# Patient Record
Sex: MALE | Race: WHITE | NOT HISPANIC OR LATINO | Employment: OTHER | URBAN - METROPOLITAN AREA
[De-identification: names, ages, dates, MRNs, and addresses within clinical notes are randomized per-mention and may not be internally consistent; named-entity substitution may affect disease eponyms.]

---

## 2017-01-02 ENCOUNTER — ANTICOAGULATION THERAPY VISIT (OUTPATIENT)
Dept: NURSING | Facility: CLINIC | Age: 77
End: 2017-01-02
Payer: MEDICARE

## 2017-01-02 DIAGNOSIS — I48.20 CHRONIC ATRIAL FIBRILLATION (H): ICD-10-CM

## 2017-01-02 DIAGNOSIS — Z79.01 LONG-TERM (CURRENT) USE OF ANTICOAGULANTS: Primary | ICD-10-CM

## 2017-01-02 LAB — INR POINT OF CARE: 3.2 (ref 0.86–1.14)

## 2017-01-02 PROCEDURE — 99207 ZZC NO CHARGE NURSE ONLY: CPT

## 2017-01-02 PROCEDURE — 85610 PROTHROMBIN TIME: CPT | Mod: QW

## 2017-01-02 PROCEDURE — 36416 COLLJ CAPILLARY BLOOD SPEC: CPT

## 2017-01-02 NOTE — PROGRESS NOTES
ANTICOAGULATION FOLLOW-UP CLINIC VISIT    Patient Name:  William Lechuga  Date:  1/2/2017  Contact Type:  Face to Face    SUBJECTIVE:     Patient Findings     Positives No Problem Findings           OBJECTIVE    INR PROTIME   Date Value Ref Range Status   01/02/2017 3.2* 0.86 - 1.14 Final       ASSESSMENT / PLAN  INR assessment THER    Recheck INR In: 12 WEEKS advised to have INR checked sooner, leaving for [place]   INR Location Clinic      Anticoagulation Summary as of 1/2/2017     INR goal 2.0-3.0   Selected INR 3.2! (1/2/2017)   Maintenance plan 7.5 mg (5 mg x 1.5) on Mon; 5 mg (5 mg x 1) all other days   Full instructions 7.5 mg on Mon; 5 mg all other days   Weekly total 37.5 mg   No change documented Fartun Angulo RN   Plan last modified Ariana Arriaga (3/29/2016)   Next INR check 3/23/2017   Target end date     Indications   Long-term (current) use of anticoagulants [Z79.01] [Z79.01]  Chronic atrial fibrillation (H) [I48.2]         Anticoagulation Episode Summary     INR check location     Preferred lab     Send INR reminders to BE PROVIDER    Comments       Anticoagulation Care Providers     Provider Role Specialty Phone number    Narcisa Mcmahon MD [facility] [phone]            See the Encounter Report to view Anticoagulation Flowsheet and Dosing Calendar (Go to Encounters tab in chart review, and find the Anticoagulation Therapy Visit)        Fartun Angulo RN

## 2017-01-02 NOTE — MR AVS SNAPSHOT
William ALEXANDER Alexandrea   1/2/2017 8:30 AM   Anticoagulation Therapy Visit    Description:  76 year old male   Provider:  ASHLEY ANTI COAG   Department:  Be Nurse           INR as of 1/2/2017     Selected INR 3.2! (1/2/2017)      Anticoagulation Summary as of 1/2/2017     INR goal 2.0-3.0   Selected INR 3.2! (1/2/2017)   Full instructions 7.5 mg on Mon; 5 mg all other days   Next INR check 3/23/2017    Indications   Long-term (current) use of anticoagulants [Z79.01] [Z79.01]  Chronic atrial fibrillation (H) [I48.2]         Description     Patient leaving for [place] and will be returning in March.      Your next Anticoagulation Clinic appointment(s)     Mar 23, 2017  8:30 AM   Anticoagulation Visit with BE ANTI COAG   [facility] [place] ([facility])    [address]  [place] [address] [phone]              Contact Numbers     [facility]  Please call [phone] with any problems or questions regarding your therapy, or to cancel or reschedule your appointment.          January 2017 Details    Sun Mon Tue Wed Thu Fri Sat     1               2      7.5 mg   See details      3      5 mg         4      5 mg         5      5 mg         6      5 mg         7      5 mg           8      5 mg         9      7.5 mg         10      5 mg         11      5 mg         12      5 mg         13      5 mg         14      5 mg           15      5 mg         16      7.5 mg         17      5 mg         18      5 mg         19      5 mg         20      5 mg         21      5 mg           22      5 mg         23      7.5 mg         24      5 mg         25      5 mg         26      5 mg         27      5 mg         28      5 mg           29      5 mg         30      7.5 mg         31      5 mg              Date Details   01/02 This INR check               How to take your warfarin dose     To take:  5 mg Take 1 of the 5 mg tablets.    To take:  7.5 mg Take 1.5 of the 5 mg tablets.           February 2017  Details    Sun Mon Tue Wed Thu Fri Sat        1      5 mg         2      5 mg         3      5 mg         4      5 mg           5      5 mg         6      7.5 mg         7      5 mg         8      5 mg         9      5 mg         10      5 mg         11      5 mg           12      5 mg         13      7.5 mg         14      5 mg         15      5 mg         16      5 mg         17      5 mg         18      5 mg           19      5 mg         20      7.5 mg         21      5 mg         22      5 mg         23      5 mg         24      5 mg         25      5 mg           26      5 mg         27      7.5 mg         28      5 mg              Date Details   No additional details            How to take your warfarin dose     To take:  5 mg Take 1 of the 5 mg tablets.    To take:  7.5 mg Take 1.5 of the 5 mg tablets.           March 2017 Details    Sun Mon Tue Wed u Fri Sat        1      5 mg         2      5 mg         3      5 mg         4      5 mg           5      5 mg         6      7.5 mg         7      5 mg         8      5 mg         9      5 mg         10      5 mg         11      5 mg           12      5 mg         13      7.5 mg         14      5 mg         15      5 mg         16      5 mg         17      5 mg         18      5 mg           19      5 mg         20      7.5 mg         21      5 mg         22      5 mg         23            24               25                 26               27               28               29               30               31                 Date Details   No additional details    Date of next INR:  3/23/2017         How to take your warfarin dose     To take:  5 mg Take 1 of the 5 mg tablets.    To take:  7.5 mg Take 1.5 of the 5 mg tablets.

## 2017-01-06 ENCOUNTER — OFFICE VISIT (OUTPATIENT)
Dept: SLEEP MEDICINE | Facility: CLINIC | Age: 77
End: 2017-01-06
Payer: MEDICARE

## 2017-01-06 VITALS
HEIGHT: 72 IN | DIASTOLIC BLOOD PRESSURE: 82 MMHG | OXYGEN SATURATION: 98 % | SYSTOLIC BLOOD PRESSURE: 133 MMHG | HEART RATE: 53 BPM | BODY MASS INDEX: 30.88 KG/M2 | WEIGHT: 228 LBS

## 2017-01-06 DIAGNOSIS — G47.33 OSA (OBSTRUCTIVE SLEEP APNEA): Primary | ICD-10-CM

## 2017-01-06 PROCEDURE — 99213 OFFICE O/P EST LOW 20 MIN: CPT | Performed by: PHYSICIAN ASSISTANT

## 2017-01-06 NOTE — PATIENT INSTRUCTIONS

## 2017-01-06 NOTE — NURSING NOTE
Chief Complaint   Patient presents with     RECHECK     CPAP f/u       Initial /83 mmHg  Pulse 44  Ht 1.829 m (6')  Wt 103.42 kg (228 lb)  BMI 30.92 kg/m2  SpO2 98% Estimated body mass index is 30.92 kg/(m^2) as calculated from the following:    Height as of this encounter: 1.829 m (6').    Weight as of this encounter: 103.42 kg (228 lb).  BP completed using cuff size: regular

## 2017-01-06 NOTE — PROGRESS NOTES
Obstructive Sleep Apnea- PAP Follow-Up Visit:    Chief Complaint   Patient presents with     RECHECK     CPAP f/u       William Lechuga comes in today for follow-up of their moderate sleep apnea, managed with CPAP.     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 He was referred for sleep evaluation in the setting of chronic Afib and suspected sleep disordered breathing.    William had a sleep study done on 10/29/2015 at the [facility] [place] for loud snoring, gasping, witnessed apnea, some fatigue, morning dry mouth, crowded oropharynx and co-morbid HTN and A-fib.  Due to presence of respiratory events, this study was done in two parts (baseline followed by CPAP titration).  Diagnostic PSG:Sleep latency 14.9 minutes without Ambien. REM achieved. REM latency 41.5 minutes. Sleep efficiency 77.9%. Total sleep time 226.5 minutes.  Sleep architecture: Stage 1, 10.4% (5%), stage 2, 57.6% (45-55%), stage 3, 15.0% (15-20%), stage REM, 17.0% (20-25%). AHI was 15.9, lowest oxygen saturation was 80.3% (time spent below 89% was 25.4 minutes). RDI 18.0. Consistent with moderate REM NIELS. Periodic Limb Movement Index 22.8/hour. PLM arousal index 1.3 per hour   CPAP titration: Sleep latency 42.0 minutes. REM latency 24.5 minutes. Sleep efficiency 51.1%. Total sleep time 68.5 minutes. Sleep architecture: Stage 1, 13.9% (5%), stage 2, 65.7% (45-55%), stage 3, 8.8% (15-20%), stage REM, 11.7% (20-25%). The optimal pressure was 5 cmH2O with an AHI of 0 events per hour. Time in REM supine on final pressure was 0 minutes. This titration was considered adequate (residual RDI with 75% decrease, or above constraints without REM-supine sleep at final pressure.  Periodic Limb Movement Index 0.0 /hour.     Last clinic visit was on 1/11/2016 with me. He reports CPAP continues to work well for him. He wants to obtain  supplies, otherwise no other concerns. He reports using the CPAP regularly during sleep and denies air hunger or interface problems. He denies any hospitalizations due to cardiovasular disease and stroke since he was last seen at the sleep clinic.    Overall, the patient rates their experience with PAP as 8 (0 poor, 10 great). The mask is comfortable. The mask is not leaking, 7 nights per week. They are not snoring with the mask on. They are not having gasp arousals.  They are not having significant oral/nasal dryness. The pressure settings are comfortable.     Patient uses full-face mask.    Bedtime is typically 9 pm. Usually it takes about 10 minutes to fall asleep with the mask on. Wake time is typically 3:30 am.  Patient is using PAP therapy 7 hours per night. The patient is usually getting 6.5 hours of sleep per night.    Patient does feel rested in the morning.    Jefferson Sleepiness Scale: 4/24    Respironics  Auto-PAP 9-14 cmH2O download:  30 total days of use. 0 nonuse days.  Average use 6 hours 59 minutes per day.  100% days with >4 hours use.  Large leak 15 min 28 sec per day average. CPAP 90% pressure 12.2cm. AHI 4.3        Past medical/surgical history, family history, social history, medications and allergies were reviewed.      Problem List:  Patient Active Problem List    Diagnosis Date Noted     Long-term (current) use of anticoagulants [Z79.01] 03/29/2016     Priority: Medium     NIELS (obstructive sleep apnea)- moderate (AHI 15) 11/02/2015     Priority: Medium     Study Date: 10/29/2015- (224.0 lbs) apnea/hypopnea index was 15.9 events per hour.  The REM AHI was 20.3 events per hour.  The supine AHI was 5.7 events per hour. Lowest oxygen saturation was 80.3%.  Time spent below 89% was 25.4 minutes. CPAP optimal pressure was 5 cmH2O with an AHI of 0 events per hour.  Time in REM supine on final pressure was 0 minutes. During the diagnostic portion of the study, PLM index was 22.8 movements per hour.  During the treatment portion of the study, there were 0 PLMs recorded.        Malignant neoplasm of thyroid gland (H) 10/31/2015     Priority: Medium     Hurthle cell cancer, s/p thyroidectomy 1/2005        Obesity 10/22/2015     Priority: Medium     Hyperlipidemia LDL goal <130 10/31/2010     Priority: Medium     Chronic atrial fibrillation (H)      Priority: Medium     Hypertension      Priority: Medium     Impaired fasting glucose      Priority: Medium     Hypothyroid      Priority: Medium        /82 mmHg  Pulse 53  Ht 1.829 m (6')  Wt 103.42 kg (228 lb)  BMI 30.92 kg/m2  SpO2 98%        Impression/Plan:  1. Moderate obstructive sleep apnea-  The patient is showing a good degree of compliance, appears well controlled on auto CPAP 9-14 cm/H20 and is receiving a good improvement in sleep quality at this point in time.   Continue current treatment  DME order renewed.   Patient is currently using [employer] as an equipment supplier and verbalized knowledge of how to reach them as needed. Techniques given to patient to assist with proper fitting of mask and keeping seal throughout the night. nformation also given on maintenance/cleaning or equipment and when to replace equipment/supplies. Patient reminded of risks associated with untreated NIELS. Reminded not drive or engage in potentially dangerous activities if feeling sleepy. Reminded to bring PAP equipment if hospitalized and if anticipating surgery to discuss sleep apnea and use of PAP with surgeon.        William Lechuga will follow up in about 1 year(s).     Fifteen minutes spent with patient, all of which were spent face-to-face counseling, consulting, coordinating plan of care.      Jaylene Viera PA-C    CC:  Narcisa Mcmahon

## 2017-01-06 NOTE — MR AVS SNAPSHOT
After Visit Summary   1/6/2017    William Lechuga    MRN: [id number]           Patient Information     Date Of Birth          1940        Visit Information        Provider Department      1/6/2017 7:30 AM Jaylene Viera PA [facility]        Today's Diagnoses     NIELS (obstructive sleep apnea)    -  1       Care Instructions      Your BMI is Body mass index is 30.92 kg/(m^2).  Weight management is a personal decision.  If you are interested in exploring weight loss strategies, the following discussion covers the approaches that may be successful. Body mass index (BMI) is one way to tell whether you are at a healthy weight, overweight, or obese. It measures your weight in relation to your height.  A BMI of 18.5 to 24.9 is in the healthy range. A person with a BMI of 25 to 29.9 is considered overweight, and someone with a BMI of 30 or greater is considered obese. More than two-thirds of American adults are considered overweight or obese.  Being overweight or obese increases the risk for further weight gain. Excess weight may lead to heart disease and diabetes.  Creating and following plans for healthy eating and physical activity may help you improve your health.  Weight control is part of healthy lifestyle and includes exercise, emotional health, and healthy eating habits. Careful eating habits lifelong are the mainstay of weight control. Though there are significant health benefits from weight loss, long-term weight loss with diet alone may be very difficult to achieve- studies show long-term success with dietary management in less than 10% of people. Attaining a healthy weight may be especially difficult to achieve in those with severe obesity. In some cases, medications, devices and surgical management might be considered.  What can you do?  If you are overweight or obese and are interested in methods for weight loss, you should discuss this with your provider.     Consider reducing  daily calorie intake by 500 calories.     Keep a food journal.     Avoiding skipping meals, consider cutting portions instead.    Diet combined with exercise helps maintain muscle while optimizing fat loss. Strength training is particularly important for building and maintaining muscle mass. Exercise helps reduce stress, increase energy, and improves fitness. Increasing exercise without diet control, however, may not burn enough calories to loose weight.       Start walking three days a week 10-20 minutes at a time    Work towards walking thirty minutes five days a week     Eventually, increase the speed of your walking for 1-2 minutes at time    In addition, we recommend that you review healthy lifestyles and methods for weight loss available through the National Institutes of Health patient information sites:  http://win.niddk.nih.gov/publications/index.htm    And look into health and wellness programs that may be available through your health insurance provider, employer, local community center, or [employer] club.    Weight management plan: Patient was referred to their PCP to discuss a diet and exercise plan.            Follow-ups after your visit        Follow-up notes from your care team     Return in about 1 year (around 1/6/2018).      Your next 10 appointments already scheduled     Mar 23, 2017  8:30 AM   Anticoagulation Visit with BE ANTI COAG   [facility] [place] ([facility])    [address] [address] [phone]              Who to contact     If you have questions or need follow up information about today's clinic visit or your schedule please contact [facility] directly at [phone].  Normal or non-critical lab and imaging results will be communicated to you by MyChart, letter or phone within 4 business days after the clinic has received the results. If you do not hear from us within 7 days, please contact the clinic through [facility] or  "phone. If you have a critical or abnormal lab result, we will notify you by phone as soon as possible.  Submit refill requests through [employer] or call your pharmacy and they will forward the refill request to us. Please allow 3 business days for your refill to be completed.          Additional Information About Your Visit        [facility] Information     [employer] lets you send messages to your doctor, view your test results, renew your prescriptions, schedule appointments and more. To sign up, go to [email or web address] . Click on \"Log in\" on the left side of the screen, which will take you to the Welcome page. Then click on \"Sign up Now\" on the right side of the page.     You will be asked to enter the access code listed below, as well as some personal information. Please follow the directions to create your username and password.     Your access code is: [email or web address]  Expires: 2017  9:59 AM     Your access code will  in 90 days. If you need help or a new code, please call your [facility] clinic or [phone].        Care EveryWhere ID     This is your Care EveryWhere ID. This could be used by other organizations to access your [facility] medical records  [id number]        Your Vitals Were     Pulse Height BMI (Body Mass Index) Pulse Oximetry          53 1.829 m (6') 30.92 kg/m2 98%         Blood Pressure from Last 3 Encounters:   17 133/82   16 130/84   16 124/76    Weight from Last 3 Encounters:   17 103.42 kg (228 lb)   16 101.606 kg (224 lb)   16 105.235 kg (232 lb)              Today, you had the following     No orders found for display       Primary Care Provider Office Phone # Fax #    Narcisa Mcmahon [phone]       [address] [address]        Thank you!     Thank you for choosing [facility]  for your care. Our goal is always to provide you with excellent care. Hearing " back from our patients is one way we can continue to improve our services. Please take a few minutes to complete the written survey that you may receive in the mail after your visit with us. Thank you!             Your Updated Medication List - Protect others around you: Learn how to safely use, store and throw away your medicines at www.disposemymeds.org.          This list is accurate as of: 1/6/17  8:01 AM.  Always use your most recent med list.                   Brand Name Dispense Instructions for use    hydrochlorothiazide 25 MG tablet    HYDRODIURIL     1 TABLET EVERY MORNING       levothyroxine 137 MCG tablet    SYNTHROID/LEVOTHROID     137 mcg daily       lisinopril 10 MG tablet    PRINIVIL/ZESTRIL     1 TABLET DAILY       metoprolol 100 MG 24 hr tablet    TOPROL-XL     100 mg daily       simvastatin 40 MG tablet    ZOCOR     40 mg daily       VITAMIN D3 PO      Take 500 Units by mouth daily       warfarin 5 MG tablet    COUMADIN     Take 1.5 tablets Mondays and Thursdays and 1 tablet all other days of the week or as directed

## 2017-03-22 ENCOUNTER — ANTICOAGULATION THERAPY VISIT (OUTPATIENT)
Dept: NURSING | Facility: CLINIC | Age: 77
End: 2017-03-22
Payer: MEDICARE

## 2017-03-22 DIAGNOSIS — I48.20 CHRONIC ATRIAL FIBRILLATION (H): ICD-10-CM

## 2017-03-22 DIAGNOSIS — Z79.01 LONG-TERM (CURRENT) USE OF ANTICOAGULANTS: ICD-10-CM

## 2017-03-22 LAB — INR POINT OF CARE: 2.4 (ref 0.86–1.14)

## 2017-03-22 PROCEDURE — 85610 PROTHROMBIN TIME: CPT | Mod: QW

## 2017-03-22 PROCEDURE — 99207 ZZC NO CHARGE NURSE ONLY: CPT

## 2017-03-22 PROCEDURE — 36416 COLLJ CAPILLARY BLOOD SPEC: CPT

## 2017-03-22 NOTE — MR AVS SNAPSHOT
William ALEXANDER Alexandrea   3/22/2017 10:30 AM   Anticoagulation Therapy Visit    Description:  76 year old male   Provider:  ASHLEY ANTI COAG   Department:  Be Nurse           INR as of 3/22/2017     Today's INR 2.4      Anticoagulation Summary as of 3/22/2017     INR goal 2.0-3.0   Today's INR 2.4   Full instructions 7.5 mg on Mon; 5 mg all other days   Next INR check 5/30/2017    Indications   Long-term (current) use of anticoagulants [Z79.01] [Z79.01]  Chronic atrial fibrillation (H) [I48.2]         Description     Patient will be having INR done at the [facility] on 4-26-17.  Patient will make sure we get the results.      Your next Anticoagulation Clinic appointment(s)     May 30, 2017  8:30 AM CDT   Anticoagulation Visit with BE ANTI COAG   [facility] ([facility]    [address]  [place] [address] [phone]              Contact Numbers     [facility]  Please call [phone] with any problems or questions regarding your therapy, or to cancel or reschedule your appointment.          March 2017 Details    Sun Mon Tue Wed Thu Fri Sat        1               2               3               4                 5               6               7               8               9               10               11                 12               13               14               15               16               17               18                 19               20               21               22      5 mg   See details      23      5 mg         24      5 mg         25      5 mg           26      5 mg         27      7.5 mg         28      5 mg         29      5 mg         30      5 mg         31      5 mg           Date Details   03/22 This INR check               How to take your warfarin dose     To take:  5 mg Take 1 of the 5 mg tablets.    To take:  7.5 mg Take 1.5 of the 5 mg tablets.           April 2017 Details    Sun Mon Tue Wed Thu Fri Sat           1      5 mg           2       5 mg         3      7.5 mg         4      5 mg         5      5 mg         6      5 mg         7      5 mg         8      5 mg           9      5 mg         10      7.5 mg         11      5 mg         12      5 mg         13      5 mg         14      5 mg         15      5 mg           16      5 mg         17      7.5 mg         18      5 mg         19      5 mg         20      5 mg         21      5 mg         22      5 mg           23      5 mg         24      7.5 mg         25      5 mg         26      5 mg         27      5 mg         28      5 mg         29      5 mg           30      5 mg                Date Details   No additional details            How to take your warfarin dose     To take:  5 mg Take 1 of the 5 mg tablets.    To take:  7.5 mg Take 1.5 of the 5 mg tablets.           May 2017 Details    Sun Mon Tue Wed Thu Fri Sat      1      7.5 mg         2      5 mg         3      5 mg         4      5 mg         5      5 mg         6      5 mg           7      5 mg         8      7.5 mg         9      5 mg         10      5 mg         11      5 mg         12      5 mg         13      5 mg           14      5 mg         15      7.5 mg         16      5 mg         17      5 mg         18      5 mg         19      5 mg         20      5 mg           21      5 mg         22      7.5 mg         23      5 mg         24      5 mg         25      5 mg         26      5 mg         27      5 mg           28      5 mg         29      7.5 mg         30            31                   Date Details   No additional details    Date of next INR:  5/30/2017         How to take your warfarin dose     To take:  5 mg Take 1 of the 5 mg tablets.    To take:  7.5 mg Take 1.5 of the 5 mg tablets.

## 2017-03-22 NOTE — PROGRESS NOTES
ANTICOAGULATION FOLLOW-UP CLINIC VISIT    Patient Name:  William Lechuga  Date:  3/22/2017  Contact Type:  Face to Face    SUBJECTIVE:     Patient Findings     Positives No Problem Findings           OBJECTIVE    INR Protime   Date Value Ref Range Status   03/22/2017 2.4 (A) 0.86 - 1.14 Final       ASSESSMENT / PLAN  INR assessment THER    Recheck INR In: 6 WEEKS having done at [facility]      Anticoagulation Summary as of 3/22/2017     INR goal 2.0-3.0   Today's INR 2.4   Maintenance plan 7.5 mg (5 mg x 1.5) on Mon; 5 mg (5 mg x 1) all other days   Full instructions 7.5 mg on Mon; 5 mg all other days   Weekly total 37.5 mg   No change documented Fartun Angulo RN   Plan last modified Ariana Arriaga (3/29/2016)   Next INR check 5/30/2017   Target end date     Indications   Long-term (current) use of anticoagulants [Z79.01] [Z79.01]  Chronic atrial fibrillation (H) [I48.2]         Anticoagulation Episode Summary     INR check location     Preferred lab     Send INR reminders to BE PROVIDER    Comments       Anticoagulation Care Providers     Provider Role Specialty Phone number    Narcisa Mcmahon MD [facility] [phone]            See the Encounter Report to view Anticoagulation Flowsheet and Dosing Calendar (Go to Encounters tab in chart review, and find the Anticoagulation Therapy Visit)        Fartun Angulo RN

## 2017-05-30 ENCOUNTER — ANTICOAGULATION THERAPY VISIT (OUTPATIENT)
Dept: NURSING | Facility: CLINIC | Age: 77
End: 2017-05-30
Payer: MEDICARE

## 2017-05-30 DIAGNOSIS — Z79.01 LONG-TERM (CURRENT) USE OF ANTICOAGULANTS: ICD-10-CM

## 2017-05-30 DIAGNOSIS — I48.20 CHRONIC ATRIAL FIBRILLATION (H): ICD-10-CM

## 2017-05-30 LAB — INR POINT OF CARE: 3 (ref 0.86–1.14)

## 2017-05-30 PROCEDURE — 85610 PROTHROMBIN TIME: CPT | Mod: QW

## 2017-05-30 PROCEDURE — 99207 ZZC NO CHARGE NURSE ONLY: CPT

## 2017-05-30 PROCEDURE — 36416 COLLJ CAPILLARY BLOOD SPEC: CPT

## 2017-05-30 NOTE — MR AVS SNAPSHOT
William CATHERINE Lechuga   5/30/2017 8:30 AM   Anticoagulation Therapy Visit    Description:  76 year old male   Provider:  ASHLEY ANTI COAG   Department:  Be Nurse           INR as of 5/30/2017     Today's INR 3.0      Anticoagulation Summary as of 5/30/2017     INR goal 2.0-3.0   Today's INR 3.0   Full instructions 7.5 mg on Mon; 5 mg all other days   Next INR check 7/17/2017    Indications   Long-term (current) use of anticoagulants [Z79.01] [Z79.01]  Chronic atrial fibrillation (H) [I48.2]         Your next Anticoagulation Clinic appointment(s)     Jul 17, 2017  8:30 AM CDT   Anticoagulation Visit with BE ANTI COAG   [facility] [place] ([facility] [place])    [address]  [place] [address] [phone]              Contact Numbers     [facility]  Please call [phone] with any problems or questions regarding your therapy, or to cancel or reschedule your appointment.          May 2017 Details    Sun Mon Tue Wed Thu Fri Sat      1               2               3               4               5               6                 7               8               9               10               11               12               13                 14               15               16               17               18               19               20                 21               22               23               24               25               26               27                 28               29               30      5 mg   See details      31      5 mg             Date Details   05/30 This INR check               How to take your warfarin dose     To take:  5 mg Take 1 of the 5 mg tablets.           June 2017 Details    Sun Mon Tue Wed Thu Fri Sat         1      5 mg         2      5 mg         3      5 mg           4      5 mg         5      7.5 mg         6      5 mg         7      5 mg         8      5 mg         9      5 mg         10      5 mg           11      5 mg         12       7.5 mg         13      5 mg         14      5 mg         15      5 mg         16      5 mg         17      5 mg           18      5 mg         19      7.5 mg         20      5 mg         21      5 mg         22      5 mg         23      5 mg         24      5 mg           25      5 mg         26      7.5 mg         27      5 mg         28      5 mg         29      5 mg         30      5 mg           Date Details   No additional details            How to take your warfarin dose     To take:  5 mg Take 1 of the 5 mg tablets.    To take:  7.5 mg Take 1.5 of the 5 mg tablets.           July 2017 Details    Sun Mon Tue Wed Thu Fri Sat           1      5 mg           2      5 mg         3      7.5 mg         4      5 mg         5      5 mg         6      5 mg         7      5 mg         8      5 mg           9      5 mg         10      7.5 mg         11      5 mg         12      5 mg         13      5 mg         14      5 mg         15      5 mg           16      5 mg         17            18               19               20               21               22                 23               24               25               26               27               28               29                 30               31                     Date Details   No additional details    Date of next INR:  7/17/2017         How to take your warfarin dose     To take:  5 mg Take 1 of the 5 mg tablets.    To take:  7.5 mg Take 1.5 of the 5 mg tablets.

## 2017-05-30 NOTE — PROGRESS NOTES
ANTICOAGULATION FOLLOW-UP CLINIC VISIT    Patient Name:  William Lechuga  Date:  5/30/2017  Contact Type:  Face to Face    SUBJECTIVE:     Patient Findings     Positives No Problem Findings           OBJECTIVE    INR Protime   Date Value Ref Range Status   05/30/2017 3.0 (A) 0.86 - 1.14 Final       ASSESSMENT / PLAN  INR assessment THER    Recheck INR In: 6 WEEKS    INR Location Clinic      Anticoagulation Summary as of 5/30/2017     INR goal 2.0-3.0   Today's INR 3.0   Maintenance plan 7.5 mg (5 mg x 1.5) on Mon; 5 mg (5 mg x 1) all other days   Full instructions 7.5 mg on Mon; 5 mg all other days   Weekly total 37.5 mg   No change documented Ariana Arriaga   Plan last modified Ariana Arriaga (3/29/2016)   Next INR check 7/17/2017   Target end date     Indications   Long-term (current) use of anticoagulants [Z79.01] [Z79.01]  Chronic atrial fibrillation (H) [I48.2]         Anticoagulation Episode Summary     INR check location     Preferred lab     Send INR reminders to BE PROVIDER    Comments       Anticoagulation Care Providers     Provider Role Specialty Phone number    Narcisa Mcmahon MD [facility] [phone]            See the Encounter Report to view Anticoagulation Flowsheet and Dosing Calendar (Go to Encounters tab in chart review, and find the Anticoagulation Therapy Visit)        Ariana Arriaga

## 2017-07-17 ENCOUNTER — ANTICOAGULATION THERAPY VISIT (OUTPATIENT)
Dept: NURSING | Facility: CLINIC | Age: 77
End: 2017-07-17
Payer: MEDICARE

## 2017-07-17 DIAGNOSIS — Z79.01 LONG-TERM (CURRENT) USE OF ANTICOAGULANTS: ICD-10-CM

## 2017-07-17 DIAGNOSIS — I48.20 CHRONIC ATRIAL FIBRILLATION (H): ICD-10-CM

## 2017-07-17 LAB — INR POINT OF CARE: 2.3 (ref 0.86–1.14)

## 2017-07-17 PROCEDURE — 85610 PROTHROMBIN TIME: CPT | Mod: QW

## 2017-07-17 PROCEDURE — 99207 ZZC NO CHARGE NURSE ONLY: CPT

## 2017-07-17 PROCEDURE — 36416 COLLJ CAPILLARY BLOOD SPEC: CPT

## 2017-07-17 NOTE — PROGRESS NOTES
ANTICOAGULATION FOLLOW-UP CLINIC VISIT    Patient Name:  William Lechuga  Date:  7/17/2017  Contact Type:  Face to Face    SUBJECTIVE:     Patient Findings     Positives No Problem Findings           OBJECTIVE    INR Protime   Date Value Ref Range Status   07/17/2017 2.3 (A) 0.86 - 1.14 Final       ASSESSMENT / PLAN  INR assessment THER    Recheck INR In: 6 WEEKS    INR Location Clinic      Anticoagulation Summary as of 7/17/2017     INR goal 2.0-3.0   Today's INR 2.3   Maintenance plan 7.5 mg (5 mg x 1.5) on Mon; 5 mg (5 mg x 1) all other days   Full instructions 7.5 mg on Mon; 5 mg all other days   Weekly total 37.5 mg   No change documented Arminda Liu   Plan last modified Ariana Arriaga (3/29/2016)   Next INR check 8/28/2017   Target end date     Indications   Long-term (current) use of anticoagulants [Z79.01] [Z79.01]  Chronic atrial fibrillation (H) [I48.2]         Anticoagulation Episode Summary     INR check location     Preferred lab     Send INR reminders to BE ANTICOAG CLINIC    Comments       Anticoagulation Care Providers     Provider Role Specialty Phone number    Narcisa Mcmahon MD [facility] [phone]            See the Encounter Report to view Anticoagulation Flowsheet and Dosing Calendar (Go to Encounters tab in chart review, and find the Anticoagulation Therapy Visit)        ARMINDA LIU

## 2017-07-17 NOTE — MR AVS SNAPSHOT
William ALEXANDER Alexandrea   7/17/2017 8:30 AM   Anticoagulation Therapy Visit    Description:  76 year old male   Provider:  ASHLEY ANTI COAG   Department:  Be Nurse           INR as of 7/17/2017     Today's INR 2.3      Anticoagulation Summary as of 7/17/2017     INR goal 2.0-3.0   Today's INR 2.3   Full instructions 7.5 mg on Mon; 5 mg all other days   Next INR check 8/28/2017    Indications   Long-term (current) use of anticoagulants [Z79.01] [Z79.01]  Chronic atrial fibrillation (H) [I48.2]         Your next Anticoagulation Clinic appointment(s)     Jul 17, 2017  8:30 AM CDT   Anticoagulation Visit with BE ANTI COAG   [facility] [place] ([facility])    [address]  [place] [address] [phone]            Aug 28, 2017  8:30 AM CDT   Anticoagulation Visit with BE ANTI COAG   [facility] [place] ([facility])    [address]  [place] [address] [phone]              Contact Numbers     [facility]  Please call [phone] with any problems or questions regarding your therapy, or to cancel or reschedule your appointment.          July 2017 Details    Sun Mon Tue Wed Thu Fri Sat           1                 2               3               4               5               6               7               8                 9               10               11               12               13               14               15                 16               17      7.5 mg   See details      18      5 mg         19      5 mg         20      5 mg         21      5 mg         22      5 mg           23      5 mg         24      7.5 mg         25      5 mg         26      5 mg         27      5 mg         28      5 mg         29      5 mg           30      5 mg         31      7.5 mg               Date Details   07/17 This INR check               How to take your warfarin dose     To take:  5 mg Take 1 of the 5 mg tablets.    To take:  7.5 mg Take 1.5 of the  5 mg tablets.           August 2017 Details    Sun Mon Tue Wed Thu Fri Sat       1      5 mg         2      5 mg         3      5 mg         4      5 mg         5      5 mg           6      5 mg         7      7.5 mg         8      5 mg         9      5 mg         10      5 mg         11      5 mg         12      5 mg           13      5 mg         14      7.5 mg         15      5 mg         16      5 mg         17      5 mg         18      5 mg         19      5 mg           20      5 mg         21      7.5 mg         22      5 mg         23      5 mg         24      5 mg         25      5 mg         26      5 mg           27      5 mg         28            29               30               31                  Date Details   No additional details    Date of next INR:  8/28/2017         How to take your warfarin dose     To take:  5 mg Take 1 of the 5 mg tablets.    To take:  7.5 mg Take 1.5 of the 5 mg tablets.

## 2017-08-28 ENCOUNTER — ANTICOAGULATION THERAPY VISIT (OUTPATIENT)
Dept: NURSING | Facility: CLINIC | Age: 77
End: 2017-08-28
Payer: MEDICARE

## 2017-08-28 DIAGNOSIS — I48.20 CHRONIC ATRIAL FIBRILLATION (H): ICD-10-CM

## 2017-08-28 DIAGNOSIS — Z79.01 LONG-TERM (CURRENT) USE OF ANTICOAGULANTS: ICD-10-CM

## 2017-08-28 LAB — INR POINT OF CARE: 2.6 (ref 0.86–1.14)

## 2017-08-28 PROCEDURE — 85610 PROTHROMBIN TIME: CPT | Mod: QW

## 2017-08-28 PROCEDURE — 99207 ZZC NO CHARGE NURSE ONLY: CPT

## 2017-08-28 PROCEDURE — 36416 COLLJ CAPILLARY BLOOD SPEC: CPT

## 2017-08-28 NOTE — PROGRESS NOTES
ANTICOAGULATION FOLLOW-UP CLINIC VISIT    Patient Name:  William Lechuga  Date:  8/28/2017  Contact Type:  Face to Face    SUBJECTIVE:     Patient Findings     Positives No Problem Findings           OBJECTIVE    INR Protime   Date Value Ref Range Status   08/28/2017 2.6 (A) 0.86 - 1.14 Final       ASSESSMENT / PLAN  INR assessment THER    Recheck INR In: 6 WEEKS    INR Location Clinic      Anticoagulation Summary as of 8/28/2017     INR goal 2.0-3.0   Today's INR 2.6   Maintenance plan 7.5 mg (5 mg x 1.5) on Mon; 5 mg (5 mg x 1) all other days   Full instructions 7.5 mg on Mon; 5 mg all other days   Weekly total 37.5 mg   No change documented Fartun Angulo RN   Plan last modified Ariana Arriaga (3/29/2016)   Next INR check 10/9/2017   Target end date     Indications   Long-term (current) use of anticoagulants [Z79.01] [Z79.01]  Chronic atrial fibrillation (H) [I48.2]         Anticoagulation Episode Summary     INR check location     Preferred lab     Send INR reminders to BE ANTICOAG CLINIC    Comments       Anticoagulation Care Providers     Provider Role Specialty Phone number    Narcisa Mcmahon MD [facility] [phone]            See the Encounter Report to view Anticoagulation Flowsheet and Dosing Calendar (Go to Encounters tab in chart review, and find the Anticoagulation Therapy Visit)        Fartun Angulo RN

## 2017-08-28 NOTE — MR AVS SNAPSHOT
William ALEXANDER Alexandrea   8/28/2017 8:30 AM   Anticoagulation Therapy Visit    Description:  76 year old male   Provider:  ASHLEY ANTI COAG   Department:  Be Nurse           INR as of 8/28/2017     Today's INR 2.6      Anticoagulation Summary as of 8/28/2017     INR goal 2.0-3.0   Today's INR 2.6   Full instructions 7.5 mg on Mon; 5 mg all other days   Next INR check 10/9/2017    Indications   Long-term (current) use of anticoagulants [Z79.01] [Z79.01]  Chronic atrial fibrillation (H) [I48.2]         Your next Anticoagulation Clinic appointment(s)     Oct 09, 2017  8:30 AM CDT   Anticoagulation Visit with ASHLEY ANTI INDIRA   [facility] [place] ([facility] [place])    [address]  [place] [address] [phone]              Contact Numbers     [facility]  Please call [phone] with any problems or questions regarding your therapy, or to cancel or reschedule your appointment.          August 2017 Details    Sun Mon Tue Wed Thu Fri Sat       1               2               3               4               5                 6               7               8               9               10               11               12                 13               14               15               16               17               18               19                 20               21               22               23               24               25               26                 27               28      7.5 mg   See details      29      5 mg         30      5 mg         31      5 mg            Date Details   08/28 This INR check               How to take your warfarin dose     To take:  5 mg Take 1 of the 5 mg tablets.    To take:  7.5 mg Take 1.5 of the 5 mg tablets.           September 2017 Details    Sun Mon Tue Wed Thu Fri Sat          1      5 mg         2      5 mg           3      5 mg         4      7.5 mg         5      5 mg         6      5 mg         7      5 mg         8      5 mg         9       5 mg           10      5 mg         11      7.5 mg         12      5 mg         13      5 mg         14      5 mg         15      5 mg         16      5 mg           17      5 mg         18      7.5 mg         19      5 mg         20      5 mg         21      5 mg         22      5 mg         23      5 mg           24      5 mg         25      7.5 mg         26      5 mg         27      5 mg         28      5 mg         29      5 mg         30      5 mg          Date Details   No additional details            How to take your warfarin dose     To take:  5 mg Take 1 of the 5 mg tablets.    To take:  7.5 mg Take 1.5 of the 5 mg tablets.           October 2017 Details    Sun Mon Tue Wed Thu Fri Sat     1      5 mg         2      7.5 mg         3      5 mg         4      5 mg         5      5 mg         6      5 mg         7      5 mg           8      5 mg         9            10               11               12               13               14                 15               16               17               18               19               20               21                 22               23               24               25               26               27               28                 29               30               31                    Date Details   No additional details    Date of next INR:  10/9/2017         How to take your warfarin dose     To take:  5 mg Take 1 of the 5 mg tablets.    To take:  7.5 mg Take 1.5 of the 5 mg tablets.

## 2017-09-06 DIAGNOSIS — G47.33 OSA (OBSTRUCTIVE SLEEP APNEA): Primary | Chronic | ICD-10-CM

## 2017-10-16 ENCOUNTER — ANTICOAGULATION THERAPY VISIT (OUTPATIENT)
Dept: NURSING | Facility: CLINIC | Age: 77
End: 2017-10-16
Payer: MEDICARE

## 2017-10-16 DIAGNOSIS — I48.20 CHRONIC ATRIAL FIBRILLATION (H): ICD-10-CM

## 2017-10-16 DIAGNOSIS — Z79.01 LONG-TERM (CURRENT) USE OF ANTICOAGULANTS: ICD-10-CM

## 2017-10-16 LAB — INR POINT OF CARE: 3 (ref 0.86–1.14)

## 2017-10-16 PROCEDURE — 99207 ZZC NO CHARGE NURSE ONLY: CPT

## 2017-10-16 PROCEDURE — 36416 COLLJ CAPILLARY BLOOD SPEC: CPT

## 2017-10-16 PROCEDURE — 85610 PROTHROMBIN TIME: CPT | Mod: QW

## 2017-10-16 NOTE — PROGRESS NOTES
ANTICOAGULATION FOLLOW-UP CLINIC VISIT    Patient Name:  William Lechuga  Date:  10/16/2017  Contact Type:  Face to Face    SUBJECTIVE:     Patient Findings     Positives No Problem Findings           OBJECTIVE    INR Protime   Date Value Ref Range Status   10/16/2017 3.0 (A) 0.86 - 1.14 Final       ASSESSMENT / PLAN  INR assessment THER    Recheck INR In: 6 WEEKS    INR Location Clinic      Anticoagulation Summary as of 10/16/2017     INR goal 2.0-3.0   Today's INR 3.0   Maintenance plan 7.5 mg (5 mg x 1.5) on Mon; 5 mg (5 mg x 1) all other days   Full instructions 7.5 mg on Mon; 5 mg all other days   Weekly total 37.5 mg   No change documented Arminda Liu   Plan last modified Ariana Arriaga (3/29/2016)   Next INR check 11/27/2017   Target end date     Indications   Long-term (current) use of anticoagulants [Z79.01] [Z79.01]  Chronic atrial fibrillation (H) [I48.2]         Anticoagulation Episode Summary     INR check location     Preferred lab     Send INR reminders to BE ANTICOAG CLINIC    Comments       Anticoagulation Care Providers     Provider Role Specialty Phone number    Narcisa Mcmahon MD [facility] [phone]            See the Encounter Report to view Anticoagulation Flowsheet and Dosing Calendar (Go to Encounters tab in chart review, and find the Anticoagulation Therapy Visit)        ARMINDA LIU

## 2017-10-16 NOTE — MR AVS SNAPSHOT
William ALEXANDER Alexandrea   10/16/2017 10:45 AM   Anticoagulation Therapy Visit    Description:  77 year old male   Provider:  ASHLEY ANTI COAG   Department:  Be Nurse           INR as of 10/16/2017     Today's INR 3.0      Anticoagulation Summary as of 10/16/2017     INR goal 2.0-3.0   Today's INR 3.0   Full instructions 7.5 mg on Mon; 5 mg all other days   Next INR check 11/27/2017    Indications   Long-term (current) use of anticoagulants [Z79.01] [Z79.01]  Chronic atrial fibrillation (H) [I48.2]         Your next Anticoagulation Clinic appointment(s)     Nov 27, 2017  8:30 AM CST   Anticoagulation Visit with BE ANTI COAG   [facility] [place] [facility] [place]    [address]  [place] [address] [phone]              Contact Numbers     [facility]  Please call [phone] with any problems or questions regarding your therapy, or to cancel or reschedule your appointment.          October 2017 Details    Sun Mon Tue Wed Thu Fri Sat     1               2               3               4               5               6               7                 8               9               10               11               12               13               14                 15               16      7.5 mg   See details      17      5 mg         18      5 mg         19      5 mg         20      5 mg         21      5 mg           22      5 mg         23      7.5 mg         24      5 mg         25      5 mg         26      5 mg         27      5 mg         28      5 mg           29      5 mg         30      7.5 mg         31      5 mg              Date Details   10/16 This INR check               How to take your warfarin dose     To take:  5 mg Take 1 of the 5 mg tablets.    To take:  7.5 mg Take 1.5 of the 5 mg tablets.           November 2017 Details    Sun Mon Tue Wed Thu Fri Sat        1      5 mg         2      5 mg         3      5 mg         4      5 mg           5      5 mg         6       7.5 mg         7      5 mg         8      5 mg         9      5 mg         10      5 mg         11      5 mg           12      5 mg         13      7.5 mg         14      5 mg         15      5 mg         16      5 mg         17      5 mg         18      5 mg           19      5 mg         20      7.5 mg         21      5 mg         22      5 mg         23      5 mg         24      5 mg         25      5 mg           26      5 mg         27            28               29               30                  Date Details   No additional details    Date of next INR:  11/27/2017         How to take your warfarin dose     To take:  5 mg Take 1 of the 5 mg tablets.    To take:  7.5 mg Take 1.5 of the 5 mg tablets.

## 2017-11-27 ENCOUNTER — ANTICOAGULATION THERAPY VISIT (OUTPATIENT)
Dept: NURSING | Facility: CLINIC | Age: 77
End: 2017-11-27
Payer: MEDICARE

## 2017-11-27 DIAGNOSIS — I48.20 CHRONIC ATRIAL FIBRILLATION (H): ICD-10-CM

## 2017-11-27 DIAGNOSIS — Z79.01 LONG-TERM (CURRENT) USE OF ANTICOAGULANTS: ICD-10-CM

## 2017-11-27 LAB — INR POINT OF CARE: 2.4 (ref 0.86–1.14)

## 2017-11-27 PROCEDURE — 85610 PROTHROMBIN TIME: CPT | Mod: QW

## 2017-11-27 PROCEDURE — 36416 COLLJ CAPILLARY BLOOD SPEC: CPT

## 2017-11-27 PROCEDURE — 99207 ZZC NO CHARGE NURSE ONLY: CPT

## 2017-11-27 NOTE — PROGRESS NOTES
ANTICOAGULATION FOLLOW-UP CLINIC VISIT    Patient Name:  William Lechuga  Date:  11/27/2017  Contact Type:  Face to Face    SUBJECTIVE:     Patient Findings     Positives No Problem Findings           OBJECTIVE    INR Protime   Date Value Ref Range Status   11/27/2017 2.4 (A) 0.86 - 1.14 Final       ASSESSMENT / PLAN  INR assessment THER    Recheck INR In: 6 WEEKS    INR Location Clinic      Anticoagulation Summary as of 11/27/2017     INR goal 2.0-3.0   Today's INR 2.4   Maintenance plan 7.5 mg (5 mg x 1.5) on Mon; 5 mg (5 mg x 1) all other days   Full instructions 7.5 mg on Mon; 5 mg all other days   Weekly total 37.5 mg   No change documented Arminda Liu   Plan last modified Ariana Arriaga (3/29/2016)   Next INR check 1/8/2018   Target end date     Indications   Long-term (current) use of anticoagulants [Z79.01] [Z79.01]  Chronic atrial fibrillation (H) [I48.2]         Anticoagulation Episode Summary     INR check location     Preferred lab     Send INR reminders to BE ANTICOAG CLINIC    Comments       Anticoagulation Care Providers     Provider Role Specialty Phone number    Narcisa Mcmahon MD [facility] [phone]            See the Encounter Report to view Anticoagulation Flowsheet and Dosing Calendar (Go to Encounters tab in chart review, and find the Anticoagulation Therapy Visit)        ARMINDA LIU

## 2017-11-27 NOTE — MR AVS SNAPSHOT
William LAEXANDER Alexandrea   11/27/2017 8:30 AM   Anticoagulation Therapy Visit    Description:  77 year old male   Provider:  ASHLEY ANTI COAG   Department:  Be Nurse           INR as of 11/27/2017     Today's INR 2.4      Anticoagulation Summary as of 11/27/2017     INR goal 2.0-3.0   Today's INR 2.4   Full instructions 7.5 mg on Mon; 5 mg all other days   Next INR check 1/8/2018    Indications   Long-term (current) use of anticoagulants [Z79.01] [Z79.01]  Chronic atrial fibrillation (H) [I48.2]         Your next Anticoagulation Clinic appointment(s)     Jan 08, 2018  8:30 AM CST   Anticoagulation Visit with ASHLEY ANTI INDIRA   [facility] [place] ([facility])    [address]  [place] [address] [phone]              Contact Numbers     [facility]  Please call [phone] with any problems or questions regarding your therapy, or to cancel or reschedule your appointment.          November 2017 Details    Sun Mon Tue Wed Thu Fri Sat        1               2               3               4                 5               6               7               8               9               10               11                 12               13               14               15               16               17               18                 19               20               21               22               23               24               25                 26               27      7.5 mg   See details      28      5 mg         29      5 mg         30      5 mg            Date Details   11/27 This INR check               How to take your warfarin dose     To take:  5 mg Take 1 of the 5 mg tablets.    To take:  7.5 mg Take 1.5 of the 5 mg tablets.           December 2017 Details    Sun Mon Tue Wed Thu Fri Sat          1      5 mg         2      5 mg           3      5 mg         4      7.5 mg         5      5 mg         6      5 mg         7      5 mg         8      5 mg         9      5 mg            10      5 mg         11      7.5 mg         12      5 mg         13      5 mg         14      5 mg         15      5 mg         16      5 mg           17      5 mg         18      7.5 mg         19      5 mg         20      5 mg         21      5 mg         22      5 mg         23      5 mg           24      5 mg         25      7.5 mg         26      5 mg         27      5 mg         28      5 mg         29      5 mg         30      5 mg           31      5 mg                Date Details   No additional details            How to take your warfarin dose     To take:  5 mg Take 1 of the 5 mg tablets.    To take:  7.5 mg Take 1.5 of the 5 mg tablets.           January 2018 Details    Sun Mon Tue Wed Thu Fri Sat      1      7.5 mg         2      5 mg         3      5 mg         4      5 mg         5      5 mg         6      5 mg           7      5 mg         8            9               10               11               12               13                 14               15               16               17               18               19               20                 21               22               23               24               25               26               27                 28               29               30               31                   Date Details   No additional details    Date of next INR:  1/8/2018         How to take your warfarin dose     To take:  5 mg Take 1 of the 5 mg tablets.    To take:  7.5 mg Take 1.5 of the 5 mg tablets.

## 2018-01-08 ENCOUNTER — ANTICOAGULATION THERAPY VISIT (OUTPATIENT)
Dept: NURSING | Facility: CLINIC | Age: 78
End: 2018-01-08
Payer: MEDICARE

## 2018-01-08 ENCOUNTER — TELEPHONE (OUTPATIENT)
Dept: NURSING | Facility: CLINIC | Age: 78
End: 2018-01-08

## 2018-01-08 ENCOUNTER — OFFICE VISIT (OUTPATIENT)
Dept: SLEEP MEDICINE | Facility: CLINIC | Age: 78
End: 2018-01-08
Payer: MEDICARE

## 2018-01-08 VITALS
HEIGHT: 72 IN | BODY MASS INDEX: 32.23 KG/M2 | OXYGEN SATURATION: 96 % | HEART RATE: 72 BPM | WEIGHT: 238 LBS | SYSTOLIC BLOOD PRESSURE: 150 MMHG | DIASTOLIC BLOOD PRESSURE: 76 MMHG

## 2018-01-08 DIAGNOSIS — G47.33 OSA (OBSTRUCTIVE SLEEP APNEA): Primary | ICD-10-CM

## 2018-01-08 DIAGNOSIS — I48.20 CHRONIC ATRIAL FIBRILLATION (H): ICD-10-CM

## 2018-01-08 DIAGNOSIS — Z79.01 LONG-TERM (CURRENT) USE OF ANTICOAGULANTS: ICD-10-CM

## 2018-01-08 DIAGNOSIS — I48.20 CHRONIC ATRIAL FIBRILLATION (H): Primary | Chronic | ICD-10-CM

## 2018-01-08 LAB — INR POINT OF CARE: 2.7 (ref 0.86–1.14)

## 2018-01-08 PROCEDURE — 36416 COLLJ CAPILLARY BLOOD SPEC: CPT

## 2018-01-08 PROCEDURE — 99213 OFFICE O/P EST LOW 20 MIN: CPT | Performed by: PHYSICIAN ASSISTANT

## 2018-01-08 PROCEDURE — 99207 ZZC NO CHARGE NURSE ONLY: CPT

## 2018-01-08 PROCEDURE — 85610 PROTHROMBIN TIME: CPT | Mod: QW

## 2018-01-08 NOTE — PROGRESS NOTES
Obstructive Sleep Apnea- PAP Follow-Up Visit:    Chief Complaint   Patient presents with     CPAP Follow Up       Wliliam Lechuga comes in today for follow-up of their moderate sleep apnea, managed with CPAP.     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 He was referred for sleep evaluation in the setting of chronic Afib and suspected sleep disordered breathing.    William had a sleep study done on 10/29/2015 at the [facility] for loud snoring, gasping, witnessed apnea, some fatigue, morning dry mouth, crowded oropharynx and co-morbid HTN and A-fib.  Due to presence of respiratory events, this study was done in two parts (baseline followed by CPAP titration).  Diagnostic PSG:Sleep latency 14.9 minutes without Ambien. REM achieved. REM latency 41.5 minutes. Sleep efficiency 77.9%. Total sleep time 226.5 minutes.  Sleep architecture: Stage 1, 10.4% (5%), stage 2, 57.6% (45-55%), stage 3, 15.0% (15-20%), stage REM, 17.0% (20-25%). AHI was 15.9, lowest oxygen saturation was 80.3% (time spent below 89% was 25.4 minutes). RDI 18.0. Consistent with moderate REM NIELS. Periodic Limb Movement Index 22.8/hour. PLM arousal index 1.3 per hour   CPAP titration: Sleep latency 42.0 minutes. REM latency 24.5 minutes. Sleep efficiency 51.1%. Total sleep time 68.5 minutes. Sleep architecture: Stage 1, 13.9% (5%), stage 2, 65.7% (45-55%), stage 3, 8.8% (15-20%), stage REM, 11.7% (20-25%). The optimal pressure was 5 cmH2O with an AHI of 0 events per hour. Time in REM supine on final pressure was 0 minutes. This titration was considered adequate (residual RDI with 75% decrease, or above constraints without REM-supine sleep at final pressure.  Periodic Limb Movement Index 0.0 /hour.    Overall, he rates the experience with PAP as 9 (0 poor, 10 great). The mask is comfortable.    The mask is not  leaking .  He is not snoring with the mask on. He is not having gasp arousals.  He is having significant oral/nasal dryness. The pressure is comfortable.     His PAP interface is Full Face Mask.    Bedtime is typically 2100. Usually it takes about 10 min minutes to fall asleep with the mask on. Wake time is typically 0430.  Patient is using PAP therapy 7 hours per night. The patient is usually getting 6.5 hours of sleep per night.    He does feel rested in the morning.    Total score - Milnor: 4 (1/8/2018  1:00 PM)    Respironics  Auto-PAP 9 - 14 cmH2O 30 day usage data:    100% of days with > 4 hours of use. 0/30 days with no use. Average use 7 hours 7 minutes per day.     Average time of night in large leak 34 sec..    CPAP 90% pressure 12.5cm.   AHI 3.6 events per hour.    He reports CPAP is going very well. He has no concerns today.     Past medical/surgical history, family history, social history, medications and allergies were reviewed.      Problem List:  Patient Active Problem List    Diagnosis Date Noted     Long-term (current) use of anticoagulants [Z79.01] 03/29/2016     Priority: Medium     NIELS (obstructive sleep apnea)- moderate (AHI 15) 11/02/2015     Priority: Medium     Study Date: 10/29/2015- (224.0 lbs) apnea/hypopnea index was 15.9 events per hour.  The REM AHI was 20.3 events per hour.  The supine AHI was 5.7 events per hour. Lowest oxygen saturation was 80.3%.  Time spent below 89% was 25.4 minutes. CPAP optimal pressure was 5 cmH2O with an AHI of 0 events per hour.  Time in REM supine on final pressure was 0 minutes. During the diagnostic portion of the study, PLM index was 22.8 movements per hour. During the treatment portion of the study, there were 0 PLMs recorded.        Malignant neoplasm of thyroid gland (H) 10/31/2015     Priority: Medium     Hurthle cell cancer, s/p thyroidectomy 1/2005        Obesity 10/22/2015     Priority: Medium     Hyperlipidemia LDL goal <130 10/31/2010      Priority: Medium     Chronic atrial fibrillation (H)      Priority: Medium     Hypertension      Priority: Medium     Impaired fasting glucose      Priority: Medium     Hypothyroid      Priority: Medium        /76  Pulse 72  Ht 1.829 m (6')  Wt 108 kg (238 lb)  SpO2 96%  BMI 32.28 kg/m2    Impression/Plan:    Moderate Sleep apnea-  Excellent PAP compliance and appears well controlled on CPAP 9-14 cm/H20.  Continue current treatment.   Comprehensive DME.     William Lechuga will follow up in about 2 year, sooner if questions/concerns.    Fifteen minutes spent with patient, all of which were spent face-to-face counseling, consulting, coordinating plan of care regarding NIELS.      Jaylene Viera PA-C    CC:  Narcisa Mcmahon

## 2018-01-08 NOTE — MR AVS SNAPSHOT
William ALEXANDER Alexandrea   1/8/2018 8:30 AM   Anticoagulation Therapy Visit    Description:  77 year old male   Provider:  [facility]   Department:  [facility]           INR as of 1/8/2018     Today's INR 2.7      Anticoagulation Summary as of 1/8/2018     INR goal 2.0-3.0   Today's INR 2.7   Full instructions 7.5 mg on Mon; 5 mg all other days   Next INR check 2/19/2018    Indications   Long-term (current) use of anticoagulants [Z79.01] [Z79.01]  Chronic atrial fibrillation (H) [I48.2]         Contact Numbers     [facility] (formerly [facility]3]  Please call [phone] with any problems or questions regarding your therapy, or to cancel or reschedule your appointment.          January 2018 Details    Sun Mon Tue Wed Thu Fri Sat      1               2               3               4               5               6                 7               8      7.5 mg   See details      9      5 mg         10      5 mg         11      5 mg         12      5 mg         13      5 mg           14      5 mg         15      7.5 mg         16      5 mg         17      5 mg         18      5 mg         19      5 mg         20      5 mg           21      5 mg         22      7.5 mg         23      5 mg         24      5 mg         25      5 mg         26      5 mg         27      5 mg           28      5 mg         29      7.5 mg         30      5 mg         31      5 mg             Date Details   01/08 This INR check               How to take your warfarin dose     To take:  5 mg Take 1 of the 5 mg tablets.    To take:  7.5 mg Take 1.5 of the 5 mg tablets.           February 2018 Details    Sun Mon Tue Wed Thu Fri Sat         1      5 mg         2      5 mg         3      5 mg           4      5 mg         5      7.5 mg         6      5 mg         7      5 mg         8      5 mg         9      5 mg         10      5 mg           11      5 mg         12      7.5 mg         13      5 mg         14      5 mg         15      5 mg         16      5 mg          17      5 mg           18      5 mg         19            20               21               22               23               24                 25               26               27               28                   Date Details   No additional details    Date of next INR:  2/19/2018         How to take your warfarin dose     To take:  5 mg Take 1 of the 5 mg tablets.    To take:  7.5 mg Take 1.5 of the 5 mg tablets.

## 2018-01-08 NOTE — NURSING NOTE
Chief Complaint   Patient presents with     CPAP Follow Up       Initial /81  Pulse 72  Ht 1.829 m (6')  Wt 108 kg (238 lb)  SpO2 96%  BMI 32.28 kg/m2 Estimated body mass index is 32.28 kg/(m^2) as calculated from the following:    Height as of this encounter: 1.829 m (6').    Weight as of this encounter: 108 kg (238 lb).  Medication Reconciliation: complete

## 2018-01-08 NOTE — PATIENT INSTRUCTIONS

## 2018-01-08 NOTE — PROGRESS NOTES
ANTICOAGULATION FOLLOW-UP CLINIC VISIT    Patient Name:  William Lechuga  Date:  1/8/2018  Contact Type:  Face to Face    SUBJECTIVE:     Patient Findings     Positives No Problem Findings           OBJECTIVE    INR Protime   Date Value Ref Range Status   01/08/2018 2.7 (A) 0.86 - 1.14 Final       ASSESSMENT / PLAN  INR assessment THER    Recheck INR In: 6 WEEKS will be in [place], orders given to have INR done and faxed to us in Feb.   INR Location Clinic      Anticoagulation Summary as of 1/8/2018     INR goal 2.0-3.0   Today's INR 2.7   Maintenance plan 7.5 mg (5 mg x 1.5) on Mon; 5 mg (5 mg x 1) all other days   Full instructions 7.5 mg on Mon; 5 mg all other days   Weekly total 37.5 mg   No change documented Arminda Liu   Plan last modified Ariana Arriaga (3/29/2016)   Next INR check 2/19/2018   Target end date     Indications   Long-term (current) use of anticoagulants [Z79.01] [Z79.01]  Chronic atrial fibrillation (H) [I48.2]         Anticoagulation Episode Summary     INR check location     Preferred lab     Send INR reminders to [facility]    Comments       Anticoagulation Care Providers     Provider Role Specialty Phone number    Narcisa Mcmahon MD [facility] [phone]            See the Encounter Report to view Anticoagulation Flowsheet and Dosing Calendar (Go to Encounters tab in chart review, and find the Anticoagulation Therapy Visit)        ARMINDA LIU

## 2018-01-08 NOTE — MR AVS SNAPSHOT
After Visit Summary   1/8/2018    William Lechuga    MRN: [id number]           Patient Information     Date Of Birth          1940        Visit Information        Provider Department      1/8/2018 2:00 PM Jaylene Viera PA [facility]        Today's Diagnoses     NIELS (obstructive sleep apnea)    -  1      Care Instructions      Your BMI is Body mass index is 32.28 kg/(m^2).  Weight management is a personal decision.  If you are interested in exploring weight loss strategies, the following discussion covers the approaches that may be successful. Body mass index (BMI) is one way to tell whether you are at a healthy weight, overweight, or obese. It measures your weight in relation to your height.  A BMI of 18.5 to 24.9 is in the healthy range. A person with a BMI of 25 to 29.9 is considered overweight, and someone with a BMI of 30 or greater is considered obese. More than two-thirds of American adults are considered overweight or obese.  Being overweight or obese increases the risk for further weight gain. Excess weight may lead to heart disease and diabetes.  Creating and following plans for healthy eating and physical activity may help you improve your health.  Weight control is part of healthy lifestyle and includes exercise, emotional health, and healthy eating habits. Careful eating habits lifelong are the mainstay of weight control. Though there are significant health benefits from weight loss, long-term weight loss with diet alone may be very difficult to achieve- studies show long-term success with dietary management in less than 10% of people. Attaining a healthy weight may be especially difficult to achieve in those with severe obesity. In some cases, medications, devices and surgical management might be considered.  What can you do?  If you are overweight or obese and are interested in methods for weight loss, you should discuss this with your provider.     Consider reducing  daily calorie intake by 500 calories.     Keep a food journal.     Avoiding skipping meals, consider cutting portions instead.    Diet combined with exercise helps maintain muscle while optimizing fat loss. Strength training is particularly important for building and maintaining muscle mass. Exercise helps reduce stress, increase energy, and improves fitness. Increasing exercise without diet control, however, may not burn enough calories to loose weight.       Start walking three days a week 10-20 minutes at a time    Work towards walking thirty minutes five days a week     Eventually, increase the speed of your walking for 1-2 minutes at time    In addition, we recommend that you review healthy lifestyles and methods for weight loss available through the National Institutes of Health patient information sites:  http://win.niddk.nih.gov/publications/index.htm    And look into health and wellness programs that may be available through your health insurance provider, employer, local community center, or agapito club.    Weight management plan: Patient was referred to their PCP to discuss a diet and exercise plan.        Your Body mass index is 32.28 kg/(m^2).  Weight management is a personal decision.  If you are interested in exploring weight loss strategies, the following discussion covers the approaches that may be successful. Body mass index (BMI) is one way to tell whether you are at a healthy weight, overweight, or obese. It measures your weight in relation to your height.  A BMI of 18.5 to 24.9 is in the healthy range. A person with a BMI of 25 to 29.9 is considered overweight, and someone with a BMI of 30 or greater is considered obese. More than two-thirds of American adults are considered overweight or obese.  Being overweight or obese increases the risk for further weight gain. Excess weight may lead to heart disease and diabetes.  Creating and following plans for healthy eating and physical activity may help  you improve your health.  Weight control is part of healthy lifestyle and includes exercise, emotional health, and healthy eating habits. Careful eating habits lifelong are the mainstay of weight control. Though there are significant health benefits from weight loss, long-term weight loss with diet alone may be very difficult to achieve- studies show long-term success with dietary management in less than 10% of people. Attaining a healthy weight may be especially difficult to achieve in those with severe obesity. In some cases, medications, devices and surgical management might be considered.  What can you do?  If you are overweight or obese and are interested in methods for weight loss, you should discuss this with your provider.     Consider reducing daily calorie intake by 500 calories.     Keep a food journal.     Avoiding skipping meals, consider cutting portions instead.    Diet combined with exercise helps maintain muscle while optimizing fat loss. Strength training is particularly important for building and maintaining muscle mass. Exercise helps reduce stress, increase energy, and improves fitness. Increasing exercise without diet control, however, may not burn enough calories to loose weight.       Start walking three days a week 10-20 minutes at a time    Work towards walking thirty minutes five days a week     Eventually, increase the speed of your walking for 1-2 minutes at time    In addition, we recommend that you review healthy lifestyles and methods for weight loss available through the National Institutes of Health patient information sites:  http://win.niddk.nih.gov/publications/index.htm    And look into health and wellness programs that may be available through your health insurance provider, employer, local community center, or agapito club.    Weight management plan: Patient was referred to their PCP to discuss a diet and exercise plan.            Follow-ups after your visit        Follow-up  "notes from your care team     Return in 1 year (on 2019) for PAP follow up.      Who to contact     If you have questions or need follow up information about today's clinic visit or your schedule please contact [facility] directly at [phone].  Normal or non-critical lab and imaging results will be communicated to you by MyChart, letter or phone within 4 business days after the clinic has received the results. If you do not hear from us within 7 days, please contact the clinic through [a]list gameshart or phone. If you have a critical or abnormal lab result, we will notify you by phone as soon as possible.  Submit refill requests through [facility] or call your pharmacy and they will forward the refill request to us. Please allow 3 business days for your refill to be completed.          Additional Information About Your Visit        MyChart Information     [facility] lets you send messages to your doctor, view your test results, renew your prescriptions, schedule appointments and more. To sign up, go to [email or web address] . Click on \"Log in\" on the left side of the screen, which will take you to the Welcome page. Then click on \"Sign up Now\" on the right side of the page.     You will be asked to enter the access code listed below, as well as some personal information. Please follow the directions to create your username and password.     Your access code is: [email or web address]  Expires: 2018  2:17 PM     Your access code will  in 90 days. If you need help or a new code, please call your [place] clinic or [phone].        Care EveryWhere ID     This is your Care EveryWhere ID. This could be used by other organizations to access your [place] medical records  [id number]        Your Vitals Were     Pulse Height Pulse Oximetry BMI (Body Mass Index)          72 1.829 m (6') 96% 32.28 kg/m2         Blood Pressure from Last 3 Encounters:   18 150/76   17 133/82   16 130/84    " Weight from Last 3 Encounters:   01/08/18 108 kg (238 lb)   01/06/17 103.4 kg (228 lb)   11/28/16 101.6 kg (224 lb)              We Performed the Following     Sleep Comprehensive DME          Today's Medication Changes          These changes are accurate as of: 1/8/18  2:17 PM.  If you have any questions, ask your nurse or doctor.               Start taking these medicines.        Dose/Directions    order for DME   Used for:  Long-term (current) use of anticoagulants, Chronic atrial fibrillation (H)        please draw INR the week of February 19-23. Please fax results to [phone]. [facility].  phone number [phone]   Quantity:  1 each   Refills:  1            Where to get your medicines      Some of these will need a paper prescription and others can be bought over the counter.  Ask your nurse if you have questions.     Bring a paper prescription for each of these medications     order for DME                Primary Care Provider Office Phone # Fax #    Narcisa Mcmahon [phone]       [address] [address]        Equal Access to Services     CRISTIAN CALIX : Hadii jaylin donohueo Sofausto, waaxda luqadaha, qaybta kaalmada adeduke, candy coates. So [facility] [phone].    ATENCIÓN: Si habla español, tiene a jaramillo disposición servicios gratuitos de asistencia lingüística. [facility] [phone].    We comply with applicable federal civil rights laws and [place] laws. We do not discriminate on the basis of race, color, national origin, age, disability, sex, sexual orientation, or gender identity.            Thank you!     Thank you for choosing [facility]  for your care. Our goal is always to provide you with excellent care. Hearing back from our patients is one way we can continue to improve our services. Please take a few minutes to complete the written survey that you may receive in  the mail after your visit with us. Thank you!             Your Updated Medication List - Protect others around you: Learn how to safely use, store and throw away your medicines at www.disposemymeds.org.          This list is accurate as of: 1/8/18  2:17 PM.  Always use your most recent med list.                   Brand Name Dispense Instructions for use Diagnosis    hydrochlorothiazide 25 MG tablet    HYDRODIURIL     1 TABLET EVERY MORNING        levothyroxine 137 MCG tablet    SYNTHROID/LEVOTHROID     137 mcg daily        lisinopril 10 MG tablet    PRINIVIL/ZESTRIL     1 TABLET DAILY        metoprolol 100 MG 24 hr tablet    TOPROL-XL     100 mg daily        order for DME     1 each    please draw INR the week of February 19-23. Please fax results to [phone]. [facility].  phone number [phone]    Long-term (current) use of anticoagulants, Chronic atrial fibrillation (H)       simvastatin 40 MG tablet    ZOCOR     40 mg daily        VITAMIN D3 PO      Take 500 Units by mouth daily        warfarin 5 MG tablet    COUMADIN     Take 1.5 tablets Mondays and Thursdays and 1 tablet all other days of the week or as directed    Chronic atrial fibrillation (H)

## 2018-02-21 ENCOUNTER — TRANSFERRED RECORDS (OUTPATIENT)
Dept: HEALTH INFORMATION MANAGEMENT | Facility: CLINIC | Age: 78
End: 2018-02-21

## 2018-02-21 LAB — INR PPP: 2.1

## 2018-02-22 ENCOUNTER — TELEPHONE (OUTPATIENT)
Dept: NURSING | Facility: CLINIC | Age: 78
End: 2018-02-22

## 2018-02-22 ENCOUNTER — ANTICOAGULATION THERAPY VISIT (OUTPATIENT)
Dept: NURSING | Facility: CLINIC | Age: 78
End: 2018-02-22
Payer: MEDICARE

## 2018-02-22 DIAGNOSIS — Z79.01 LONG-TERM (CURRENT) USE OF ANTICOAGULANTS: ICD-10-CM

## 2018-02-22 DIAGNOSIS — I48.20 CHRONIC ATRIAL FIBRILLATION (H): ICD-10-CM

## 2018-02-22 LAB — INR POINT OF CARE: 2.1 (ref 0.86–1.14)

## 2018-02-22 PROCEDURE — 36416 COLLJ CAPILLARY BLOOD SPEC: CPT | Performed by: FAMILY MEDICINE

## 2018-02-22 PROCEDURE — 99207 ZZC NO CHARGE NURSE ONLY: CPT | Performed by: FAMILY MEDICINE

## 2018-02-22 PROCEDURE — 85610 PROTHROMBIN TIME: CPT | Mod: QW | Performed by: FAMILY MEDICINE

## 2018-02-22 NOTE — TELEPHONE ENCOUNTER
Received fax from [place], lab with patient's INR.  INR =2.1  Arminda Liu RN  Spoke with patient, dosing instructions given, RTC 6 weeks, appt scheduled.  See anticoag therapy encounter.  Arminda Liu RN

## 2018-02-22 NOTE — PROGRESS NOTES
ANTICOAGULATION FOLLOW-UP CLINIC VISIT    Patient Name:  William Lechuga  Date:  2/22/2018  Contact Type:  Telephone    SUBJECTIVE:     Patient Findings     Positives No Problem Findings           OBJECTIVE    INR Protime   Date Value Ref Range Status   02/22/2018 2.1 (A) 0.86 - 1.14 Final       ASSESSMENT / PLAN  INR assessment THER    Recheck INR In: 6 WEEKS    INR Location Clinic      Anticoagulation Summary as of 2/22/2018     INR goal 2.0-3.0   Today's INR 2.1   Maintenance plan 7.5 mg (5 mg x 1.5) on Mon; 5 mg (5 mg x 1) all other days   Full instructions 7.5 mg on Mon; 5 mg all other days   Weekly total 37.5 mg   No change documented Arminda Liu   Plan last modified Ariana Arriaga (3/29/2016)   Next INR check 4/2/2018   Target end date     Indications   Long-term (current) use of anticoagulants [Z79.01] [Z79.01]  Chronic atrial fibrillation (H) [I48.2]         Anticoagulation Episode Summary     INR check location     Preferred lab     Send INR reminders to [facility]    Comments       Anticoagulation Care Providers     Provider Role Specialty Phone number    Narcisa Mcmahon MD [facility] [phone]            See the Encounter Report to view Anticoagulation Flowsheet and Dosing Calendar (Go to Encounters tab in chart review, and find the Anticoagulation Therapy Visit)    Patient in [place], results fax to us by [facility]. Patient voiced understanding and agreement    ARMINDA LIU

## 2018-02-22 NOTE — MR AVS SNAPSHOT
William ALEXANDER Alexandrea   2/22/2018   Anticoagulation Therapy Visit    Description:  77 year old male   Provider:  Narcisa Mcmahon MD   Department:  Be Nurse           INR as of 2/22/2018     Today's INR 2.1      Anticoagulation Summary as of 2/22/2018     INR goal 2.0-3.0   Today's INR 2.1   Full instructions 7.5 mg on Mon; 5 mg all other days   Next INR check 4/2/2018    Indications   Long-term (current) use of anticoagulants [Z79.01] [Z79.01]  Chronic atrial fibrillation (H) [I48.2]         Your next Anticoagulation Clinic appointment(s)     Apr 02, 2018  8:45 AM CDT   Anticoagulation Visit with BE ANTI COAG   [facility] [place] ([facility])    [address]  [place] [address] [phone]              Contact Numbers     [facility]  Please call [phone] with any problems or questions regarding your therapy, or to cancel or reschedule your appointment.          February 2018 Details    Sun Mon Tue Wed Thu Fri Sat         1               2               3                 4               5               6               7               8               9               10                 11               12               13               14               15               16               17                 18               19               20               21               22      5 mg   See details      23      5 mg         24      5 mg           25      5 mg         26      7.5 mg         27      5 mg         28      5 mg             Date Details   02/22 This INR check               How to take your warfarin dose     To take:  5 mg Take 1 of the 5 mg tablets.    To take:  7.5 mg Take 1.5 of the 5 mg tablets.           March 2018 Details    Sun Mon Tue Wed Thu Fri Sat         1      5 mg         2      5 mg         3      5 mg           4      5 mg         5      7.5 mg         6      5 mg         7      5 mg         8      5 mg         9      5 mg         10      5 mg            11      5 mg         12      7.5 mg         13      5 mg         14      5 mg         15      5 mg         16      5 mg         17      5 mg           18      5 mg         19      7.5 mg         20      5 mg         21      5 mg         22      5 mg         23      5 mg         24      5 mg           25      5 mg         26      7.5 mg         27      5 mg         28      5 mg         29      5 mg         30      5 mg         31      5 mg          Date Details   No additional details            How to take your warfarin dose     To take:  5 mg Take 1 of the 5 mg tablets.    To take:  7.5 mg Take 1.5 of the 5 mg tablets.           April 2018 Details    Sun Mon Tue Wed Thu Fri Sat     1      5 mg         2            3               4               5               6               7                 8               9               10               11               12               13               14                 15               16               17               18               19               20               21                 22               23               24               25               26               27               28                 29               30                     Date Details   No additional details    Date of next INR:  4/2/2018         How to take your warfarin dose     To take:  5 mg Take 1 of the 5 mg tablets.    To take:  7.5 mg Take 1.5 of the 5 mg tablets.

## 2018-04-02 ENCOUNTER — ANTICOAGULATION THERAPY VISIT (OUTPATIENT)
Dept: NURSING | Facility: CLINIC | Age: 78
End: 2018-04-02
Payer: MEDICARE

## 2018-04-02 DIAGNOSIS — I48.20 CHRONIC ATRIAL FIBRILLATION (H): ICD-10-CM

## 2018-04-02 DIAGNOSIS — Z79.01 LONG-TERM (CURRENT) USE OF ANTICOAGULANTS: ICD-10-CM

## 2018-04-02 LAB — INR POINT OF CARE: 2.8 (ref 0.86–1.14)

## 2018-04-02 PROCEDURE — 99207 ZZC NO CHARGE NURSE ONLY: CPT

## 2018-04-02 PROCEDURE — 85610 PROTHROMBIN TIME: CPT | Mod: QW

## 2018-04-02 PROCEDURE — 36416 COLLJ CAPILLARY BLOOD SPEC: CPT

## 2018-04-02 NOTE — MR AVS SNAPSHOT
Willaim ALEXANDER Alexandrea   4/2/2018 8:45 AM   Anticoagulation Therapy Visit    Description:  77 year old male   Provider:  ASHLEY ANTI COAG   Department:  Be Nurse           INR as of 4/2/2018     Today's INR 2.8      Anticoagulation Summary as of 4/2/2018     INR goal 2.0-3.0   Today's INR 2.8   Full instructions 7.5 mg on Mon; 5 mg all other days   Next INR check 5/1/2018    Indications   Long-term (current) use of anticoagulants [Z79.01] [Z79.01]  Chronic atrial fibrillation (H) [I48.2]         Contact Numbers     [facility]  Please call [phone] with any problems or questions regarding your therapy, or to cancel or reschedule your appointment.          April 2018 Details    Sun Mon Tue Wed Thu Fri Sat     1               2      7.5 mg   See details      3      5 mg         4      5 mg         5      5 mg         6      5 mg         7      5 mg           8      5 mg         9      7.5 mg         10      5 mg         11      5 mg         12      5 mg         13      5 mg         14      5 mg           15      5 mg         16      7.5 mg         17      5 mg         18      5 mg         19      5 mg         20      5 mg         21      5 mg           22      5 mg         23      7.5 mg         24      5 mg         25      5 mg         26      5 mg         27      5 mg         28      5 mg           29      5 mg         30      7.5 mg               Date Details   04/02 This INR check               How to take your warfarin dose     To take:  5 mg Take 1 of the 5 mg tablets.    To take:  7.5 mg Take 1.5 of the 5 mg tablets.           May 2018 Details    Sun Mon Tue Wed Thu Fri Sat       1            2               3               4               5                 6               7               8               9               10               11               12                 13               14               15               16               17               18               19                 20               21                22               23               24               25               26                 27               28               29               30               31                  Date Details   No additional details    Date of next INR:  5/1/2018         How to take your warfarin dose     To take:  5 mg Take 1 of the 5 mg tablets.

## 2018-04-02 NOTE — PROGRESS NOTES
ANTICOAGULATION FOLLOW-UP CLINIC VISIT    Patient Name:  William Lechuga  Date:  4/2/2018  Contact Type:  Face to Face    SUBJECTIVE:     Patient Findings     Positives No Problem Findings           OBJECTIVE    INR Protime   Date Value Ref Range Status   04/02/2018 2.8 (A) 0.86 - 1.14 Final       ASSESSMENT / PLAN  INR assessment THER    Recheck INR In: 4 WEEKS    INR Location Clinic      Anticoagulation Summary as of 4/2/2018     INR goal 2.0-3.0   Today's INR 2.8   Maintenance plan 7.5 mg (5 mg x 1.5) on Mon; 5 mg (5 mg x 1) all other days   Full instructions 7.5 mg on Mon; 5 mg all other days   Weekly total 37.5 mg   No change documented Arminda Liu   Plan last modified Ariana rAriaga (3/29/2016)   Next INR check 5/1/2018   Target end date     Indications   Long-term (current) use of anticoagulants [Z79.01] [Z79.01]  Chronic atrial fibrillation (H) [I48.2]         Anticoagulation Episode Summary     INR check location     Preferred lab     Send INR reminders to [facility] ANT[facility]AG CLINIC    Comments       Anticoagulation Care Providers     Provider Role Specialty Phone number    Narcisa Mcmahon MD [facility] [phone]            See the Encounter Report to view Anticoagulation Flowsheet and Dosing Calendar (Go to Encounters tab in chart review, and find the Anticoagulation Therapy Visit)    Will have INR done at [facility] on 5/15/18    ARMINDA LIU

## 2018-04-02 NOTE — PROGRESS NOTES
ANTICOAGULATION FOLLOW-UP CLINIC VISIT    Patient Name:  William Lechuga  Date:  4/2/2018  Contact Type:  Face to Face    SUBJECTIVE:     Patient Findings     Positives No Problem Findings           OBJECTIVE    INR Protime   Date Value Ref Range Status   04/02/2018 2.8 (A) 0.86 - 1.14 Final       ASSESSMENT / PLAN  INR assessment THER    Recheck INR In: 4 WEEKS    INR Location Clinic      Anticoagulation Summary as of 4/2/2018     INR goal 2.0-3.0   Today's INR 2.8   Maintenance plan 7.5 mg (5 mg x 1.5) on Mon; 5 mg (5 mg x 1) all other days   Full instructions 7.5 mg on Mon; 5 mg all other days   Weekly total 37.5 mg   No change documented Arminda Liu   Plan last modified Ariana Arriaga (3/29/2016)   Next INR check 5/1/2018   Target end date     Indications   Long-term (current) use of anticoagulants [Z79.01] [Z79.01]  Chronic atrial fibrillation (H) [I48.2]         Anticoagulation Episode Summary     INR check location     Preferred lab     Send INR reminders to [facility]    Comments       Anticoagulation Care Providers     Provider Role Specialty Phone number    Narcisa Mcmahon MD [facility] [phone]            See the Encounter Report to view Anticoagulation Flowsheet and Dosing Calendar (Go to Encounters tab in chart review, and find the Anticoagulation Therapy Visit)        ARMINDA LIU

## 2018-05-01 ENCOUNTER — TRANSFERRED RECORDS (OUTPATIENT)
Dept: HEALTH INFORMATION MANAGEMENT | Facility: CLINIC | Age: 78
End: 2018-05-01

## 2018-05-01 LAB
AST SERPL-CCNC: 34 U/L (ref 8–48)
CHOLEST SERPL-MCNC: 157 MG/DL
CREAT SERPL-MCNC: 0.9 MG/DL (ref 0.8–1.3)
GLUCOSE SERPL-MCNC: 111 MG/DL (ref 70–100)
HDLC SERPL-MCNC: 77 MG/DL
INR POINT OF CARE: 2.7 (ref 0.9–1.1)
LDLC SERPL CALC-MCNC: 81 MG/DL
NONHDLC SERPL-MCNC: 80 MG/DL
POTASSIUM SERPL-SCNC: 5 MMOL/L (ref 3.6–5.2)
TRIGL SERPL-MCNC: 97 MG/DL
TSH SERPL-ACNC: 0.8 MIU/L (ref 0.3–4.2)

## 2018-05-02 ENCOUNTER — TELEPHONE (OUTPATIENT)
Dept: NURSING | Facility: CLINIC | Age: 78
End: 2018-05-02

## 2018-05-02 ENCOUNTER — TRANSFERRED RECORDS (OUTPATIENT)
Dept: HEALTH INFORMATION MANAGEMENT | Facility: CLINIC | Age: 78
End: 2018-05-02

## 2018-05-02 NOTE — TELEPHONE ENCOUNTER
"Spoke with patient and he is at the [facility] right now, he stated \"I just had my INR and it is 2.7.\"  Fax number to clinic given so he will have results faxed since care everywhere needs patient signature.  Then will do INR visit over the phone.  Arminda Liu RN      "

## 2018-05-03 ENCOUNTER — TRANSFERRED RECORDS (OUTPATIENT)
Dept: HEALTH INFORMATION MANAGEMENT | Facility: CLINIC | Age: 78
End: 2018-05-03

## 2018-05-04 ENCOUNTER — ANTICOAGULATION THERAPY VISIT (OUTPATIENT)
Dept: NURSING | Facility: CLINIC | Age: 78
End: 2018-05-04
Payer: MEDICARE

## 2018-05-04 DIAGNOSIS — Z79.01 LONG-TERM (CURRENT) USE OF ANTICOAGULANTS: ICD-10-CM

## 2018-05-04 DIAGNOSIS — I48.20 CHRONIC ATRIAL FIBRILLATION (H): ICD-10-CM

## 2018-05-04 PROCEDURE — 99207 ZZC NO CHARGE NURSE ONLY: CPT | Performed by: FAMILY MEDICINE

## 2018-05-04 PROCEDURE — 85610 PROTHROMBIN TIME: CPT | Mod: QW | Performed by: FAMILY MEDICINE

## 2018-05-04 PROCEDURE — 36416 COLLJ CAPILLARY BLOOD SPEC: CPT | Performed by: FAMILY MEDICINE

## 2018-05-04 NOTE — MR AVS SNAPSHOT
William ALEXANDER Angelaalonzo   5/4/2018   Anticoagulation Therapy Visit    Description:  77 year old male   Provider:  Narcisa Mcmahon MD   Department:  Be Nurse           INR as of 5/4/2018     Today's INR 2.7 (5/1/2018)      Anticoagulation Summary as of 5/4/2018     INR goal 2.0-3.0   Today's INR 2.7 (5/1/2018)   Full instructions 7.5 mg on Mon; 5 mg all other days   Next INR check 6/4/2018    Indications   Long-term (current) use of anticoagulants [Z79.01] [Z79.01]  Chronic atrial fibrillation (H) [I48.2]         Your next Anticoagulation Clinic appointment(s)     Jun 04, 2018  8:45 AM CDT   Anticoagulation Visit with BE ANTI COAG   [place] [facility] ([facility] [place])    [address]  [place] [id number] [phone]              Contact Numbers     [facility]  Please call [phone] with any problems or questions regarding your therapy, or to cancel or reschedule your appointment.          May 2018 Details    Sun Mon Tue Wed Thu Fri Sat       1               2               3               4      5 mg   See details      5      5 mg           6      5 mg         7      7.5 mg         8      5 mg         9      5 mg         10      5 mg         11      5 mg         12      5 mg           13      5 mg         14      7.5 mg         15      5 mg         16      5 mg         17      5 mg         18      5 mg         19      5 mg           20      5 mg         21      7.5 mg         22      5 mg         23      5 mg         24      5 mg         25      5 mg         26      5 mg           27      5 mg         28      7.5 mg         29      5 mg         30      5 mg         31      5 mg            Date Details   05/04 This INR check               How to take your warfarin dose     To take:  5 mg Take 1 of the 5 mg tablets.    To take:  7.5 mg Take 1.5 of the 5 mg tablets.           June 2018 Details    Sun Mon Tue Wed Thu Fri Sat          1      5 mg         2      5 mg           3      5  mg         4            5               6               7               8               9                 10               11               12               13               14               15               16                 17               18               19               20               21               22               23                 24               25               26               27               28               29               30                Date Details   No additional details    Date of next INR:  6/4/2018         How to take your warfarin dose     To take:  5 mg Take 1 of the 5 mg tablets.    To take:  7.5 mg Take 1.5 of the 5 mg tablets.

## 2018-05-04 NOTE — PROGRESS NOTES
ANTICOAGULATION FOLLOW-UP CLINIC VISIT    Patient Name:  William Lechuga  Date:  5/4/2018  Contact Type:  Telephone    SUBJECTIVE:        OBJECTIVE    INR Protime   Date Value Ref Range Status   05/01/2018 2.7 (A) 0.86 - 1.14 Final       ASSESSMENT / PLAN  INR assessment THER    Recheck INR In: 6 WEEKS    INR Location Outside lab [place]     Anticoagulation Summary as of 5/4/2018     INR goal 2.0-3.0   Today's INR 2.7 (5/1/2018)   Maintenance plan 7.5 mg (5 mg x 1.5) on Mon; 5 mg (5 mg x 1) all other days   Full instructions 7.5 mg on Mon; 5 mg all other days   Weekly total 37.5 mg   No change documented Fartun Angulo RN   Plan last modified Ariana Arriaga (3/29/2016)   Next INR check 6/4/2018   Target end date     Indications   Long-term (current) use of anticoagulants [Z79.01] [Z79.01]  Chronic atrial fibrillation (H) [I48.2]         Anticoagulation Episode Summary     INR check location     Preferred lab     Send INR reminders to [facility]    Comments       Anticoagulation Care Providers     Provider Role Specialty Phone number    Narcisa Mcmahon MD [facility] [phone]            See the Encounter Report to view Anticoagulation Flowsheet and Dosing Calendar (Go to Encounters tab in chart review, and find the Anticoagulation Therapy Visit)        Fartun Angulo RN                 ANTICOAGULATION FOLLOW-UP CLINIC VISIT    Patient Name:  William Lechuga  Date:  5/4/2018  Contact Type:  Telephone    SUBJECTIVE:     Patient Findings     Positives No Problem Findings           OBJECTIVE    INR Protime   Date Value Ref Range Status   05/01/2018 2.7 (A) 0.86 - 1.14 Final       ASSESSMENT / PLAN  INR assessment THER    Recheck INR In: 6 WEEKS    INR Location Outside lab [place]     Anticoagulation Summary as of 5/4/2018     INR goal 2.0-3.0   Today's INR 2.7 (5/1/2018)   Maintenance plan 7.5 mg (5 mg x 1.5) on Mon; 5 mg (5 mg x 1) all other days   Full instructions 7.5 mg on Mon; 5 mg all  other days   Weekly total 37.5 mg   No change documented Fartun Angulo, RN   Plan last modified Ariana Arriaga (3/29/2016)   Next INR check 6/4/2018   Target end date     Indications   Long-term (current) use of anticoagulants [Z79.01] [Z79.01]  Chronic atrial fibrillation (H) [I48.2]         Anticoagulation Episode Summary     INR check location     Preferred lab     Send INR reminders to [facility]    Comments       Anticoagulation Care Providers     Provider Role Specialty Phone number    Narcisa Mcmahon MD [facility] [phone]            See the Encounter Report to view Anticoagulation Flowsheet and Dosing Calendar (Go to Encounters tab in chart review, and find the Anticoagulation Therapy Visit)        Fartun Angulo RN

## 2018-05-08 ENCOUNTER — TRANSFERRED RECORDS (OUTPATIENT)
Dept: HEALTH INFORMATION MANAGEMENT | Facility: CLINIC | Age: 78
End: 2018-05-08

## 2018-06-19 ENCOUNTER — ANTICOAGULATION THERAPY VISIT (OUTPATIENT)
Dept: NURSING | Facility: CLINIC | Age: 78
End: 2018-06-19
Payer: MEDICARE

## 2018-06-19 DIAGNOSIS — I48.20 CHRONIC ATRIAL FIBRILLATION (H): ICD-10-CM

## 2018-06-19 DIAGNOSIS — Z79.01 LONG-TERM (CURRENT) USE OF ANTICOAGULANTS: ICD-10-CM

## 2018-06-19 LAB — INR POINT OF CARE: 2.6 (ref 0.86–1.14)

## 2018-06-19 PROCEDURE — 36416 COLLJ CAPILLARY BLOOD SPEC: CPT

## 2018-06-19 PROCEDURE — 85610 PROTHROMBIN TIME: CPT | Mod: QW

## 2018-06-19 PROCEDURE — 99207 ZZC NO CHARGE NURSE ONLY: CPT

## 2018-06-19 NOTE — MR AVS SNAPSHOT
William ALEXANDER Angelaalonzo   6/19/2018 8:45 AM   Anticoagulation Therapy Visit    Description:  77 year old male   Provider:  ASHLEY ANTI COAG   Department:  Be Nurse           INR as of 6/19/2018     Today's INR 2.6      Anticoagulation Summary as of 6/19/2018     INR goal 2.0-3.0   Today's INR 2.6   Full warfarin instructions 7.5 mg on Mon; 5 mg all other days   Next INR check 7/30/2018    Indications   Long-term (current) use of anticoagulants [Z79.01] [Z79.01]  Chronic atrial fibrillation (H) [I48.2]         Your next Anticoagulation Clinic appointment(s)     Jun 19, 2018  8:45 AM CDT   Anticoagulation Visit with ASHLEY ANTI COAG   [facility] [place] ([facility])    [address]  [place] [address]   [phone]            Jul 30, 2018  8:30 AM CDT   Anticoagulation Visit with BE ANTI COAG   [facility] [place] ([facility]    [address]  [place] [address]   [phone]              Contact Numbers     [facility]  Please call [phone] with any problems or questions regarding your therapy, or to cancel or reschedule your appointment.          June 2018 Details    Sun Mon Tue Wed Thu Fri Sat          1               2                 3               4               5               6               7               8               9                 10               11               12               13               14               15               16                 17               18               19      5 mg   See details      20      5 mg         21      5 mg         22      5 mg         23      5 mg           24      5 mg         25      7.5 mg         26      5 mg         27      5 mg         28      5 mg         29      5 mg         30      5 mg          Date Details   06/19 This INR check               How to take your warfarin dose     To take:  5 mg Take 1 of the 5 mg tablets.    To take:  7.5 mg Take 1.5 of the 5 mg tablets.           July  2018 Details    Sun Mon Tue Wed Thu Fri Sat     1      5 mg         2      7.5 mg         3      5 mg         4      5 mg         5      5 mg         6      5 mg         7      5 mg           8      5 mg         9      7.5 mg         10      5 mg         11      5 mg         12      5 mg         13      5 mg         14      5 mg           15      5 mg         16      7.5 mg         17      5 mg         18      5 mg         19      5 mg         20      5 mg         21      5 mg           22      5 mg         23      7.5 mg         24      5 mg         25      5 mg         26      5 mg         27      5 mg         28      5 mg           29      5 mg         30            31                    Date Details   No additional details    Date of next INR:  7/30/2018         How to take your warfarin dose     To take:  5 mg Take 1 of the 5 mg tablets.    To take:  7.5 mg Take 1.5 of the 5 mg tablets.

## 2018-06-19 NOTE — PROGRESS NOTES
ANTICOAGULATION FOLLOW-UP CLINIC VISIT    Patient Name:  William Lechuga  Date:  6/19/2018  Contact Type:  Face to Face    SUBJECTIVE:     Patient Findings     Positives No Problem Findings           OBJECTIVE    INR Protime   Date Value Ref Range Status   06/19/2018 2.6 (A) 0.86 - 1.14 Final       ASSESSMENT / PLAN  INR assessment THER    Recheck INR In: 6 WEEKS    INR Location Clinic      Anticoagulation Summary as of 6/19/2018     INR goal 2.0-3.0   Today's INR 2.6   Warfarin maintenance plan 7.5 mg (5 mg x 1.5) on Mon; 5 mg (5 mg x 1) all other days   Full warfarin instructions 7.5 mg on Mon; 5 mg all other days   Weekly warfarin total 37.5 mg   No change documented Arminda Liu   Plan last modified Ariana Arriaga (3/29/2016)   Next INR check 7/30/2018   Target end date     Indications   Long-term (current) use of anticoagulants [Z79.01] [Z79.01]  Chronic atrial fibrillation (H) [I48.2]         Anticoagulation Episode Summary     INR check location     Preferred lab     Send INR reminders to [facility]    Comments       Anticoagulation Care Providers     Provider Role Specialty Phone number    Narcisa Mcmahon MD [facility] [phone]            See the Encounter Report to view Anticoagulation Flowsheet and Dosing Calendar (Go to Encounters tab in chart review, and find the Anticoagulation Therapy Visit)        ARMINDA LIU

## 2018-07-30 ENCOUNTER — ANTICOAGULATION THERAPY VISIT (OUTPATIENT)
Dept: NURSING | Facility: CLINIC | Age: 78
End: 2018-07-30
Payer: MEDICARE

## 2018-07-30 DIAGNOSIS — I48.20 CHRONIC ATRIAL FIBRILLATION (H): ICD-10-CM

## 2018-07-30 DIAGNOSIS — Z79.01 LONG-TERM (CURRENT) USE OF ANTICOAGULANTS: ICD-10-CM

## 2018-07-30 LAB — INR POINT OF CARE: 3.7 (ref 0.86–1.14)

## 2018-07-30 PROCEDURE — 85610 PROTHROMBIN TIME: CPT | Mod: QW

## 2018-07-30 PROCEDURE — 99207 ZZC NO CHARGE NURSE ONLY: CPT

## 2018-07-30 PROCEDURE — 36416 COLLJ CAPILLARY BLOOD SPEC: CPT

## 2018-07-30 NOTE — MR AVS SNAPSHOT
William ALEXANDER Angelaalonzo   7/30/2018 8:30 AM   Anticoagulation Therapy Visit    Description:  77 year old male   Provider:  ASHLEY ANTI COAG   Department:  Be Nurse           INR as of 7/30/2018     Today's INR 3.7!      Anticoagulation Summary as of 7/30/2018     INR goal 2.0-3.0   Today's INR 3.7!   Full warfarin instructions 7/31: Hold; Otherwise 7.5 mg on Mon; 5 mg all other days   Next INR check 8/15/2018    Indications   Long-term (current) use of anticoagulants [Z79.01] [Z79.01]  Chronic atrial fibrillation (H) [I48.2]         Your next Anticoagulation Clinic appointment(s)     Aug 15, 2018  3:15 PM CDT   Anticoagulation Visit with BE ANTI COAG   [place] [facility] ([facility] [place])    [address]  [place] [address] [phone]              Contact Numbers     [facility]  Please call [phone] with any problems or questions regarding your therapy, or to cancel or reschedule your appointment.          July 2018 Details    Sun Mon Tue Wed Thu Fri Sat     1               2               3               4               5               6               7                 8               9               10               11               12               13               14                 15               16               17               18               19               20               21                 22               23               24               25               26               27               28                 29               30      7.5 mg   See details      31      Hold              Date Details   07/30 This INR check               How to take your warfarin dose     To take:  7.5 mg Take 1.5 of the 5 mg tablets.    Hold Do not take your warfarin dose. See the Details table to the right for additional instructions.                August 2018 Details    Sun Mon Tue Wed Thu Fri Sat        1      5 mg         2      5 mg         3      5 mg         4      5 mg           5       5 mg         6      7.5 mg         7      5 mg         8      5 mg         9      5 mg         10      5 mg         11      5 mg           12      5 mg         13      7.5 mg         14      5 mg         15            16               17               18                 19               20               21               22               23               24               25                 26               27               28               29               30               31                 Date Details   No additional details    Date of next INR:  8/15/2018         How to take your warfarin dose     To take:  5 mg Take 1 of the 5 mg tablets.    To take:  7.5 mg Take 1.5 of the 5 mg tablets.

## 2018-07-30 NOTE — PROGRESS NOTES
ANTICOAGULATION FOLLOW-UP CLINIC VISIT    Patient Name:  William Lechuga  Date:  7/30/2018  Contact Type:  Face to Face    SUBJECTIVE:     Patient Findings     Positives No Problem Findings, Unexplained INR or factor level change    Comments Did have burstis and has been using tylenol. Bursitis resolved           OBJECTIVE    INR Protime   Date Value Ref Range Status   07/30/2018 3.7 (A) 0.86 - 1.14 Final       ASSESSMENT / PLAN  INR assessment SUPRA    Recheck INR In: 2 WEEKS    INR Location Clinic      Anticoagulation Summary as of 7/30/2018     INR goal 2.0-3.0   Today's INR 3.7!   Warfarin maintenance plan 7.5 mg (5 mg x 1.5) on Mon; 5 mg (5 mg x 1) all other days   Full warfarin instructions 7/31: Hold; Otherwise 7.5 mg on Mon; 5 mg all other days   Weekly warfarin total 37.5 mg   Plan last modified Ariana Arriaga (3/29/2016)   Next INR check 8/15/2018   Target end date     Indications   Long-term (current) use of anticoagulants [Z79.01] [Z79.01]  Chronic atrial fibrillation (H) [I48.2]         Anticoagulation Episode Summary     INR check location     Preferred lab     Send INR reminders to [facility]    Comments       Anticoagulation Care Providers     Provider Role Specialty Phone number    Narcisa Mcmahon MD [facility] [phone]            See the Encounter Report to view Anticoagulation Flowsheet and Dosing Calendar (Go to Encounters tab in chart review, and find the Anticoagulation Therapy Visit)    Took today's dose already so he will hold tomorrow's dose.    AUDELIA DONALD

## 2018-08-17 ENCOUNTER — ANTICOAGULATION THERAPY VISIT (OUTPATIENT)
Dept: NURSING | Facility: CLINIC | Age: 78
End: 2018-08-17
Payer: MEDICARE

## 2018-08-17 DIAGNOSIS — I48.20 CHRONIC ATRIAL FIBRILLATION (H): ICD-10-CM

## 2018-08-17 DIAGNOSIS — Z79.01 LONG-TERM (CURRENT) USE OF ANTICOAGULANTS: ICD-10-CM

## 2018-08-17 LAB — INR POINT OF CARE: 2.3 (ref 0.86–1.14)

## 2018-08-17 PROCEDURE — 85610 PROTHROMBIN TIME: CPT | Mod: QW

## 2018-08-17 PROCEDURE — 99207 ZZC NO CHARGE NURSE ONLY: CPT

## 2018-08-17 PROCEDURE — 36416 COLLJ CAPILLARY BLOOD SPEC: CPT

## 2018-08-17 NOTE — MR AVS SNAPSHOT
William ALEXANDER Angelaalonzo   8/17/2018 8:15 AM   Anticoagulation Therapy Visit    Description:  77 year old male   Provider:  AN ANTI COAG   Department:  An Nurse           INR as of 8/17/2018     Today's INR 2.3      Anticoagulation Summary as of 8/17/2018     INR goal 2.0-3.0   Today's INR 2.3   Full warfarin instructions 7.5 mg on Mon; 5 mg all other days   Next INR check 10/1/2018    Indications   Long-term (current) use of anticoagulants [Z79.01] [Z79.01]  Chronic atrial fibrillation (H) [I48.2]         Your next Anticoagulation Clinic appointment(s)     Oct 01, 2018  8:30 AM CDT   Anticoagulation Visit with ASHLEY ANTI COADADA   [facility] [place] ([facility]    [address]  [place] [address] [phone]              Contact Numbers     [facility]  Please call  [phone] to cancel and/or reschedule your appointment, or with any problems or questions regarding your therapy.        August 2018 Details    Sun Mon Tue Wed Thu Fri Sat        1               2               3               4                 5               6               7               8               9               10               11                 12               13               14               15               16               17      5 mg   See details      18      5 mg           19      5 mg         20      7.5 mg         21      5 mg         22      5 mg         23      5 mg         24      5 mg         25      5 mg           26      5 mg         27      7.5 mg         28      5 mg         29      5 mg         30      5 mg         31      5 mg           Date Details   08/17 This INR check               How to take your warfarin dose     To take:  5 mg Take 1 of the 5 mg tablets.    To take:  7.5 mg Take 1.5 of the 5 mg tablets.           September 2018 Details    Sun Mon Tue Wed Thu Fri Sat           1      5 mg           2      5 mg         3      7.5 mg         4      5 mg         5      5 mg         6       5 mg         7      5 mg         8      5 mg           9      5 mg         10      7.5 mg         11      5 mg         12      5 mg         13      5 mg         14      5 mg         15      5 mg           16      5 mg         17      7.5 mg         18      5 mg         19      5 mg         20      5 mg         21      5 mg         22      5 mg           23      5 mg         24      7.5 mg         25      5 mg         26      5 mg         27      5 mg         28      5 mg         29      5 mg           30      5 mg                Date Details   No additional details            How to take your warfarin dose     To take:  5 mg Take 1 of the 5 mg tablets.    To take:  7.5 mg Take 1.5 of the 5 mg tablets.           October 2018 Details    Sun Mon Tue Wed Thu Fri Sat      1            2               3               4               5               6                 7               8               9               10               11               12               13                 14               15               16               17               18               19               20                 21               22               23               24               25               26               27                 28               29               30               31                   Date Details   No additional details    Date of next INR:  10/1/2018         How to take your warfarin dose     To take:  7.5 mg Take 1.5 of the 5 mg tablets.

## 2018-10-01 ENCOUNTER — ANTICOAGULATION THERAPY VISIT (OUTPATIENT)
Dept: NURSING | Facility: CLINIC | Age: 78
End: 2018-10-01
Payer: MEDICARE

## 2018-10-01 DIAGNOSIS — Z79.01 LONG-TERM (CURRENT) USE OF ANTICOAGULANTS: ICD-10-CM

## 2018-10-01 DIAGNOSIS — I48.20 CHRONIC ATRIAL FIBRILLATION (H): ICD-10-CM

## 2018-10-01 LAB — INR POINT OF CARE: 2.1 (ref 0.86–1.14)

## 2018-10-01 PROCEDURE — 99207 ZZC NO CHARGE NURSE ONLY: CPT

## 2018-10-01 PROCEDURE — 85610 PROTHROMBIN TIME: CPT | Mod: QW

## 2018-10-01 PROCEDURE — 36416 COLLJ CAPILLARY BLOOD SPEC: CPT

## 2018-10-01 NOTE — MR AVS SNAPSHOT
William ALEAXNDER Alexandrea   10/1/2018 8:30 AM   Anticoagulation Therapy Visit    Description:  77 year old male   Provider:  ASHLEY ANTI COAG   Department:  Be Nurse           INR as of 10/1/2018     Today's INR 2.1      Anticoagulation Summary as of 10/1/2018     INR goal 2.0-3.0   Today's INR 2.1   Full warfarin instructions 7.5 mg on Mon; 5 mg all other days   Next INR check 11/15/2018    Indications   Long-term (current) use of anticoagulants [Z79.01] [Z79.01]  Chronic atrial fibrillation (H) [I48.2]         Your next Anticoagulation Clinic appointment(s)     Nov 15, 2018  8:30 AM CST   Anticoagulation Visit with BE ANTI COAG   [facility] [place] ([facility] [place])    [address]  [place] [address] [phone]              Contact Numbers     [facility]  Please call [phone] with any problems or questions regarding your therapy, or to cancel or reschedule your appointment.          October 2018 Details    Sun Mon Tue Wed Thu Fri Sat      1      7.5 mg   See details      2      5 mg         3      5 mg         4      5 mg         5      5 mg         6      5 mg           7      5 mg         8      7.5 mg         9      5 mg         10      5 mg         11      5 mg         12      5 mg         13      5 mg           14      5 mg         15      7.5 mg         16      5 mg         17      5 mg         18      5 mg         19      5 mg         20      5 mg           21      5 mg         22      7.5 mg         23      5 mg         24      5 mg         25      5 mg         26      5 mg         27      5 mg           28      5 mg         29      7.5 mg         30      5 mg         31      5 mg             Date Details   10/01 This INR check               How to take your warfarin dose     To take:  5 mg Take 1 of the 5 mg tablets.    To take:  7.5 mg Take 1.5 of the 5 mg tablets.           November 2018 Details    Sun Mon Tue Wed Thu Fri Sat         1      5 mg         2      5 mg          3      5 mg           4      5 mg         5      7.5 mg         6      5 mg         7      5 mg         8      5 mg         9      5 mg         10      5 mg           11      5 mg         12      7.5 mg         13      5 mg         14      5 mg         15            16               17                 18               19               20               21               22               23               24                 25               26               27               28               29               30                 Date Details   No additional details    Date of next INR:  11/15/2018         How to take your warfarin dose     To take:  5 mg Take 1 of the 5 mg tablets.    To take:  7.5 mg Take 1.5 of the 5 mg tablets.

## 2018-10-01 NOTE — PROGRESS NOTES
ANTICOAGULATION FOLLOW-UP CLINIC VISIT    Patient Name:  William Lechuga  Date:  10/1/2018  Contact Type:  Face to Face    SUBJECTIVE:     Patient Findings     Positives No Problem Findings           OBJECTIVE    INR Protime   Date Value Ref Range Status   10/01/2018 2.1 (A) 0.86 - 1.14 Final       ASSESSMENT / PLAN  INR assessment THER    Recheck INR In: 6 WEEKS    INR Location Clinic      Anticoagulation Summary as of 10/1/2018     INR goal 2.0-3.0   Today's INR 2.1   Warfarin maintenance plan 7.5 mg (5 mg x 1.5) on Mon; 5 mg (5 mg x 1) all other days   Full warfarin instructions 7.5 mg on Mon; 5 mg all other days   Weekly warfarin total 37.5 mg   No change documented Arminda Liu   Plan last modified Ariana Arriaga (3/29/2016)   Next INR check 11/15/2018   Target end date     Indications   Long-term (current) use of anticoagulants [Z79.01] [Z79.01]  Chronic atrial fibrillation (H) [I48.2]         Anticoagulation Episode Summary     INR check location     Preferred lab     Send INR reminders to [facility] ANTICOAG CLINIC    Comments       Anticoagulation Care Providers     Provider Role Specialty Phone number    Narcisa Mcmahon MD [facility] [phone]            See the Encounter Report to view Anticoagulation Flowsheet and Dosing Calendar (Go to Encounters tab in chart review, and find the Anticoagulation Therapy Visit)        ARMINDA LIU

## 2018-10-02 ENCOUNTER — OFFICE VISIT (OUTPATIENT)
Dept: INTERNAL MEDICINE | Facility: CLINIC | Age: 78
End: 2018-10-02
Payer: MEDICARE

## 2018-10-02 VITALS
TEMPERATURE: 96.8 F | WEIGHT: 229 LBS | SYSTOLIC BLOOD PRESSURE: 151 MMHG | OXYGEN SATURATION: 98 % | HEART RATE: 67 BPM | DIASTOLIC BLOOD PRESSURE: 92 MMHG | RESPIRATION RATE: 16 BRPM | BODY MASS INDEX: 31.06 KG/M2

## 2018-10-02 DIAGNOSIS — C73 MALIGNANT NEOPLASM OF THYROID GLAND (H): ICD-10-CM

## 2018-10-02 DIAGNOSIS — I48.20 CHRONIC ATRIAL FIBRILLATION (H): ICD-10-CM

## 2018-10-02 DIAGNOSIS — M54.2 CERVICALGIA: ICD-10-CM

## 2018-10-02 DIAGNOSIS — I10 BENIGN ESSENTIAL HYPERTENSION: Primary | ICD-10-CM

## 2018-10-02 PROCEDURE — 99215 OFFICE O/P EST HI 40 MIN: CPT | Performed by: INTERNAL MEDICINE

## 2018-10-02 RX ORDER — LISINOPRIL 40 MG/1
40 TABLET ORAL DAILY
Qty: 90 TABLET | Refills: 3 | Status: SHIPPED | OUTPATIENT
Start: 2018-10-02 | End: 2023-03-06

## 2018-10-02 RX ORDER — LISINOPRIL 20 MG/1
TABLET ORAL
COMMUNITY
Start: 2018-07-28 | End: 2018-10-02

## 2018-10-02 RX ORDER — IPRATROPIUM BROMIDE 42 UG/1
SPRAY, METERED NASAL
COMMUNITY
Start: 2018-09-05 | End: 2022-12-07

## 2018-10-02 NOTE — PROGRESS NOTES
SUBJECTIVE:   William Lechuga is a 78 year old male who presents to clinic today for the following health issues:    New Patient/Transfer of Care  Hypertension Follow-up      Outpatient blood pressures are being checked at home.  Results are 140-150/ 70-80.    Low Salt Diet: no added salt      Amount of exercise or physical activity: 4-5 days/week for an average of greater than 60 minutes    Problems taking medications regularly: No    Medication side effects: none    Diet: regular (no restrictions)    Atrial fibrillation on warfarin anticoagulation -- has questions about NOACs.    Last free T4 1.8 5/2018.    1. Benign essential hypertension    2. Malignant neoplasm of thyroid gland (H)        PMH: Updated and/or reviewed in chart.    PSH: Updated and/or reviewed in chart.    Family History: Updated and/or reviewed in chart.     ROS:  Constitutional, HEENT, cardiovascular, pulmonary, gi and gu systems are otherwise negative.    OBJECTIVE:                                                    BP (!) 151/92  Pulse 67  Temp 96.8  F (36  C) (Tympanic)  Resp 16  Wt 229 lb (103.9 kg)  SpO2 98%  BMI 31.06 kg/m2    GEN: No acute distress  EYES: No conjunctival injection or icterus, EOMI grossly intact  RESP: Unlabored, regular  NEURO: Normal gait, MAEx4, light touch sensation grossly intact  PSYCH: Normal mood and affect  MUSCULOSKELETAL: very limited active range of motion of cervical spine    Results for orders placed or performed in visit on 10/01/18   INR point of care   Result Value Ref Range    INR Protime 2.1 (A) 0.86 - 1.14      ASSESSMENT/PLAN:                                                    1. Malignant neoplasm of thyroid gland (H)  s/p surgery 2005 -- recheck thyroid function now -- clinically euthyroid but chemically higher  - T4 free; Future  - TSH; Future    2. Benign essential hypertension  Increase from 20 to 40 mg.  Tolerating beta-blocker and hydrochlorothiazide well.  Consider amlodipine next.   Follow-up 2 weeks for BP visit.  - lisinopril (PRINIVIL/ZESTRIL) 40 MG tablet; Take 1 tablet (40 mg) by mouth daily  Dispense: 90 tablet; Refill: 3  - **Basic metabolic panel FUTURE 14d; Future    3. Cervicalgia - physical therapy     4. Atrial fibrillation -- consider NOAC over warfarin anticoagulation if medication interactions in future    Orders Placed This Encounter     **Basic metabolic panel FUTURE 14d     T4 free     TSH     DISCONTD: lisinopril (PRINIVIL/ZESTRIL) 20 MG tablet     ipratropium (ATROVENT) 0.06 % spray     lisinopril (PRINIVIL/ZESTRIL) 40 MG tablet      I spent a total of 45 minutes with the patient of which greater than 50% were devoted to counseling and coordination of care: hypertension, atrial fibrillation, thyroid function, cervicalgia.          Prakash Gonzalez MD

## 2018-10-03 ENCOUNTER — ALLIED HEALTH/NURSE VISIT (OUTPATIENT)
Dept: NURSING | Facility: CLINIC | Age: 78
End: 2018-10-03
Payer: MEDICARE

## 2018-10-03 DIAGNOSIS — Z23 NEED FOR VACCINATION: Primary | ICD-10-CM

## 2018-10-03 PROCEDURE — G0008 ADMIN INFLUENZA VIRUS VAC: HCPCS

## 2018-10-03 PROCEDURE — 90750 HZV VACC RECOMBINANT IM: CPT

## 2018-10-03 NOTE — MR AVS SNAPSHOT
After Visit Summary   10/3/2018    William Lechuga    MRN: [id number]           Patient Information     Date Of Birth          1940        Visit Information        Provider Department      10/3/2018 10:45 AM BE ANCILLARY [facility] [place]        Today's Diagnoses     Need for vaccination    -  1       Follow-ups after your visit        Your next 10 appointments already scheduled     Oct 03, 2018 10:45 AM CDT   Nurse Only with BE ANCILLARY   [facility] [place] [facility] [place]    [address]  [place] [address]   [phone]            Oct 19, 2018  9:00 AM CDT   LAB with BE LAB   [facility] [place] [facility] [place]    [address]  [place] [address]   [phone]           Please do not eat 10-12 hours before your appointment if you are coming in fasting for labs on lipids, cholesterol, or glucose (sugar). This does not apply to pregnant women. Water, hot tea and black coffee (with nothing added) are okay. Do not drink other fluids, diet soda or chew gum.            Oct 19, 2018  9:15 AM CDT   Nurse Only with BE ANCILLARY   [facility] [place] ([facility] [place]    [address]  [place] [address]   [phone]            Oct 19, 2018  9:40 AM CDT   (Arrive by 9:25 AM)   [facility] with Cipriano Jain, PT   [facility] [place] [facility] ([facility] [place]    [address]  [place] [address]   [phone]            Oct 25, 2018  8:40 AM CDT   [facility] Spine with Cipriano Jain, PT   [facility] [place] [facility] ([facility] [place])    [address]  [place] [address]   [phone]            Nov 01, 2018  8:40 AM CDT   [facility] Spine with Cipriano Jain, PT   [facility] [place] [facility] ([facility] [place]    [address]  [place] [address]   [phone]            Nov 15, 2018  8:30  AM CST   Anticoagulation Visit with BE ANTI COAG   [facility] ([facility] Jeremi)    [address] [place] [address] [phone]              Future tests that were ordered for you today     Open Future Orders        Priority Expected Expires Ordered    PHYSICAL THERAPY REFERRAL Routine  10/2/2019 10/2/2018    T4 free Routine  10/2/2019 10/2/2018    TSH Routine  10/2/2019 10/2/2018    **Basic metabolic panel FUTURE 14d Routine 10/9/2018 10/16/2018 10/2/2018            Who to contact     If you have questions or need follow up information about today's clinic visit or your schedule please [facility] at [phone].  Normal or non-critical lab and imaging results will be communicated to you by MyChart, letter or phone within 4 business days after the clinic has received the results. If you do not hear from us within 7 days, please contact the clinic through MyChart or phone. If you have a critical or abnormal lab result, we will notify you by phone as soon as possible.  Submit refill requests through [employer] or call your pharmacy and they will forward the refill request to us. Please allow 3 business days for your refill to be completed.          Additional Information About Your Visit        Care EveryWhere ID     This is your Care EveryWhere ID. This could be used by other organizations to access your [place] medical records  [id number]         Blood Pressure from Last 3 Encounters:   10/02/18 (!) 151/92   01/08/18 150/76   01/06/17 133/82    Weight from Last 3 Encounters:   10/02/18 229 lb (103.9 kg)   01/08/18 238 lb (108 kg)   01/06/17 228 lb (103.4 kg)              We Performed the Following     VACCINE ADMINISTRATION, INITIAL     ZOSTER VACCINE RECOMBINANT ADJUVANTED IM NJX        Primary Care Provider Office Phone # Fax #    Narcisa Mcmahon [phone] [address] [address]         Equal Access to Services     [facility]: Hadii jaylin mora ana Warren, wamaria eda luqadaha, qaybta kafishmaureen xiaorosecandy head. So [facility] [phone].    ATENCIÓN: Si habla español, tiene a jaramillo disposición servicios gratuitos de asistencia lingüística. Fiorellaame al [phone].    We comply with applicable federal civil rights laws and [place] laws. We do not discriminate on the basis of race, color, national origin, age, disability, sex, sexual orientation, or gender identity.            Thank you!     Thank you for choosing [facility]  for your care. Our goal is always to provide you with excellent care. Hearing back from our patients is one way we can continue to improve our services. Please take a few minutes to complete the written survey that you may receive in the mail after your visit with us. Thank you!             Your Updated Medication List - Protect others around you: Learn how to safely use, store and throw away your medicines at www.disposemymeds.org.          This list is accurate as of 10/3/18 10:36 AM.  Always use your most recent med list.                   Brand Name Dispense Instructions for use Diagnosis    hydrochlorothiazide 25 MG tablet    HYDRODIURIL     1 TABLET EVERY MORNING        ipratropium 0.06 % spray    ATROVENT          levothyroxine 137 MCG tablet    SYNTHROID/LEVOTHROID     137 mcg daily        lisinopril 40 MG tablet    PRINIVIL/ZESTRIL    90 tablet    Take 1 tablet (40 mg) by mouth daily    Benign essential hypertension       metoprolol succinate 100 MG 24 hr tablet    TOPROL-XL     100 mg daily        order for DME     1 each    please draw INR the week of February 19-23. Please fax results to [phone]. [facility].  phone number [phone]    Long-term (current) use of anticoagulants, Chronic atrial fibrillation (H)       simvastatin 40 MG tablet    ZOCOR     40 mg daily        VITAMIN D3 PO      Take 500 Units by  mouth daily        warfarin 5 MG tablet    COUMADIN     Take 1.5 tablets Mondays and Thursdays and 1 tablet all other days of the week or as directed    Chronic atrial fibrillation (H)

## 2018-10-19 ENCOUNTER — THERAPY VISIT (OUTPATIENT)
Dept: PHYSICAL THERAPY | Facility: CLINIC | Age: 78
End: 2018-10-19
Attending: INTERNAL MEDICINE
Payer: MEDICARE

## 2018-10-19 ENCOUNTER — ALLIED HEALTH/NURSE VISIT (OUTPATIENT)
Dept: NURSING | Facility: CLINIC | Age: 78
End: 2018-10-19
Payer: MEDICARE

## 2018-10-19 VITALS — DIASTOLIC BLOOD PRESSURE: 84 MMHG | HEART RATE: 60 BPM | SYSTOLIC BLOOD PRESSURE: 144 MMHG

## 2018-10-19 DIAGNOSIS — I10 ESSENTIAL HYPERTENSION: Primary | Chronic | ICD-10-CM

## 2018-10-19 DIAGNOSIS — M54.2 CERVICALGIA: ICD-10-CM

## 2018-10-19 DIAGNOSIS — C73 MALIGNANT NEOPLASM OF THYROID GLAND (H): ICD-10-CM

## 2018-10-19 DIAGNOSIS — I10 BENIGN ESSENTIAL HYPERTENSION: ICD-10-CM

## 2018-10-19 LAB
ANION GAP SERPL CALCULATED.3IONS-SCNC: 5 MMOL/L (ref 3–14)
BUN SERPL-MCNC: 17 MG/DL (ref 7–30)
CALCIUM SERPL-MCNC: 8.9 MG/DL (ref 8.5–10.1)
CHLORIDE SERPL-SCNC: 97 MMOL/L (ref 94–109)
CO2 SERPL-SCNC: 30 MMOL/L (ref 20–32)
CREAT SERPL-MCNC: 0.99 MG/DL (ref 0.66–1.25)
GFR SERPL CREATININE-BSD FRML MDRD: 73 ML/MIN/1.7M2
GLUCOSE SERPL-MCNC: 117 MG/DL (ref 70–99)
POTASSIUM SERPL-SCNC: 4.8 MMOL/L (ref 3.4–5.3)
SODIUM SERPL-SCNC: 132 MMOL/L (ref 133–144)
T4 FREE SERPL-MCNC: 1.33 NG/DL (ref 0.76–1.46)
TSH SERPL DL<=0.005 MIU/L-ACNC: 0.91 MU/L (ref 0.4–4)

## 2018-10-19 PROCEDURE — G8982 BODY POS GOAL STATUS: HCPCS | Mod: GP | Performed by: PHYSICAL THERAPIST

## 2018-10-19 PROCEDURE — 84443 ASSAY THYROID STIM HORMONE: CPT | Performed by: INTERNAL MEDICINE

## 2018-10-19 PROCEDURE — 97110 THERAPEUTIC EXERCISES: CPT | Mod: GP | Performed by: PHYSICAL THERAPIST

## 2018-10-19 PROCEDURE — 97161 PT EVAL LOW COMPLEX 20 MIN: CPT | Mod: GP | Performed by: PHYSICAL THERAPIST

## 2018-10-19 PROCEDURE — G8981 BODY POS CURRENT STATUS: HCPCS | Mod: GP | Performed by: PHYSICAL THERAPIST

## 2018-10-19 PROCEDURE — 36415 COLL VENOUS BLD VENIPUNCTURE: CPT | Performed by: INTERNAL MEDICINE

## 2018-10-19 PROCEDURE — 99207 ZZC NO CHARGE NURSE ONLY: CPT

## 2018-10-19 PROCEDURE — 84439 ASSAY OF FREE THYROXINE: CPT | Performed by: INTERNAL MEDICINE

## 2018-10-19 PROCEDURE — 80048 BASIC METABOLIC PNL TOTAL CA: CPT | Performed by: INTERNAL MEDICINE

## 2018-10-19 NOTE — MR AVS SNAPSHOT
After Visit Summary   10/19/2018    William Lechuga    MRN: [id number]           Patient Information     Date Of Birth          1940        Visit Information        Provider Department      10/19/2018 9:15 AM BE ANCILLARY [facility] [place]        Today's Diagnoses     Essential hypertension    -  1       Follow-ups after your visit        Your next 10 appointments already scheduled     Nov 01, 2018  8:40 AM CDT   [facility] Spine with Cipriano Jain PT   [facility] [place] [facility] ([facility] [place])    [address]  [place] [address]   [phone]            Nov 15, 2018  8:30 AM CST   Anticoagulation Visit with BE ANTI COAG   [facility] [place] ([facility] [place])    [address]  [place] [address] [address] [phone]              Who to contact     If you have questions or need follow up information about today's clinic visit or your schedule please contact [facility] [place] directly at [phone].  Normal or non-critical lab and imaging results will be communicated to you by MyChart, letter or phone within 4 business days after the clinic has received the results. If you do not hear from us within 7 days, please contact the clinic through MyChart or phone. If you have a critical or abnormal lab result, we will notify you by phone as soon as possible.  Submit refill requests through [employer] or call your pharmacy and they will forward the refill request to us. Please allow 3 business days for your refill to be completed.          Additional Information About Your Visit        Care EveryWhere ID     This is your Care EveryWhere ID. This could be used by other organizations to access your [facility] medical records  [id number]        Your Vitals Were     Pulse                   60            Blood Pressure from Last 3 Encounters:   10/19/18 144/84   10/02/18 (!) 151/92   01/08/18 150/76    Weight from Last 3 Encounters:    10/02/18 229 lb (103.9 kg)   01/08/18 238 lb (108 kg)   01/06/17 228 lb (103.4 kg)              Today, you had the following     No orders found for display       Primary Care Provider Office Phone # Fax #    Narcisa Mcmahon [phone] [address] [address]        Equal Access to Services     NUSRAT CALIX : Hadii aad ku hadasho Soomaali, waaxda luqadaha, qaybta kaalmada adeegyada, waxay idiin hayaan adeeg connerdulce lamalik . So [facility] [phone].    ATENCIÓN: Si mellissa faye, tiene a jaramillo disposición servicios gratuitos de asistencia lingüística. Echo al [phone].    We comply with applicable federal civil rights laws and [place] laws. We do not discriminate on the basis of race, color, national origin, age, disability, sex, sexual orientation, or gender identity.            Thank you!     Thank you for choosing [facility]  for your care. Our goal is always to provide you with excellent care. Hearing back from our patients is one way we can continue to improve our services. Please take a few minutes to complete the written survey that you may receive in the mail after your visit with us. Thank you!             Your Updated Medication List - Protect others around you: Learn how to safely use, store and throw away your medicines at www.disposemymeds.org.          This list is accurate as of 10/19/18  9:46 AM.  Always use your most recent med list.                   Brand Name Dispense Instructions for use Diagnosis    hydrochlorothiazide 25 MG tablet    HYDRODIURIL     1 TABLET EVERY MORNING        ipratropium 0.06 % spray    ATROVENT          levothyroxine 137 MCG tablet    SYNTHROID/LEVOTHROID     137 mcg daily        lisinopril 40 MG tablet    PRINIVIL/ZESTRIL    90 tablet    Take 1 tablet (40 mg) by mouth daily    Benign essential hypertension       metoprolol succinate 100 MG 24 hr tablet    TOPROL-XL     100 mg daily        order for DME      1 each    please draw INR the week of February 19-23. Please fax results to [phone]. [facility] [place] [facility].  phone number [phone]    Long-term (current) use of anticoagulants, Chronic atrial fibrillation (H)       simvastatin 40 MG tablet    ZOCOR     40 mg daily        VITAMIN D3 PO      Take 500 Units by mouth daily        warfarin 5 MG tablet    COUMADIN     Take 1.5 tablets Mondays and Thursdays and 1 tablet all other days of the week or as directed    Chronic atrial fibrillation (H)

## 2018-10-19 NOTE — LETTER
"DEPARTMENT OF HEALTH AND HUMAN SERVICES  CENTERS FOR MEDICARE & MEDICAID SERVICES    PLAN/UPDATED PLAN OF PROGRESS FOR OUTPATIENT REHABILITATION    PATIENTS NAME:  William Lechuga   : 1940  PROVIDER NUMBER:    [id number]  [id number]  PROVID[facility]: [facility]  MEDICAL RECORD NUMBER: [id number]   START OF CARE DATE:  SOC Date: 10/19/18   TYPE:  PT  PRIMARY/TREATMENT DIAGNOSIS: (Pertinent Medical Diagnosis)  Cervicalgia  VISITS FROM START OF CARE:  Rxs Used: 1     [facility] Initial Evaluation  Subjective:  Patient is a 78 year old male presenting with rehab cervical spine hpi. The history is provided by the patient. No  was used. William Lechuga is a 78 year old male with a cervical spine condition.  Condition occurred with:  Degenerative joint disease.  Condition occurred: for unknown reasons.  This is a chronic condition  Years of neck pain and stiffness related to DJD that is worsening.  Pt was referred to PT on 10/2/18.  Patient reports pain:  Cervical right side.  Quality: \"snapping\" \"tightness\" and is intermittent and reported as 8/10.  Associated symptoms:  Loss of motion/stiffness and loss of strength. Pain is the same all the time.  Symptoms are exacerbated by certain positions and rotating head and relieved by rest.  Since onset symptoms are gradually worsening.  Special tests:  X-ray (pt reports he was told multi-level degenerative changes).  General health as reported by patient is good.  Pertinent medical history includes:  Sleep disorder/apnea, thyroid problems, cancer, heart problems, high blood pressure and osteoarthritis.  Medical allergies: no.  Other surgeries include:  Cancer surgery and orthopedic surgery.  Current medications:  Cardiac medication, high blood pressure medication and heparin/coumadin.  Current occupation is retired.      Barriers include:  None as reported by the patient.  Red flags:  None as reported " by the patient.              Objective:  Standing Alignment:    Cervical/Thoracic:  Cervical lordosis decreased  Shoulder/UE:  Rounded shoulders     Cervical/Thoracic Evaluation  Arom wnl cervical: retraction: major loss (increased mechanical response with repeated)   AROM:  AROM Cervical:  Flexion:            Mild loss (-)  Extension:       Major loss (-)  Rotation:         Left: major loss (audible clunk)     Right: major loss but slightly more than L  Side Bend:      Left: major loss (+)     Right:  Major loss (+)    Cervical Myotomes:  normal  Cervical Dermatomes:  normal    PATIENTS NAME:  William Lechuga   : 1940  Cervical Palpation:    Tenderness present at Left:    Upper Trap; Erector Spinae and Suboccipitals  Tenderness present at Right:    Upper Trap; Erector Spinae and Suboccipitals  Spinal Segmental Conclusions:    Level:  Hypo at C2, C3, C4, C5, C6, C7 and T1  Assessment/Plan:    Patient is a 78 year old male with cervical complaints.    Patient has the following significant findings with corresponding treatment plan.                Diagnosis 1:  Neck pain/DJD  Pain -  self management, education and home program  Decreased ROM/flexibility - manual therapy, therapeutic exercise and home program  Decreased joint mobility - manual therapy, therapeutic exercise and home program  Decreased function - therapeutic activities and home program  Impaired posture - neuro re-education and home program  Therapy Evaluation Codes:   1) History comprised of:   Personal factors that impact the plan of care:      Age and Time since onset of symptoms.    Comorbidity factors that impact the plan of care are:      Osteoarthritis.     Medications impacting care: None.  2) Examination of Body Systems comprised of:   Body structures and functions that impact the plan of care:      Cervical spine.   Activity limitations that impact the plan of care are:      Driving, Dressing and Lifting.  3) Clinical presentation  "characteristics are:   Stable/Uncomplicated.  4) Decision-Making    Low complexity using standardized patient assessment instrument and/or measureable assessment of functional outcome.  Cumulative Therapy Evaluation is: Low complexity.  Previous and current functional limitations:  (See Goal Flow Sheet for this information)    Short term and Long term goals: (See Goal Flow Sheet for this information)   Communication ability:  Patient appears to be able to clearly communicate and understand verbal and written communication and follow directions correctly.  Treatment Explanation - The following has been discussed with the patient:   RX ordered/plan of care  Anticipated outcomes  Possible risks and side effects  This patient would benefit from PT intervention to resume normal activities.   Rehab potential is good.  Frequency:  2 X a month, once daily  Duration:  for 2 months  Discharge Plan:  Achieve all LTG.  Independent in home treatment program.  Reach maximal therapeutic benefit.  Please refer to the daily flowsheet for treatment today, total treatment time and time spent performing 1:1 timed codes.         PATIENTS NAME:  William Lechuga   : 1940              Caregiver Signature/Credentials _____________________________ Date ________       Treating Provider: Cipriano Jain, PT   I have reviewed and certified the need for these services and plan of treatment while under my care.        PHYSICIAN'S SIGNATURE:   _________________________________________  Date___________   Prakash Gonzalez M.D.    Certification period:  Beginning of Cert date period: 10/19/18 to  End of Cert period date: 19     Functional Level Progress Report: Please see attached \"Goal Flow sheet for Functional level.\"    ____X____ Continue Services or       ________ DC Services                Service dates: From  SOC Date: 10/19/18 date to present                         "

## 2018-10-19 NOTE — PROGRESS NOTES
"[facility] Initial Evaluation  Subjective:  Patient is a 78 year old male presenting with rehab cervical spine hpi. The history is provided by the patient. No  was used.   William Lechuga is a 78 year old male with a cervical spine condition.  Condition occurred with:  Degenerative joint disease.  Condition occurred: for unknown reasons.  This is a chronic condition  Years of neck pain and stiffness related to DJD that is worsening.  Pt was referred to PT on 10/2/18.    Patient reports pain:  Cervical right side.    Quality: \"snapping\" \"tightness\" and is intermittent and reported as 8/10.  Associated symptoms:  Loss of motion/stiffness and loss of strength. Pain is the same all the time.  Symptoms are exacerbated by certain positions and rotating head and relieved by rest.  Since onset symptoms are gradually worsening.  Special tests:  X-ray (pt reports he was told multi-level degenerative changes).      General health as reported by patient is good.  Pertinent medical history includes:  Sleep disorder/apnea, thyroid problems, cancer, heart problems, high blood pressure and osteoarthritis.  Medical allergies: no.  Other surgeries include:  Cancer surgery and orthopedic surgery.  Current medications:  Cardiac medication, high blood pressure medication and heparin/coumadin.  Current occupation is retired.        Barriers include:  None as reported by the patient.    Red flags:  None as reported by the patient.                        Objective:  Standing Alignment:    Cervical/Thoracic:  Cervical lordosis decreased  Shoulder/UE:  Rounded shoulders                                  Cervical/Thoracic Evaluation  Arom wnl cervical: retraction: major loss (increased mechanical response with repeated)     AROM:  AROM Cervical:    Flexion:            Mild loss (-)  Extension:       Major loss (-)  Rotation:         Left: major loss (audible clunk)     Right: major loss but slightly more " than L  Side Bend:      Left: major loss (+)     Right:  Major loss (+)        Cervical Myotomes:  normal                      Cervical Dermatomes:  normal                    Cervical Palpation:    Tenderness present at Left:    Upper Trap; Erector Spinae and Suboccipitals  Tenderness present at Right:    Upper Trap; Erector Spinae and Suboccipitals      Spinal Segmental Conclusions:    Level:  Hypo at C2, C3, C4, C5, C6, C7 and T1                                                General     ROS    Assessment/Plan:    Patient is a 78 year old male with cervical complaints.    Patient has the following significant findings with corresponding treatment plan.                Diagnosis 1:  Neck pain/DJD  Pain -  self management, education and home program  Decreased ROM/flexibility - manual therapy, therapeutic exercise and home program  Decreased joint mobility - manual therapy, therapeutic exercise and home program  Decreased function - therapeutic activities and home program  Impaired posture - neuro re-education and home program    Therapy Evaluation Codes:   1) History comprised of:   Personal factors that impact the plan of care:      Age and Time since onset of symptoms.    Comorbidity factors that impact the plan of care are:      Osteoarthritis.     Medications impacting care: None.  2) Examination of Body Systems comprised of:   Body structures and functions that impact the plan of care:      Cervical spine.   Activity limitations that impact the plan of care are:      Driving, Dressing and Lifting.  3) Clinical presentation characteristics are:   Stable/Uncomplicated.  4) Decision-Making    Low complexity using standardized patient assessment instrument and/or measureable assessment of functional outcome.  Cumulative Therapy Evaluation is: Low complexity.    Previous and current functional limitations:  (See Goal Flow Sheet for this information)    Short term and Long term goals: (See Goal Flow Sheet for this  information)     Communication ability:  Patient appears to be able to clearly communicate and understand verbal and written communication and follow directions correctly.  Treatment Explanation - The following has been discussed with the patient:   RX ordered/plan of care  Anticipated outcomes  Possible risks and side effects  This patient would benefit from PT intervention to resume normal activities.   Rehab potential is good.    Frequency:  2 X a month, once daily  Duration:  for 2 months  Discharge Plan:  Achieve all LTG.  Independent in home treatment program.  Reach maximal therapeutic benefit.    Please refer to the daily flowsheet for treatment today, total treatment time and time spent performing 1:1 timed codes.

## 2018-10-19 NOTE — MR AVS SNAPSHOT
After Visit Summary   10/19/2018    William Lechuga    MRN: [id number]           Patient Information     Date Of Birth          1940        Visit Information        Provider Department      10/19/2018 9:40 AM Cipriano Jain PT [facility] [place] [facility]        Today's Diagnoses     Cervicalgia           Follow-ups after your visit        Your next 10 appointments already scheduled     Nov 01, 2018  8:40 AM CDT   [facility] Spine with Cipriano Jain PT   [facility] [place] [facility] ([facility] [place])    [address]  [address]  [place] [address] [phone]            Nov 15, 2018  8:30 AM CST   Anticoagulation Visit with BE ANTI COAG   [facility] [place] [facility] [place]    [address]  [place] [address] [phone]              Who to contact     If you have questions or need follow up information about today's clinic visit or your schedule please contact [facility] [place] [facility] directly at [phone].  Normal or non-critical lab and imaging results will be communicated to you by MyChart, letter or phone within 4 business days after the clinic has received the results. If you do not hear from us within 7 days, please contact the clinic through MyChart or phone. If you have a critical or abnormal lab result, we will notify you by phone as soon as possible.  Submit refill requests through [employer] or call your pharmacy and they will forward the refill request to us. Please allow 3 business days for your refill to be completed.          Additional Information About Your Visit        Care EveryWhere ID     This is your Care EveryWhere ID. This could be used by other organizations to access your [facility] medical records  [id number]         Blood Pressure from Last 3 Encounters:   10/19/18 144/84   10/02/18 (!) 151/92   01/08/18 150/76    Weight from Last 3 Encounters:   10/02/18 103.9 kg (229  lb)   01/08/18 108 kg (238 lb)   01/06/17 103.4 kg (228 lb)              We Performed the Following     HC PT EVAL, LOW COMPLEXITY     [facility] CERT REPORT     PHYSICAL THERAPY REFERRAL     THERAPEUTIC EXERCISES        Primary Care Provider Office Phone # Fax #    Narcisa Mcmahon [phone] [address] [address]        Equal Access to Services     [facility] Center[facility] : Hadii aad ku hadasho Soomaali, waaxda luqadaha, qaybta kaalmada adeegyada, waxay idiin hayaan adeeg khmerlinsh laDelphineaan . So [facility] [phone].    ATENCIÓN: Si mellissa faye, tiene a jaramillo disposición servicios gratuitos de asistencia lingüística. Llame al [phone].    We comply with applicable federal civil rights laws and [place] laws. We do not discriminate on the basis of race, color, national origin, age, disability, sex, sexual orientation, or gender identity.            Thank you!     Thank you for choosing [facility]  for your care. Our goal is always to provide you with excellent care. Hearing back from our patients is one way we can continue to improve our services. Please take a few minutes to complete the written survey that you may receive in the mail after your visit with us. Thank you!             Your Updated Medication List - Protect others around you: Learn how to safely use, store and throw away your medicines at www.disposemymeds.org.          This list is accurate as of 10/19/18  3:24 PM.  Always use your most recent med list.                   Brand Name Dispense Instructions for use Diagnosis    hydrochlorothiazide 25 MG tablet    HYDRODIURIL     1 TABLET EVERY MORNING        ipratropium 0.06 % spray    ATROVENT          levothyroxine 137 MCG tablet    SYNTHROID/LEVOTHROID     137 mcg daily        lisinopril 40 MG tablet    PRINIVIL/ZESTRIL    90 tablet    Take 1 tablet (40 mg) by mouth daily    Benign essential hypertension       metoprolol succinate 100  MG 24 hr tablet    TOPROL-XL     100 mg daily        order for DME     1 each    please draw INR the week of February 19-23. Please fax results to [phone]. [facility] [place] [facility].  phone number [phone]    Long-term (current) use of anticoagulants, Chronic atrial fibrillation (H)       simvastatin 40 MG tablet    ZOCOR     40 mg daily        VITAMIN D3 PO      Take 500 Units by mouth daily        warfarin 5 MG tablet    COUMADIN     Take 1.5 tablets Mondays and Thursdays and 1 tablet all other days of the week or as directed    Chronic atrial fibrillation (H)

## 2018-10-19 NOTE — PROGRESS NOTES
William Lechuga is a 78 year old patient who comes in today for a Blood Pressure check.  Initial BP:  /88  Pulse 60     Repeat of BP was 144/84.   Disposition: results routed to provider; please advise.

## 2018-10-24 ENCOUNTER — TELEPHONE (OUTPATIENT)
Dept: INTERNAL MEDICINE | Facility: CLINIC | Age: 78
End: 2018-10-24

## 2018-11-01 ENCOUNTER — THERAPY VISIT (OUTPATIENT)
Dept: PHYSICAL THERAPY | Facility: CLINIC | Age: 78
End: 2018-11-01
Payer: MEDICARE

## 2018-11-01 DIAGNOSIS — M54.2 CERVICALGIA: ICD-10-CM

## 2018-11-01 PROCEDURE — 97110 THERAPEUTIC EXERCISES: CPT | Mod: GP | Performed by: PHYSICAL THERAPIST

## 2018-11-01 PROCEDURE — 97140 MANUAL THERAPY 1/> REGIONS: CPT | Mod: GP | Performed by: PHYSICAL THERAPIST

## 2018-11-01 NOTE — PROGRESS NOTES
Subjective:  HPI                    Objective:  System    Physical Exam    General     ROS    Assessment/Plan:    SUBJECTIVE  Subjective: Exercises have been going fine.  No change in pain so far.   Current Pain level: 8/10   Changes in function:  None    Adverse reaction to treatment or activity:  None    OBJECTIVE  Objective: Continues to demonstrate significant losses in cervical A/PROM in all motions.  When OP applied retraction is improved a little.  Pt felt really comfortable with towel MWM because he moved farther with less pain     ASSESSMENT  William continues to require intervention to meet STG and LTG's: PT  No change of symptoms has been noted.  Response to therapy has shown lack of progress in  pain level  Progress made towards STG/LTG?  None    PLAN  Current treatment program is being advanced to more complex exercises.    PTA/ATC plan:  N/A    Please refer to the daily flowsheet for treatment today, total treatment time and time spent performing 1:1 timed codes.

## 2018-11-01 NOTE — MR AVS SNAPSHOT
After Visit Summary   11/1/2018    William Lechuga    MRN: [id number]           Patient Information     Date Of Birth          1940        Visit Information        Provider Department      11/1/2018 8:40 AM Cipriano Jain PT [facility] [place] [facility]        Today's Diagnoses     Cervicalgia           Follow-ups after your visit        Your next 10 appointments already scheduled     Nov 15, 2018  8:30 AM CST   Anticoagulation Visit with BE ANTI COAG   [facility] [place] ([facility] [place])    [address]  [place] [address]   [phone]            Nov 15, 2018 11:20 AM CST   [facility] Spine with [facility] [place] [facility] ([facility] [place])    [address]  [place] [address]   [phone]            Nov 29, 2018  8:40 AM CST   [facility] Spine with Cipriano Jain PT   [facility] [place] [facility] ([facility] [place])    [address]  [place] [address]   [phone]              Who to contact     If you have questions or need follow up information about today's clinic visit or your schedule please contact [facility] [place] [facility] directly at [phone].  Normal or non-critical lab and imaging results will be communicated to you by MyChart, letter or phone within 4 business days after the clinic has received the results. If you do not hear from us within 7 days, please contact the clinic through MyChart or phone. If you have a critical or abnormal lab result, we will notify you by phone as soon as possible.  Submit refill requests through [employer] or call your pharmacy and they will forward the refill request to us. Please allow 3 business days for your refill to be completed.          Additional Information About Your Visit        Care EveryWhere ID     This is your Care EveryWhere ID. This could be used by other organizations to  access your [facility] medical records  [id number]         Blood Pressure from Last 3 Encounters:   10/19/18 144/84   10/02/18 (!) 151/92   01/08/18 150/76    Weight from Last 3 Encounters:   10/02/18 103.9 kg (229 lb)   01/08/18 108 kg (238 lb)   01/06/17 103.4 kg (228 lb)              We Performed the Following     MANUAL THER TECH,1+REGIONS,EA 15 MIN     THERAPEUTIC EXERCISES        Primary Care Provider Office Phone # Fax #    Narcisa Mcmahon [phone] [address] [address]        Equal Access to Services     NUSRAT CALIX : Hadii jaylin donohueo Sofausto, waaxda luqadaha, qaybta kaalmada adeduke, candy soto . So [facility] [phone].    ATENCIÓN: Si habla español, tiene a jaramillo disposición servicios gratuitos de asistencia lingüística. Llame al [phone].    We comply with applicable federal civil rights laws and [place] laws. We do not discriminate on the basis of race, color, national origin, age, disability, sex, sexual orientation, or gender identity.            Thank you!     Thank you for choosing [facility]   for your care. Our goal is always to provide you with excellent care. Hearing back from our patients is one way we can continue to improve our services. Please take a few minutes to complete the written survey that you may receive in the mail after your visit with us. Thank you!             Your Updated Medication List - Protect others around you: Learn how to safely use, store and throw away your medicines at www.disposemymeds.org.          This list is accurate as of 11/1/18 10:04 AM.  Always use your most recent med list.                   Brand Name Dispense Instructions for use Diagnosis    hydrochlorothiazide 25 MG tablet    HYDRODIURIL     1 TABLET EVERY MORNING        ipratropium 0.06 % spray    ATROVENT          levothyroxine 137 MCG tablet    SYNTHROID/LEVOTHROID     137 mcg  daily        lisinopril 40 MG tablet    PRINIVIL/ZESTRIL    90 tablet    Take 1 tablet (40 mg) by mouth daily    Benign essential hypertension       metoprolol succinate 100 MG 24 hr tablet    TOPROL-XL     100 mg daily        order for DME     1 each    please draw INR the week of February 19-23. Please fax results to [phone]. [facility] [place] [facility].  phone number [phone]    Long-term (current) use of anticoagulants, Chronic atrial fibrillation (H)       simvastatin 40 MG tablet    ZOCOR     40 mg daily        VITAMIN D3 PO      Take 500 Units by mouth daily        warfarin 5 MG tablet    COUMADIN     Take 1.5 tablets Mondays and Thursdays and 1 tablet all other days of the week or as directed    Chronic atrial fibrillation (H)

## 2018-11-15 ENCOUNTER — THERAPY VISIT (OUTPATIENT)
Dept: PHYSICAL THERAPY | Facility: CLINIC | Age: 78
End: 2018-11-15
Payer: MEDICARE

## 2018-11-15 ENCOUNTER — ANTICOAGULATION THERAPY VISIT (OUTPATIENT)
Dept: NURSING | Facility: CLINIC | Age: 78
End: 2018-11-15
Payer: MEDICARE

## 2018-11-15 DIAGNOSIS — I48.20 CHRONIC ATRIAL FIBRILLATION (H): ICD-10-CM

## 2018-11-15 DIAGNOSIS — M54.2 CERVICALGIA: ICD-10-CM

## 2018-11-15 LAB — INR POINT OF CARE: 2.6 (ref 0.86–1.14)

## 2018-11-15 PROCEDURE — 36416 COLLJ CAPILLARY BLOOD SPEC: CPT

## 2018-11-15 PROCEDURE — 97110 THERAPEUTIC EXERCISES: CPT | Mod: GP | Performed by: PHYSICAL THERAPIST

## 2018-11-15 PROCEDURE — 97140 MANUAL THERAPY 1/> REGIONS: CPT | Mod: GP | Performed by: PHYSICAL THERAPIST

## 2018-11-15 PROCEDURE — 85610 PROTHROMBIN TIME: CPT | Mod: QW

## 2018-11-15 PROCEDURE — 99207 ZZC NO CHARGE NURSE ONLY: CPT

## 2018-11-15 NOTE — MR AVS SNAPSHOT
After Visit Summary   11/15/2018    William Lechuga    MRN: [id number]           Patient Information     Date Of Birth          1940        Visit Information        Provider Department      11/15/2018 12:00 PM Cipriano Jain PT [facility] [place] [facility]        Today's Diagnoses     Cervicalgia           Follow-ups after your visit        Your next 10 appointments already scheduled     Nov 29, 2018  8:40 AM CST   [facility] with Cipriano Jain PT   [facility] [place] [facility] ([facility] [place])    [address]  [address]  [place] [address]   [phone]            Dec 27, 2018  8:30 AM CST   Anticoagulation Visit with [facility]   [facility] [place] ([facility] [place]    [address]  [place] [id number] [phone]              Who to contact     If you have questions or need follow up information about today's clinic visit or your schedule please contact [facility] [place] [facility] directly at [phone].  Normal or non-critical lab and imaging results will be communicated to you by MyChart, letter or phone within 4 business days after the clinic has received the results. If you do not hear from us within 7 days, please contact the clinic through MyChart or phone. If you have a critical or abnormal lab result, we will notify you by phone as soon as possible.  Submit refill requests through [employer] or call your pharmacy and they will forward the refill request to us. Please allow 3 business days for your refill to be completed.          Additional Information About Your Visit        Care EveryWhere ID     This is your Care EveryWhere ID. This could be used by other organizations to access your [place] medical records  [id number]         Blood Pressure from Last 3 Encounters:   10/19/18 144/84   10/02/18 (!) 151/92   01/08/18 150/76    Weight from Last 3 Encounters:   10/02/18 103.9 kg (229  lb)   01/08/18 108 kg (238 lb)   01/06/17 103.4 kg (228 lb)              We Performed the Following     MANUAL THER TECH,1+REGIONS,EA 15 MIN     THERAPEUTIC EXERCISES        Primary Care Provider Office Phone # Fax #    Prakash Gonzalez [phone]       [facility] [address]  [facility] [address]        Equal Access to Services     [facility]: Hadii aad ku hadasho Soomaali, waaxda luqadaha, qaybta kaalmada adeegyada, waxay idiin hayaan adeeg kharash la'aan . So [facility] [phone].    ATENCIÓN: Si mellissa faye, tiene a jaramillo disposición servicios gratuitos de asistencia lingüística. Llame al [phone].    We comply with applicable federal civil rights laws and [place] laws. We do not discriminate on the basis of race, color, national origin, age, disability, sex, sexual orientation, or gender identity.            Thank you!     Thank you for choosing [facility]   for your care. Our goal is always to provide you with excellent care. Hearing back from our patients is one way we can continue to improve our services. Please take a few minutes to complete the written survey that you may receive in the mail after your visit with us. Thank you!             Your Updated Medication List - Protect others around you: Learn how to safely use, store and throw away your medicines at www.disposemymeds.org.          This list is accurate as of 11/15/18  8:54 PM.  Always use your most recent med list.                   Brand Name Dispense Instructions for use Diagnosis    hydrochlorothiazide 25 MG tablet    HYDRODIURIL     1 TABLET EVERY MORNING        ipratropium 0.06 % spray    ATROVENT          levothyroxine 137 MCG tablet    SYNTHROID/LEVOTHROID     137 mcg daily        lisinopril 40 MG tablet    PRINIVIL/ZESTRIL    90 tablet    Take 1 tablet (40 mg) by mouth daily    Benign essential hypertension       metoprolol succinate 100 MG 24 hr tablet    TOPROL-XL     100 mg  daily        order for DME     1 each    please draw INR the week of February 19-23. Please fax results to [phone]. [facility].  phone number [phone]    Long-term (current) use of anticoagulants, Chronic atrial fibrillation (H)       simvastatin 40 MG tablet    ZOCOR     40 mg daily        VITAMIN D3 PO      Take 500 Units by mouth daily        warfarin 5 MG tablet    COUMADIN     Take 1.5 tablets Mondays and Thursdays and 1 tablet all other days of the week or as directed    Chronic atrial fibrillation (H)

## 2018-11-15 NOTE — PROGRESS NOTES
ANTICOAGULATION FOLLOW-UP CLINIC VISIT    Patient Name:  William Lechuga  Date:  11/15/2018  Contact Type:  Face to Face    SUBJECTIVE:     Patient Findings     Positives No Problem Findings           OBJECTIVE    INR Protime   Date Value Ref Range Status   11/15/2018 2.6 (A) 0.86 - 1.14 Final       ASSESSMENT / PLAN  INR assessment THER    Recheck INR In: 6 WEEKS    INR Location Clinic      Anticoagulation Summary as of 11/15/2018     INR goal 2.0-3.0   Today's INR 2.6   Warfarin maintenance plan 7.5 mg (5 mg x 1.5) on Mon; 5 mg (5 mg x 1) all other days   Full warfarin instructions 7.5 mg on Mon; 5 mg all other days   Weekly warfarin total 37.5 mg   No change documented Arminda Liu   Plan last modified Ariana Arriaga (3/29/2016)   Next INR check 12/27/2018   Target end date     Indications   Long-term (current) use of anticoagulants [Z79.01] [Z79.01]  Chronic atrial fibrillation (H) [I48.2]         Anticoagulation Episode Summary     INR check location     Preferred lab     Send INR reminders to [facility]    Comments       Anticoagulation Care Providers     Provider Role Specialty Phone number    Narcisa Mcmahon MD [facility] [phone]            See the Encounter Report to view Anticoagulation Flowsheet and Dosing Calendar (Go to Encounters tab in chart review, and find the Anticoagulation Therapy Visit)        ARMINDA LIU

## 2018-11-15 NOTE — MR AVS SNAPSHOT
William ALEXANDER Alexandrea   11/15/2018 8:30 AM   Anticoagulation Therapy Visit    Description:  78 year old male   Provider:  [facility]   Department:  Be Nurse           INR as of 11/15/2018     Today's INR 2.6      Anticoagulation Summary as of 11/15/2018     INR goal 2.0-3.0   Today's INR 2.6   Full warfarin instructions 7.5 mg on Mon; 5 mg all other days   Next INR check 12/27/2018    Indications   Long-term (current) use of anticoagulants [Z79.01] [Z79.01]  Chronic atrial fibrillation (H) [I48.2]         Your next Anticoagulation Clinic appointment(s)     Dec 27, 2018  8:30 AM CST   Anticoagulation Visit with BE ANTI COAG   [facility] [place] ([facility] [place]    [address]  [place] [id number] [phone]              Contact Numbers     [facility]  Please call [phone] with any problems or questions regarding your therapy, or to cancel or reschedule your appointment.          November 2018 Details    Sun Mon Tue Wed Thu Fri Sat         1               2               3                 4               5               6               7               8               9               10                 11               12               13               14               15      5 mg   See details      16      5 mg         17      5 mg           18      5 mg         19      7.5 mg         20      5 mg         21      5 mg         22      5 mg         23      5 mg         24      5 mg           25      5 mg         26      7.5 mg         27      5 mg         28      5 mg         29      5 mg         30      5 mg           Date Details   11/15 This INR check               How to take your warfarin dose     To take:  5 mg Take 1 of the 5 mg tablets.    To take:  7.5 mg Take 1.5 of the 5 mg tablets.           December 2018 Details    Sun Mon Tue Wed Thu Fri Sat           1      5 mg           2      5 mg         3      7.5 mg         4      5 mg         5      5 mg         6      5 mg          7      5 mg         8      5 mg           9      5 mg         10      7.5 mg         11      5 mg         12      5 mg         13      5 mg         14      5 mg         15      5 mg           16      5 mg         17      7.5 mg         18      5 mg         19      5 mg         20      5 mg         21      5 mg         22      5 mg           23      5 mg         24      7.5 mg         25      5 mg         26      5 mg         27            28               29                 30               31                     Date Details   No additional details    Date of next INR:  12/27/2018         How to take your warfarin dose     To take:  5 mg Take 1 of the 5 mg tablets.    To take:  7.5 mg Take 1.5 of the 5 mg tablets.

## 2018-11-16 NOTE — PROGRESS NOTES
Subjective:  HPI                    Objective:  System    Physical Exam    General     ROS    Assessment/Plan:    SUBJECTIVE  Subjective: Starting to feel the difference now.  Can't turn a lot but it's more than he could before.  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  Response to therapy has shown an improvement in  pain level and ROM   Progress made towards STG/LTG?  Yes (See Goal flowsheet attached for updates on achievement of STG and LTG)    PLAN  Current treatment program is being advanced to more complex exercises.    PTA/ATC plan:  N/A    Please refer to the daily flowsheet for treatment today, total treatment time and time spent performing 1:1 timed codes.           (2) soft/nontender

## 2018-11-29 ENCOUNTER — TELEPHONE (OUTPATIENT)
Dept: PHYSICAL THERAPY | Facility: CLINIC | Age: 78
End: 2018-11-29

## 2018-11-29 DIAGNOSIS — M54.2 CERVICALGIA: ICD-10-CM

## 2018-11-29 NOTE — TELEPHONE ENCOUNTER
Spoke with patient after he cancelled last PT appt.  Strained his back and didn't feel up to driving today.  Feels his neck is progressing slowly and he would like more time to work on exercises and will call with an update in 1 month

## 2018-12-31 ENCOUNTER — ANTICOAGULATION THERAPY VISIT (OUTPATIENT)
Dept: NURSING | Facility: CLINIC | Age: 78
End: 2018-12-31
Payer: MEDICARE

## 2018-12-31 DIAGNOSIS — I48.20 CHRONIC ATRIAL FIBRILLATION (H): ICD-10-CM

## 2018-12-31 LAB — INR POINT OF CARE: 2.9 (ref 0.86–1.14)

## 2018-12-31 PROCEDURE — 99207 ZZC NO CHARGE NURSE ONLY: CPT

## 2018-12-31 PROCEDURE — 36416 COLLJ CAPILLARY BLOOD SPEC: CPT

## 2018-12-31 PROCEDURE — 85610 PROTHROMBIN TIME: CPT | Mod: QW

## 2018-12-31 NOTE — PROGRESS NOTES
ANTICOAGULATION FOLLOW-UP CLINIC VISIT    Patient Name:  William Lechuga  Date:  2018  Contact Type:  Face to Face    SUBJECTIVE:     Patient Findings     Positives:   No Problem Findings    Comments:   INR 2.9    Patient denies any signs or symptoms of bleeding or clotting. Patient denies any diet changes, medication changes or changes to health. No change made to Coumadin medication regimen and follow up due in 6 weeks.  Patient leaving for [place] on 18 and will be gone through 2019. Order placed for patient to have INR checked in [place] and have results faxed up to us. Given patient lab order printed out for him to bring to [place].           OBJECTIVE    INR Protime   Date Value Ref Range Status   2018 2.9 (A) 0.86 - 1.14 Final       ASSESSMENT / PLAN  INR assessment THER    Recheck INR In: 6 WEEKS    INR Location Clinic      Anticoagulation Summary  As of 2018    INR goal:   2.0-3.0   TTR:   90.0 % (2.7 y)   INR used for dosin.9 (2018)   Warfarin maintenance plan:   7.5 mg (5 mg x 1.5) every Mon; 5 mg (5 mg x 1) all other days   Full warfarin instructions:   7.5 mg every Mon; 5 mg all other days   Weekly warfarin total:   37.5 mg   No change documented:   Yanni Zarate RN   Plan last modified:   Ariana Arriaga (3/29/2016)   Next INR check:   2019   Target end date:       Indications    Long-term (current) use of anticoagulants [Z79.01] [Z79.01]  Chronic atrial fibrillation (H) [I48.2]             Anticoagulation Episode Summary     INR check location:       Preferred lab:       Send INR reminders to:   [facility]    Comments:         Anticoagulation Care Providers     Provider Role Specialty Phone number    Narcisa Mcmahon MD [facility] [phone]            See the Encounter Report to view Anticoagulation Flowsheet and Dosing Calendar (Go to Encounters tab in chart review, and find the Anticoagulation Therapy  Visit)        Yanni Zarate RN

## 2019-02-13 ENCOUNTER — TELEPHONE (OUTPATIENT)
Dept: INTERNAL MEDICINE | Facility: CLINIC | Age: 79
End: 2019-02-13

## 2019-02-13 ENCOUNTER — TRANSFERRED RECORDS (OUTPATIENT)
Dept: HEALTH INFORMATION MANAGEMENT | Facility: CLINIC | Age: 79
End: 2019-02-13

## 2019-02-13 ENCOUNTER — ANTICOAGULATION THERAPY VISIT (OUTPATIENT)
Dept: NURSING | Facility: CLINIC | Age: 79
End: 2019-02-13
Payer: MEDICARE

## 2019-02-13 DIAGNOSIS — I48.20 CHRONIC ATRIAL FIBRILLATION (H): ICD-10-CM

## 2019-02-13 LAB — INR PPP: 2.8

## 2019-02-13 PROCEDURE — 99207 ZZC NO CHARGE NURSE ONLY: CPT | Performed by: INTERNAL MEDICINE

## 2019-02-13 NOTE — PROGRESS NOTES
ANTICOAGULATION FOLLOW-UP CLINIC VISIT    Patient Name:  William Lechuga  Date:  2019  Contact Type:  Telephone/ William Lechuga    SUBJECTIVE:     Patient Findings     Positives:   No Problem Findings    Comments:   INR 2.8    Patient denies any signs or symptoms of bleeding or clotting. Patient denies any diet changes, medication changes or changes to health. No change made to Coumadin medication regimen and follow up due in 6 weeks.             OBJECTIVE    INR   Date Value Ref Range Status   2019 2.8  Final       ASSESSMENT / PLAN  INR assessment THER    Recheck INR In: 6 WEEKS    INR Location Clinic      Anticoagulation Summary  As of 2019    INR goal:   2.0-3.0   TTR:   90.4 % (2.9 y)   INR used for dosin.8 (2019)   Warfarin maintenance plan:   7.5 mg (5 mg x 1.5) every Mon; 5 mg (5 mg x 1) all other days   Full warfarin instructions:   7.5 mg every Mon; 5 mg all other days   Weekly warfarin total:   37.5 mg   No change documented:   Yanni Zarate RN   Plan last modified:   Ariana Arriaga (3/29/2016)   Next INR check:   2019   Target end date:       Indications    Long-term (current) use of anticoagulants [Z79.01] [Z79.01]  Chronic atrial fibrillation (H) [I48.2]             Anticoagulation Episode Summary     INR check location:       Preferred lab:       Send INR reminders to:   [facility]    Comments:         Anticoagulation Care Providers     Provider Role Specialty Phone number    Narcisa Mcmahon MD [facility] [phone]            See the Encounter Report to view Anticoagulation Flowsheet and Dosing Calendar (Go to Encounters tab in chart review, and find the Anticoagulation Therapy Visit)        Yanni Zarate RN

## 2019-02-13 NOTE — TELEPHONE ENCOUNTER
Continue same dosin.5 mg every Mon; 5 mg all other days    Recheck INR in 6 weeks. Scheduled patient to return to INR on 19.    Patient verbalized understanding and agrees with plan.     Please see anticoagulation encounter from today 19    Yanni Zarate, RN, BSN

## 2019-04-01 ENCOUNTER — ANTICOAGULATION THERAPY VISIT (OUTPATIENT)
Dept: NURSING | Facility: CLINIC | Age: 79
End: 2019-04-01
Payer: MEDICARE

## 2019-04-01 ENCOUNTER — TELEPHONE (OUTPATIENT)
Dept: INTERNAL MEDICINE | Facility: CLINIC | Age: 79
End: 2019-04-01

## 2019-04-01 DIAGNOSIS — I48.20 CHRONIC ATRIAL FIBRILLATION (H): Primary | Chronic | ICD-10-CM

## 2019-04-01 DIAGNOSIS — I48.20 CHRONIC ATRIAL FIBRILLATION (H): ICD-10-CM

## 2019-04-01 LAB — INR POINT OF CARE: 2.1 (ref 0.86–1.14)

## 2019-04-01 PROCEDURE — 99207 ZZC NO CHARGE NURSE ONLY: CPT

## 2019-04-01 PROCEDURE — 36416 COLLJ CAPILLARY BLOOD SPEC: CPT

## 2019-04-01 PROCEDURE — 85610 PROTHROMBIN TIME: CPT | Mod: QW

## 2019-04-01 NOTE — PROGRESS NOTES
ANTICOAGULATION FOLLOW-UP CLINIC VISIT    Patient Name:  William Lechuga  Date:  2019  Contact Type:  Face to Face    SUBJECTIVE:     Patient Findings     Comments:   The patient was assessed for diet, medication, and activity level changes, missed or extra doses, bruising or bleeding, with no problem findings.    Patient is having INR drawn at [facility] on May 8th. Patient stated he will get results to [facility] INR clinic. Patient scheduled for tentative 6 weeks from May 8th (if INR in range at May 8th draw).            OBJECTIVE    INR Protime   Date Value Ref Range Status   2019 2.1 (A) 0.86 - 1.14 Final       ASSESSMENT / PLAN  INR assessment THER    Recheck INR In: 6 WEEKS    INR Location Outside lab      Anticoagulation Summary  As of 2019    INR goal:   2.0-3.0   TTR:   90.8 % (3 y)   INR used for dosin.1 (2019)   Warfarin maintenance plan:   7.5 mg (5 mg x 1.5) every Mon; 5 mg (5 mg x 1) all other days   Full warfarin instructions:   7.5 mg every Mon; 5 mg all other days   Weekly warfarin total:   37.5 mg   No change documented:   Nadine Fried, RN   Plan last modified:   Ariana Arriaga (3/29/2016)   Next INR check:   2019   Target end date:       Indications    Long-term (current) use of anticoagulants [Z79.01] [Z79.01]  Chronic atrial fibrillation (H) [I48.2]             Anticoagulation Episode Summary     INR check location:       Preferred lab:       Send INR reminders to:   [facility]    Comments:         Anticoagulation Care Providers     Provider Role Specialty Phone number    Narcisa Mcmahon MD [facility] [phone]            See the Encounter Report to view Anticoagulation Flowsheet and Dosing Calendar (Go to Encounters tab in chart review, and find the Anticoagulation Therapy Visit)      Nadine Fried RN

## 2019-04-01 NOTE — TELEPHONE ENCOUNTER
Patients is due for yearly INR referral.    Referral pended for approval.     Nadine Fried, RN, BSN, PHN

## 2019-05-08 LAB — INR POINT OF CARE: 2.7 (ref 0.86–1.14)

## 2019-05-13 ENCOUNTER — ANTICOAGULATION THERAPY VISIT (OUTPATIENT)
Dept: INTERNAL MEDICINE | Facility: CLINIC | Age: 79
End: 2019-05-13
Payer: MEDICARE

## 2019-05-13 DIAGNOSIS — I48.20 CHRONIC ATRIAL FIBRILLATION (H): ICD-10-CM

## 2019-05-13 PROCEDURE — 36416 COLLJ CAPILLARY BLOOD SPEC: CPT | Performed by: INTERNAL MEDICINE

## 2019-05-13 PROCEDURE — 99207 ZZC NO CHARGE NURSE ONLY: CPT | Performed by: INTERNAL MEDICINE

## 2019-05-13 PROCEDURE — 85610 PROTHROMBIN TIME: CPT | Mod: QW | Performed by: INTERNAL MEDICINE

## 2019-05-13 NOTE — PROGRESS NOTES
ANTICOAGULATION FOLLOW-UP CLINIC VISIT    Patient Name:  William Lechuga  Date:  2019  Contact Type:  Telephone    SUBJECTIVE:  Patient Findings     Comments:   The patient was assessed for diet, medication, and activity level changes, missed or extra doses, bruising or bleeding, with no problem findings.          Clinical Outcomes     Negatives:   Major bleeding event, Thromboembolic event, Anticoagulation-related hospital admission, Anticoagulation-related ED visit, Anticoagulation-related fatality    Comments:   The patient was assessed for diet, medication, and activity level changes, missed or extra doses, bruising or bleeding, with no problem findings.             OBJECTIVE    INR Protime   Date Value Ref Range Status   2019 2.7 (A) 0.86 - 1.14 Final       ASSESSMENT / PLAN  INR assessment THER    Recheck INR In: 6 WEEKS    INR Location Clinic      Anticoagulation Summary  As of 2019    INR goal:   2.0-3.0   TTR:   91.1 % (3.1 y)   INR used for dosin.7 (2019)   Warfarin maintenance plan:   7.5 mg (5 mg x 1.5) every Mon; 5 mg (5 mg x 1) all other days   Full warfarin instructions:   7.5 mg every Mon; 5 mg all other days   Weekly warfarin total:   37.5 mg   No change documented:   Nadine Fried, RN   Plan last modified:   Ariana Arriaga (3/29/2016)   Next INR check:   2019   Target end date:       Indications    Long-term (current) use of anticoagulants [Z79.01] [Z79.01]  Chronic atrial fibrillation (H) [I48.2]             Anticoagulation Episode Summary     INR check location:       Preferred lab:       Send INR reminders to:   [facility]    Comments:         Anticoagulation Care Providers     Provider Role Specialty Phone number    Narcisa Mcmahon MD [facility] [phone]            See the Encounter Report to view Anticoagulation Flowsheet and Dosing Calendar (Go to Encounters tab in chart review, and find the Anticoagulation Therapy  "Visit)    Patient was seen on 5/8 at [facility] and was advised to possibly try \"new medication\" such as, Eliquis or Pradaxa and stop warfarin.   Patient states co pay is too high ($250 for Eliquis) so patient is going to call insurance to see if another medication would be covered. If patient does stop coumadin, patient will call clinic to inform.       Nadine Fried RN                 "

## 2019-06-17 ENCOUNTER — ANTICOAGULATION THERAPY VISIT (OUTPATIENT)
Dept: NURSING | Facility: CLINIC | Age: 79
End: 2019-06-17
Payer: MEDICARE

## 2019-06-17 DIAGNOSIS — I48.20 CHRONIC ATRIAL FIBRILLATION (H): ICD-10-CM

## 2019-06-17 LAB — INR POINT OF CARE: 3.1 (ref 0.86–1.14)

## 2019-06-17 PROCEDURE — 85610 PROTHROMBIN TIME: CPT | Mod: QW

## 2019-06-17 PROCEDURE — 36416 COLLJ CAPILLARY BLOOD SPEC: CPT

## 2019-06-17 PROCEDURE — 99207 ZZC NO CHARGE NURSE ONLY: CPT

## 2019-06-17 NOTE — PROGRESS NOTES
"ANTICOAGULATION FOLLOW-UP CLINIC VISIT    Patient Name:  William Lechuga  Date:  6/17/2019  Contact Type:  Face to Face    SUBJECTIVE:  Patient Findings     Comments:   Patient states he was \"over served\" this weekend and had a couple too many beers.   Per protocol patient is within goal range, will recheck in 6 weeks.         Clinical Outcomes     Negatives:   Major bleeding event, Thromboembolic event, Anticoagulation-related hospital admission, Anticoagulation-related ED visit, Anticoagulation-related fatality    Comments:   Patient states he was \"over served\" this weekend and had a couple too many beers.   Per protocol patient is within goal range, will recheck in 6 weeks.            OBJECTIVE    INR Protime   Date Value Ref Range Status   06/17/2019 3.1 (A) 0.86 - 1.14 Final       ASSESSMENT / PLAN  INR assessment THER    Recheck INR In: 6 WEEKS    INR Location Clinic      Anticoagulation Summary  As of 6/17/2019    INR goal:   2.0-3.0   TTR:   90.6 % (3.2 y)   INR used for dosing:   3.1! (6/17/2019)   Warfarin maintenance plan:   7.5 mg (5 mg x 1.5) every Mon; 5 mg (5 mg x 1) all other days   Full warfarin instructions:   7.5 mg every Mon; 5 mg all other days   Weekly warfarin total:   37.5 mg   No change documented:   Nadine Fried RN   Plan last modified:   Ariana Arriaga (3/29/2016)   Next INR check:   7/29/2019   Target end date:       Indications    Long-term (current) use of anticoagulants [Z79.01] [Z79.01]  Chronic atrial fibrillation (H) [I48.2]             Anticoagulation Episode Summary     INR check location:       Preferred lab:       Send INR reminders to:   [facility]    Comments:         Anticoagulation Care Providers     Provider Role Specialty Phone number    Narcisa Mcmahon MD [facility] [phone]            See the Encounter Report to view Anticoagulation Flowsheet and Dosing Calendar (Go to Encounters tab in chart review, and find the Anticoagulation " Therapy Visit)    Dosage adjustment made based on physician directed care plan.    Nadine Fried RN

## 2019-07-09 PROBLEM — M54.2 CERVICALGIA: Status: RESOLVED | Noted: 2018-10-19 | Resolved: 2019-07-09

## 2019-07-09 NOTE — PROGRESS NOTES
Discharge Note    Progress reporting period is from initial evaluation date (please see noted date below) to Nov 15, 2018.  No linked episodes      William failed to follow up and current status is unknown.  Please see information below for last relevant information on current status.  Patient seen for 3 visits.    SUBJECTIVE  Subjective changes noted by patient:  Starting to feel the difference now.  Can't turn a lot but it's more than he could before.  Loves the towel mob exercise   .  Current pain level is 5/10.     Previous pain level was  8/10.   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    Last current status:     Self Management Plans:  HEP  I have re-evaluated this patient and find that the nature, scope, duration and intensity of the therapy is appropriate for the medical condition of the patient.  William continues to require the following intervention to meet STG and LTG's:  HEP.    Recommendations:  Discharge with current home program.  Patient to follow up with MD as needed.    Please refer to the daily flowsheet for treatment today, total treatment time and time spent performing 1:1 timed codes.

## 2019-07-29 ENCOUNTER — ANTICOAGULATION THERAPY VISIT (OUTPATIENT)
Dept: NURSING | Facility: CLINIC | Age: 79
End: 2019-07-29
Payer: MEDICARE

## 2019-07-29 DIAGNOSIS — Z79.01 LONG TERM CURRENT USE OF ANTICOAGULANT THERAPY: ICD-10-CM

## 2019-07-29 DIAGNOSIS — I48.20 CHRONIC ATRIAL FIBRILLATION (H): ICD-10-CM

## 2019-07-29 LAB — INR POINT OF CARE: 3.3 (ref 0.86–1.14)

## 2019-07-29 PROCEDURE — 85610 PROTHROMBIN TIME: CPT | Mod: QW

## 2019-07-29 PROCEDURE — 36416 COLLJ CAPILLARY BLOOD SPEC: CPT

## 2019-07-29 PROCEDURE — 99207 ZZC NO CHARGE NURSE ONLY: CPT

## 2019-07-29 NOTE — PROGRESS NOTES
"ANTICOAGULATION FOLLOW-UP CLINIC VISIT    Patient Name:  William Lechuga  Date:  7/29/2019  Contact Type:  Face to Face    SUBJECTIVE:  Patient Findings     Comments:   Patient states he did not eat as many greens in the last couple weeks and patient also had 4 beers yesterday while watching the [place] racing.   Patient refused to come back within the 2 week protocol as patient states he will be entertaining family from [place] at the cabin.   RN educated on importance of checking INR.   Patient also refused to change and dosing as patient believes this is just from his drinking - patient states he will not drink before his appointment in 3 weeks.   RN educated patient on protocol of having 2 high readings in a row to change maintenance - RN also discussed if this is going to be the new \"normal\" for patient that a change in maintenance would possibly need to happen.     Appointment scheduled for 3 weeks - per patient refusal to be seen sooner.          Clinical Outcomes     Comments:   Patient states he did not eat as many greens in the last couple weeks and patient also had 4 beers yesterday while watching the [place] racing.   Patient refused to come back within the 2 week protocol as patient states he will be entertaining family from [place] at the cabin.   RN educated on importance of checking INR.   Patient also refused to change and dosing as patient believes this is just from his drinking - patient states he will not drink before his appointment in 3 weeks.   RN educated patient on protocol of having 2 high readings in a row to change maintenance - RN also discussed if this is going to be the new \"normal\" for patient that a change in maintenance would possibly need to happen.     Appointment scheduled for 3 weeks - per patient refusal to be seen sooner.             OBJECTIVE    INR Protime   Date Value Ref Range Status   07/29/2019 3.3 (A) 0.86 - 1.14 Final       ASSESSMENT / PLAN  INR assessment SUPRA  "   Recheck INR In: 3 WEEKS    INR Location Clinic      Anticoagulation Summary  As of 7/29/2019    INR goal:   2.0-3.0   TTR:   87.4 % (3.3 y)   INR used for dosing:   3.3! (7/29/2019)   Warfarin maintenance plan:   7.5 mg (5 mg x 1.5) every Mon; 5 mg (5 mg x 1) all other days   Full warfarin instructions:   7.5 mg every Mon; 5 mg all other days   Weekly warfarin total:   37.5 mg   No change documented:   Nadine Fried RN   Plan last modified:   Ariana Arriaga (3/29/2016)   Next INR check:   8/20/2019   Target end date:       Indications    Long-term (current) use of anticoagulants [Z79.01] [Z79.01]  Chronic atrial fibrillation (H) [I48.2]             Anticoagulation Episode Summary     INR check location:       Preferred lab:       Send INR reminders to:   ADWOA BLOOD    Comments:         Anticoagulation Care Providers     Provider Role Specialty Phone number    Narcisa Mcmahon MD [facility] [phone]            See the Encounter Report to view Anticoagulation Flowsheet and Dosing Calendar (Go to Encounters tab in chart review, and find the Anticoagulation Therapy Visit)    Dosage adjustment made based on physician directed care plan.    Nadine Fried RN

## 2019-08-14 ENCOUNTER — ANTICOAGULATION THERAPY VISIT (OUTPATIENT)
Dept: NURSING | Facility: CLINIC | Age: 79
End: 2019-08-14
Payer: MEDICARE

## 2019-08-14 DIAGNOSIS — Z79.01 LONG TERM CURRENT USE OF ANTICOAGULANT THERAPY: ICD-10-CM

## 2019-08-14 DIAGNOSIS — I48.20 CHRONIC ATRIAL FIBRILLATION (H): ICD-10-CM

## 2019-08-14 LAB — INR POINT OF CARE: 3 (ref 0.86–1.14)

## 2019-08-14 PROCEDURE — 85610 PROTHROMBIN TIME: CPT | Mod: QW

## 2019-08-14 PROCEDURE — 36416 COLLJ CAPILLARY BLOOD SPEC: CPT

## 2019-08-14 PROCEDURE — 99207 ZZC NO CHARGE NURSE ONLY: CPT

## 2019-08-14 NOTE — PROGRESS NOTES
"ANTICOAGULATION FOLLOW-UP CLINIC VISIT    Patient Name:  William Lechuga  Date:  8/14/2019  Contact Type:  Face to Face    SUBJECTIVE:  Patient Findings     Comments:   Patient states his family was in town earlier this week so he had a \"few\" beers every night.   Patient in range. Will go out normal 6 weeks.        Clinical Outcomes     Negatives:   Major bleeding event, Thromboembolic event, Anticoagulation-related hospital admission, Anticoagulation-related ED visit, Anticoagulation-related fatality    Comments:   Patient states his family was in town earlier this week so he had a \"few\" beers every night.   Patient in range. Will go out normal 6 weeks.           OBJECTIVE    INR Protime   Date Value Ref Range Status   08/14/2019 3.0 (A) 0.86 - 1.14 Final       ASSESSMENT / PLAN  INR assessment THER    Recheck INR In: 6 WEEKS    INR Location Clinic      Anticoagulation Summary  As of 8/14/2019    INR goal:   2.0-3.0   TTR:   86.3 % (3.4 y)   INR used for dosing:   3.0 (8/14/2019)   Warfarin maintenance plan:   7.5 mg (5 mg x 1.5) every Mon; 5 mg (5 mg x 1) all other days   Full warfarin instructions:   7.5 mg every Mon; 5 mg all other days   Weekly warfarin total:   37.5 mg   No change documented:   Nadine Fried RN   Plan last modified:   Ariana Arriaga (3/29/2016)   Next INR check:   9/24/2019   Target end date:       Indications    Long-term (current) use of anticoagulants [Z79.01] [Z79.01]  Chronic atrial fibrillation (H) [I48.2]             Anticoagulation Episode Summary     INR check location:       Preferred lab:       Send INR reminders to:   ADWOA BLOOD    Comments:         Anticoagulation Care Providers     Provider Role Specialty Phone number    Narcisa Mcmahon MD [facility] [phone]            See the Encounter Report to view Anticoagulation Flowsheet and Dosing Calendar (Go to Encounters tab in chart review, and find the Anticoagulation Therapy Visit)    Dosage " adjustment made based on physician directed care plan.    Nadine Fried RN

## 2019-08-21 DIAGNOSIS — G47.33 OSA (OBSTRUCTIVE SLEEP APNEA): Primary | ICD-10-CM

## 2019-08-21 NOTE — PROGRESS NOTES
LOV 1-8-2018 TA Abel. Return 2 years. Patient needs updated DME Rx to get supplies. Please sign order and sign fax in MD box. Thanks,

## 2019-09-24 ENCOUNTER — ANTICOAGULATION THERAPY VISIT (OUTPATIENT)
Dept: NURSING | Facility: CLINIC | Age: 79
End: 2019-09-24
Payer: MEDICARE

## 2019-09-24 DIAGNOSIS — Z79.01 LONG TERM CURRENT USE OF ANTICOAGULANT THERAPY: ICD-10-CM

## 2019-09-24 DIAGNOSIS — I48.20 CHRONIC ATRIAL FIBRILLATION (H): ICD-10-CM

## 2019-09-24 LAB — INR POINT OF CARE: 2.2 (ref 0.86–1.14)

## 2019-09-24 PROCEDURE — 85610 PROTHROMBIN TIME: CPT | Mod: QW

## 2019-09-24 PROCEDURE — 36416 COLLJ CAPILLARY BLOOD SPEC: CPT

## 2019-09-24 PROCEDURE — 99207 ZZC NO CHARGE NURSE ONLY: CPT

## 2019-09-24 NOTE — PROGRESS NOTES
ANTICOAGULATION FOLLOW-UP CLINIC VISIT    Patient Name:  William Lechuga  Date:  2019  Contact Type:  Face to Face    SUBJECTIVE:  Patient Findings     Comments:   Denies problems, he did try to do 5 mg daily for about 5 weeks but then forget and took his current maintenance of 7.5 mg Mon, 5 mg row and is therapeutic today. So will stay on current dose        Clinical Outcomes     Comments:   Denies problems, he did try to do 5 mg daily for about 5 weeks but then forget and took his current maintenance of 7.5 mg Mon, 5 mg row and is therapeutic today. So will stay on current dose           OBJECTIVE    INR Protime   Date Value Ref Range Status   2019 2.2 (A) 0.86 - 1.14 Final       ASSESSMENT / PLAN  INR assessment THER    Recheck INR In: 6 WEEKS    INR Location Clinic      Anticoagulation Summary  As of 2019    INR goal:   2.0-3.0   TTR:   86.7 % (3.5 y)   INR used for dosin.2 (2019)   Warfarin maintenance plan:   7.5 mg (5 mg x 1.5) every Mon; 5 mg (5 mg x 1) all other days   Full warfarin instructions:   7.5 mg every Mon; 5 mg all other days   Weekly warfarin total:   37.5 mg   No change documented:   Arminda Liu   Plan last modified:   Ariana Arriaga (3/29/2016)   Next INR check:   2019   Target end date:       Indications    Long-term (current) use of anticoagulants [Z79.01] [Z79.01]  Chronic atrial fibrillation (H) [I48.2]             Anticoagulation Episode Summary     INR check location:       Preferred lab:       Send INR reminders to:   ADWOA BLOOD    Comments:         Anticoagulation Care Providers     Provider Role Specialty Phone number    Narcisa Mcmahon MD [facility] [phone]            See the Encounter Report to view Anticoagulation Flowsheet and Dosing Calendar (Go to Encounters tab in chart review, and find the Anticoagulation Therapy Visit)    Dosage adjustment made based on physician directed care plan.    ARMINDA MCKAY  SHABANA

## 2019-11-05 ENCOUNTER — ANTICOAGULATION THERAPY VISIT (OUTPATIENT)
Dept: NURSING | Facility: CLINIC | Age: 79
End: 2019-11-05
Payer: MEDICARE

## 2019-11-05 DIAGNOSIS — Z79.01 LONG TERM CURRENT USE OF ANTICOAGULANT THERAPY: ICD-10-CM

## 2019-11-05 DIAGNOSIS — I48.20 CHRONIC ATRIAL FIBRILLATION (H): ICD-10-CM

## 2019-11-05 LAB — INR POINT OF CARE: 2.7 (ref 0.86–1.14)

## 2019-11-05 PROCEDURE — 85610 PROTHROMBIN TIME: CPT | Mod: QW

## 2019-11-05 PROCEDURE — 99207 ZZC NO CHARGE NURSE ONLY: CPT

## 2019-11-05 PROCEDURE — 36416 COLLJ CAPILLARY BLOOD SPEC: CPT

## 2019-11-05 NOTE — PROGRESS NOTES
ANTICOAGULATION FOLLOW-UP CLINIC VISIT    Patient Name:  William Lechuga  Date:  2019  Contact Type:  Face to Face    SUBJECTIVE:  Patient Findings     Comments:   No problems        Clinical Outcomes     Comments:   No problems           OBJECTIVE    INR Protime   Date Value Ref Range Status   2019 2.7 (A) 0.86 - 1.14 Final       ASSESSMENT / PLAN  INR assessment THER    Recheck INR In: 6 WEEKS    INR Location Clinic      Anticoagulation Summary  As of 2019    INR goal:   2.0-3.0   TTR:   87.2 % (3.6 y)   INR used for dosin.7 (2019)   Warfarin maintenance plan:   7.5 mg (5 mg x 1.5) every Mon; 5 mg (5 mg x 1) all other days   Full warfarin instructions:   7.5 mg every Mon; 5 mg all other days   Weekly warfarin total:   37.5 mg   No change documented:   Arminda Liu   Plan last modified:   Ariana Arriaga (3/29/2016)   Next INR check:   2019   Target end date:       Indications    Long-term (current) use of anticoagulants [Z79.01] [Z79.01]  Chronic atrial fibrillation [I48.20]             Anticoagulation Episode Summary     INR check location:       Preferred lab:       Send INR reminders to:   ADWOA BLOOD    Comments:         Anticoagulation Care Providers     Provider Role Specialty Phone number    Narcisa Mcmahon MD [facility] [phone]            See the Encounter Report to view Anticoagulation Flowsheet and Dosing Calendar (Go to Encounters tab in chart review, and find the Anticoagulation Therapy Visit)        ARMINDA LIU

## 2019-11-20 ENCOUNTER — OFFICE VISIT (OUTPATIENT)
Dept: URGENT CARE | Facility: URGENT CARE | Age: 79
End: 2019-11-20
Payer: MEDICARE

## 2019-11-20 VITALS
BODY MASS INDEX: 31.19 KG/M2 | SYSTOLIC BLOOD PRESSURE: 185 MMHG | WEIGHT: 230 LBS | OXYGEN SATURATION: 95 % | DIASTOLIC BLOOD PRESSURE: 80 MMHG | TEMPERATURE: 98.2 F | RESPIRATION RATE: 18 BRPM | HEART RATE: 85 BPM

## 2019-11-20 DIAGNOSIS — J20.9 ACUTE BRONCHITIS WITH SYMPTOMS > 10 DAYS: ICD-10-CM

## 2019-11-20 DIAGNOSIS — I10 ESSENTIAL HYPERTENSION: Chronic | ICD-10-CM

## 2019-11-20 DIAGNOSIS — J01.90 ACUTE SINUSITIS WITH SYMPTOMS > 10 DAYS: Primary | ICD-10-CM

## 2019-11-20 DIAGNOSIS — C73 MALIGNANT NEOPLASM OF THYROID GLAND (H): Chronic | ICD-10-CM

## 2019-11-20 DIAGNOSIS — Z79.01 LONG TERM CURRENT USE OF ANTICOAGULANT THERAPY: ICD-10-CM

## 2019-11-20 DIAGNOSIS — I48.20 CHRONIC ATRIAL FIBRILLATION (H): Chronic | ICD-10-CM

## 2019-11-20 PROCEDURE — 99214 OFFICE O/P EST MOD 30 MIN: CPT | Performed by: FAMILY MEDICINE

## 2019-11-20 RX ORDER — ROSUVASTATIN CALCIUM 10 MG/1
10 TABLET, COATED ORAL
COMMUNITY
Start: 2019-05-09 | End: 2022-12-07

## 2019-11-20 RX ORDER — CEFDINIR 300 MG/1
300 CAPSULE ORAL 2 TIMES DAILY
Qty: 20 CAPSULE | Refills: 0 | Status: SHIPPED | OUTPATIENT
Start: 2019-11-20 | End: 2019-11-30

## 2019-11-20 NOTE — PATIENT INSTRUCTIONS
Your blood pressure reading was elevated today.  Recommend that you have it re-checked either at home or at our clinic within a week  Avoid cold medicines that have pseudoephedrin in it, or Sudafed. You may take regular or plain Robitussin or Mucinex  If you are unsure, please ask the pharmacist    If your blood pressure top number (systolic) 180 and above, or the bottom number (diastolic) 120 and above, you need to be seen immediately.  If persistently elevated top number 140 and above, or the bottom number 90 and above, please schedule an appointment to see a provider in clinic within a week.  If you have very elevated blood pressures, accompanied by headache, chest pain, numbness, weakness, slurring of speech, confusion, difficulty walking, call 911 and go to the ER

## 2019-11-20 NOTE — PROGRESS NOTES
Chief complaint: cough and sinus congestion    Been going on for 2 weeks  Colds, sinus congestion, facial pain  Cough Yes  Greenish discharge  Fever No  Progressively getting worse: YES  Thought was getting better then started getting worse: YES  Getting better:  No  Exposure to pertussis or pertussis like symptoms: No    BP elevated today but asymptomatic. No headache no blurring of vision no chest pain no shortness of breath no dizziness no unsteadiness no numbness no weakness      Problem list and histories reviewed & adjusted, as indicated.  Additional history: as documented    Problem list, Medication list, Allergies, and Medical/Social/Surgical histories reviewed in [facility] and updated as appropriate.    ROS:  Constitutional, HEENT, cardiovascular, pulmonary, gi and gu systems are negative, except as otherwise noted.    OBJECTIVE:                                                    BP (!) 185/80   Pulse 85   Temp 98.2  F (36.8  C) (Oral)   Resp 18   Wt 104.3 kg (230 lb)   SpO2 95%   BMI 31.19 kg/m    Body mass index is 31.19 kg/m .   BP (!) 185/80   Pulse 85   Temp 98.2  F (36.8  C) (Oral)   Resp 18   Wt 104.3 kg (230 lb)   SpO2 95%   BMI 31.19 kg/m     GENERAL: healthy, alert and no distress  EYES: Eyes grossly normal to inspection, PERRL and conjunctivae and sclerae normal  HENT: ear canals and TM's normal, nose and mouth without ulcers or lesions  Sinuses: turbinates erythematous   NECK: no adenopathy, no asymmetry, masses, or scars and thyroid normal to palpation  RESP: lungs clear to auscultation - no rales, rhonchi or wheezes   CV: regular rate and rhythm, normal S1 S2, no S3 or S4, no murmur, click or rub, no peripheral edema and peripheral pulses strong  ABDOMEN: soft, nontender, no hepatosplenomegaly, no masses and bowel sounds normal  MS: no gross musculoskeletal defects noted, no edema  SKIN: no suspicious lesions or rashes  NEURO: Normal strength and tone, mentation intact and speech  normal  PSYCH: mentation appears normal, affect normal/bright    Diagnostic Test Results:  No results found for this or any previous visit (from the past 24 hour(s)).     ASSESSMENT/PLAN:                                                        ICD-10-CM    1. Acute sinusitis with symptoms > 10 days J01.90 cefdinir (OMNICEF) 300 MG capsule   2. Acute bronchitis with symptoms > 10 days J20.9 cefdinir (OMNICEF) 300 MG capsule   3. Essential hypertension I10    4. 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 Long-term (current) use of anticoagulants [Z79.01] Z79.01    6. Chronic atrial fibrillation I48.20      Prescribed with omnicef     BP elevated today - per patient he has white coat hypertension and is always normal at home - he states he will check it tonight at home when he gets home and takes his BP med    Recommend follow up with primary care provider if no relief ], sooner if worse  Adverse reactions of medications discussed.  Over the counter medications discussed.   Aware to come back in if with worsening symptoms or if no relief despite treatment plan  Patient voiced understanding and had no further questions.     Your blood pressure reading was elevated today.  Recommend that you have it re-checked either at home or at our clinic within a week  Avoid cold medicines that have pseudoephedrin in it, or Sudafed. You may take regular or plain Robitussin or Mucinex  If you are unsure, please ask the pharmacist    If your blood pressure top number (systolic) 180 and above, or the bottom number (diastolic) 120 and above, you need to be seen immediately.  If persistently elevated top number 140 and above, or the bottom number 90 and above, please schedule an appointment to see a provider in clinic within a week.  If you have very elevated blood pressures, accompanied by headache, chest pain, numbness, weakness, slurring of speech, confusion, difficulty  walking, call 911 and go to the ER    Please inform coumadin nurses of new medications dispensed with my recent office visit.   He will inform his RN     Continue follow up with oncology     MD Vandana Campbell MD  [facility]

## 2019-12-17 ENCOUNTER — ANTICOAGULATION THERAPY VISIT (OUTPATIENT)
Dept: NURSING | Facility: CLINIC | Age: 79
End: 2019-12-17
Payer: MEDICARE

## 2019-12-17 DIAGNOSIS — I48.20 CHRONIC ATRIAL FIBRILLATION (H): ICD-10-CM

## 2019-12-17 DIAGNOSIS — Z79.01 LONG TERM CURRENT USE OF ANTICOAGULANT THERAPY: ICD-10-CM

## 2019-12-17 LAB — INR POINT OF CARE: 3.4 (ref 0.86–1.14)

## 2019-12-17 PROCEDURE — 85610 PROTHROMBIN TIME: CPT | Mod: QW

## 2019-12-17 PROCEDURE — 36416 COLLJ CAPILLARY BLOOD SPEC: CPT

## 2019-12-17 PROCEDURE — 99207 ZZC NO CHARGE NURSE ONLY: CPT

## 2019-12-17 NOTE — PROGRESS NOTES
ANTICOAGULATION FOLLOW-UP CLINIC VISIT    Patient Name:  William Lechuga  Date:  12/17/2019  Contact Type:  Face to Face    SUBJECTIVE:  Patient Findings     Comments:   Has had a cold, but feeling better.        Clinical Outcomes     Comments:   Has had a cold, but feeling better.           OBJECTIVE    INR Protime   Date Value Ref Range Status   12/17/2019 3.4 (A) 0.86 - 1.14 Final       ASSESSMENT / PLAN  INR assessment SUPRA    Recheck INR In: 3 WEEKS due to holidays   INR Location Clinic      Anticoagulation Summary  As of 12/17/2019    INR goal:   2.0-3.0   TTR:   74.8 % (1 y)   INR used for dosing:   3.4! (12/17/2019)   Warfarin maintenance plan:   7.5 mg (5 mg x 1.5) every Mon; 5 mg (5 mg x 1) all other days   Full warfarin instructions:   12/18: Hold; Otherwise 7.5 mg every Mon; 5 mg all other days   Weekly warfarin total:   37.5 mg   Plan last modified:   Ariana Arriaga (3/29/2016)   Next INR check:   1/7/2020   Target end date:       Indications    Long-term (current) use of anticoagulants [Z79.01] [Z79.01]  Chronic atrial fibrillation [I48.20]             Anticoagulation Episode Summary     INR check location:       Preferred lab:       Send INR reminders to:   ADWOA BLOOD    Comments:         Anticoagulation Care Providers     Provider Role Specialty Phone number    Narcisa Mcmahon MD [facility] [phone]    Prakash Gonzalez MD [facility] [phone]            See the Encounter Report to view Anticoagulation Flowsheet and Dosing Calendar (Go to Encounters tab in chart review, and find the Anticoagulation Therapy Visit)    Patient took today's dose so will hold tomorrow's dose    AUDELIA DONALD

## 2020-01-08 ENCOUNTER — ANTICOAGULATION THERAPY VISIT (OUTPATIENT)
Dept: NURSING | Facility: CLINIC | Age: 80
End: 2020-01-08
Payer: MEDICARE

## 2020-01-08 ENCOUNTER — TELEPHONE (OUTPATIENT)
Dept: INTERNAL MEDICINE | Facility: CLINIC | Age: 80
End: 2020-01-08

## 2020-01-08 DIAGNOSIS — I48.20 CHRONIC ATRIAL FIBRILLATION (H): ICD-10-CM

## 2020-01-08 DIAGNOSIS — Z79.01 LONG TERM CURRENT USE OF ANTICOAGULANT THERAPY: ICD-10-CM

## 2020-01-08 LAB — INR POINT OF CARE: 3.6 (ref 0.86–1.14)

## 2020-01-08 PROCEDURE — 36416 COLLJ CAPILLARY BLOOD SPEC: CPT

## 2020-01-08 PROCEDURE — 99207 ZZC NO CHARGE NURSE ONLY: CPT

## 2020-01-08 PROCEDURE — 85610 PROTHROMBIN TIME: CPT | Mod: QW

## 2020-01-08 NOTE — PROGRESS NOTES
ANTICOAGULATION FOLLOW-UP CLINIC VISIT    Patient Name:  William Lechuga  Date:  1/8/2020  Contact Type:  Face to Face    SUBJECTIVE:  Patient Findings     Comments:   The patient was assessed for diet, medication, and activity level changes, missed or extra doses, bruising or bleeding, with no problem findings.  This is patients 2nd unknown high.   Will decrease maintenance to 5mg every day and have patient recheck INR in 2 weeks.   Patient will be out of town ([facility]) for the next couple months.   INR letter written for patient stating to have INR checked at clinic where he is staying.         Clinical Outcomes     Negatives:   Major bleeding event, Thromboembolic event, Anticoagulation-related hospital admission, Anticoagulation-related ED visit, Anticoagulation-related fatality    Comments:   The patient was assessed for diet, medication, and activity level changes, missed or extra doses, bruising or bleeding, with no problem findings.  This is patients 2nd unknown high.   Will decrease maintenance to 5mg every day and have patient recheck INR in 2 weeks.   Patient will be out of town ([facility]) for the next couple months.   INR letter written for patient stating to have INR checked at clinic where he is staying.            OBJECTIVE    INR Protime   Date Value Ref Range Status   01/08/2020 3.6 (A) 0.86 - 1.14 Final       ASSESSMENT / PLAN  INR assessment SUPRA    Recheck INR In: 2 WEEKS    INR Location Clinic      Anticoagulation Summary  As of 1/8/2020    INR goal:   2.0-3.0   TTR:   68.8 % (1 y)   INR used for dosing:   3.6! (1/8/2020)   Warfarin maintenance plan:   5 mg (5 mg x 1) every day   Full warfarin instructions:   1/9: Hold; Otherwise 5 mg every day   Weekly warfarin total:   35 mg   Plan last modified:   Nadine Fried, ASHU (1/8/2020)   Next INR check:   1/21/2020   Target end date:       Indications    Long-term (current) use of anticoagulants [Z79.01] [Z79.01]  Chronic atrial fibrillation  [I48.20]             Anticoagulation Episode Summary     INR check location:       Preferred lab:       Send INR reminders to:   ADWOA BLOOD    Comments:         Anticoagulation Care Providers     Provider Role Specialty Phone number    Narcisa Mcmahon MD [facility] [phone]    Prakash Gonzalez MD [facility] [phone]            See the Encounter Report to view Anticoagulation Flowsheet and Dosing Calendar (Go to Encounters tab in chart review, and find the Anticoagulation Therapy Visit)    Dosage adjustment made based on physician directed care plan.    Nadine Edmonds RN, BSN, PHN

## 2020-01-08 NOTE — TELEPHONE ENCOUNTER
Script printed and signed, ready for  at .  Patient notified and voiced understanding and agreement.  Arminda Liu RN

## 2020-01-08 NOTE — TELEPHONE ENCOUNTER
Patient was seen in INR today.   Patient will be leaving out of state for several months on Friday 1/10.    Patient requesting letter (like INR RN did last year) for him to get INR checks at a clinic where he stays.     Will route to INR RN that patient stated wrote this last year.     Patient will come into clinic to  before leaving on Friday 1/10.    Nadine Edmonds, RN, BSN, PHN

## 2020-01-24 ENCOUNTER — TELEPHONE (OUTPATIENT)
Dept: NURSING | Facility: CLINIC | Age: 80
End: 2020-01-24

## 2020-01-24 NOTE — TELEPHONE ENCOUNTER
Patient due to have INR check.  Have not received results.  Spoke with patient and per him he forgot orders when he left [place].  Daughter mailed order.  Patient stated he should get it later today.  Patient ended up having dental work/root canal when he first arrived.  Doing fine now, no increase in bruising, no bleeding noted.  Advised patient to get INR checked as soon as he gets orders.  Patent verbalized understanding.

## 2020-01-27 NOTE — TELEPHONE ENCOUNTER
RN spoke with patient.   Patient just received orders in mail and will have INR checked early this week at:     [facility]  Phone: [phone]    Results will be faxed to [facility] and INR RN to call with dosing instructions.     Patient had no questions/complaints at this time.   Will monitor for faxed INR result from [facility].    Nadine Edmonds RN, BSN, PHN

## 2020-01-28 ENCOUNTER — TRANSFERRED RECORDS (OUTPATIENT)
Dept: HEALTH INFORMATION MANAGEMENT | Facility: CLINIC | Age: 80
End: 2020-01-28

## 2020-01-28 LAB — INR PPP: 2.5 (ref 0.9–1.1)

## 2020-01-29 ENCOUNTER — ANTICOAGULATION THERAPY VISIT (OUTPATIENT)
Dept: INTERNAL MEDICINE | Facility: CLINIC | Age: 80
End: 2020-01-29
Payer: MEDICARE

## 2020-01-29 DIAGNOSIS — Z79.01 LONG TERM CURRENT USE OF ANTICOAGULANT THERAPY: ICD-10-CM

## 2020-01-29 DIAGNOSIS — I48.20 CHRONIC ATRIAL FIBRILLATION (H): ICD-10-CM

## 2020-01-29 LAB — INR POINT OF CARE: 2.5 (ref 0.86–1.14)

## 2020-01-29 PROCEDURE — 99207 ZZC NO CHARGE NURSE ONLY: CPT | Performed by: INTERNAL MEDICINE

## 2020-01-29 PROCEDURE — 36416 COLLJ CAPILLARY BLOOD SPEC: CPT | Performed by: INTERNAL MEDICINE

## 2020-01-29 PROCEDURE — 85610 PROTHROMBIN TIME: CPT | Mod: QW | Performed by: INTERNAL MEDICINE

## 2020-01-29 NOTE — PROGRESS NOTES
ANTICOAGULATION FOLLOW-UP CLINIC VISIT    Patient Name:  William Lechuga  Date:  2020  Contact Type:  Telephone    SUBJECTIVE:  Patient Findings     Comments:   The patient was assessed for diet, medication, and activity level changes, missed or extra doses, bruising or bleeding, with no problem findings.  Fax received from [facility] with INR results.         Clinical Outcomes     Negatives:   Major bleeding event, Thromboembolic event, Anticoagulation-related hospital admission, Anticoagulation-related ED visit, Anticoagulation-related fatality    Comments:   The patient was assessed for diet, medication, and activity level changes, missed or extra doses, bruising or bleeding, with no problem findings.  Fax received from [facility] with INR results.            OBJECTIVE    INR Protime   Date Value Ref Range Status   2020 2.5 (A) 0.86 - 1.14 Final       ASSESSMENT / PLAN  INR assessment THER    Recheck INR In: 4 WEEKS    INR Location Outside lab      Anticoagulation Summary  As of 2020    INR goal:   2.0-3.0   TTR:   65.6 % (1 y)   INR used for dosin.5 (2020)   Warfarin maintenance plan:   5 mg (5 mg x 1) every day   Full warfarin instructions:   5 mg every day   Weekly warfarin total:   35 mg   Plan last modified:   Nadine Fried RN (2020)   Next INR check:   2020   Priority:   Maintenance   Target end date:       Indications    Long-term (current) use of anticoagulants [Z79.01] [Z79.01]  Chronic atrial fibrillation [I48.20]             Anticoagulation Episode Summary     INR check location:       Preferred lab:       Send INR reminders to:   ADWOA BLOOD    Comments:         Anticoagulation Care Providers     Provider Role Specialty Phone number    Narcisa Mcmahon MD [facility] [phone]    Prakash Gonzalez MD [facility] Internal [facility] [phone]            See the Encounter Report to view Anticoagulation Flowsheet and Dosing Calendar (Go to  Encounters tab in chart review, and find the Anticoagulation Therapy Visit)    Dosage adjustment made based on physician directed care plan.    Nadine Edmonds, RN, BSN, PHN

## 2020-01-29 NOTE — TELEPHONE ENCOUNTER
Fax received - INR 2.5 on 1/28/2020.    RN to call patient and advise:    INR in goal range at 2.5, NO change to current dosing (5mg every day), recheck INR in 4 weeks at [place] [facility].     [facility] phones currently non functioning.     Nadine Edmonds, RN, BSN, PHN

## 2020-02-04 ENCOUNTER — TELEPHONE (OUTPATIENT)
Dept: INTERNAL MEDICINE | Facility: CLINIC | Age: 80
End: 2020-02-04

## 2020-02-04 NOTE — TELEPHONE ENCOUNTER
Panel Management Review          Composite cancer screening  Chart review shows that this patient is due/due soon for the following None  Summary:    Patient is due/failing the following:   BP check and PHYSICAL    Action needed:   Patient needs office visit for annual wellness.    Type of outreach:    Phone, spoke to patient.  he says he goes o [facility] [place] [facility] every year for his annual PE in May.  He is currently out of town for the next 2 months and is unable to come in for BP check    Questions for provider review:    None                                                                                                                                    VILMA Mendoza       Chart routed to Care Team .

## 2020-03-02 ENCOUNTER — TRANSFERRED RECORDS (OUTPATIENT)
Dept: HEALTH INFORMATION MANAGEMENT | Facility: CLINIC | Age: 80
End: 2020-03-02

## 2020-03-02 LAB — INR PPP: 2.1 (ref 0.9–1.1)

## 2020-03-03 ENCOUNTER — TELEPHONE (OUTPATIENT)
Dept: INTERNAL MEDICINE | Facility: CLINIC | Age: 80
End: 2020-03-03

## 2020-03-03 ENCOUNTER — ANTICOAGULATION THERAPY VISIT (OUTPATIENT)
Dept: INTERNAL MEDICINE | Facility: CLINIC | Age: 80
End: 2020-03-03
Payer: MEDICARE

## 2020-03-03 DIAGNOSIS — Z79.01 LONG TERM CURRENT USE OF ANTICOAGULANT THERAPY: ICD-10-CM

## 2020-03-03 DIAGNOSIS — I48.20 CHRONIC ATRIAL FIBRILLATION (H): ICD-10-CM

## 2020-03-03 LAB — INR POINT OF CARE: 2.1 (ref 0.86–1.14)

## 2020-03-03 PROCEDURE — 99207 ZZC NO CHARGE NURSE ONLY: CPT | Performed by: INTERNAL MEDICINE

## 2020-03-03 PROCEDURE — 36416 COLLJ CAPILLARY BLOOD SPEC: CPT | Performed by: INTERNAL MEDICINE

## 2020-03-03 PROCEDURE — 85610 PROTHROMBIN TIME: CPT | Mod: QW | Performed by: INTERNAL MEDICINE

## 2020-03-03 NOTE — TELEPHONE ENCOUNTER
Left message on voice mail for patient to call clinic. [phone]  Will continue same dose and recheck in 4 weeks  Received fax with results.  INR=2.1  Arminda Liu RN

## 2020-03-03 NOTE — PROGRESS NOTES
ANTICOAGULATION FOLLOW-UP CLINIC VISIT    Patient Name:  William Lechuga  Date:  3/3/2020  Contact Type:  Telephone/ William    SUBJECTIVE:  Patient Findings     Comments:   Denies any problems. Will be back by 3/31/20        Clinical Outcomes     Comments:   Denies any problems. Will be back by 3/31/20           OBJECTIVE    INR Protime   Date Value Ref Range Status   2020 2.1 (A) 0.86 - 1.14 Final       ASSESSMENT / PLAN  INR assessment THER    Recheck INR In: 4 WEEKS    INR Location Outside lab      Anticoagulation Summary  As of 3/3/2020    INR goal:   2.0-3.0   TTR:   65.8 % (1 y)   INR used for dosin.1 (3/3/2020)   Warfarin maintenance plan:   5 mg (5 mg x 1) every day   Full warfarin instructions:   5 mg every day   Weekly warfarin total:   35 mg   No change documented:   Arminda Liu   Plan last modified:   Nadine Fried, RN (2020)   Next INR check:   3/31/2020   Priority:   Maintenance   Target end date:       Indications    Long-term (current) use of anticoagulants [Z79.01] [Z79.01]  Chronic atrial fibrillation [I48.20]             Anticoagulation Episode Summary     INR check location:       Preferred lab:       Send INR reminders to:   ADWOA BLOOD    Comments:         Anticoagulation Care Providers     Provider Role Specialty Phone number    Narcisa Mcmahon MD Responsible Family Practice [phone]    Prakash Gonzalez MD Responsible Internal Medicine [phone]            See the Encounter Report to view Anticoagulation Flowsheet and Dosing Calendar (Go to Encounters tab in chart review, and find the Anticoagulation Therapy Visit)    Dosage adjustment made based on physician directed care plan.    ARMINDA LIU

## 2020-03-24 DIAGNOSIS — I48.20 CHRONIC ATRIAL FIBRILLATION (H): Primary | ICD-10-CM

## 2020-04-15 ENCOUNTER — ANTICOAGULATION THERAPY VISIT (OUTPATIENT)
Dept: INTERNAL MEDICINE | Facility: CLINIC | Age: 80
End: 2020-04-15

## 2020-04-15 ENCOUNTER — TELEPHONE (OUTPATIENT)
Dept: INTERNAL MEDICINE | Facility: CLINIC | Age: 80
End: 2020-04-15

## 2020-04-15 DIAGNOSIS — I48.20 CHRONIC ATRIAL FIBRILLATION (H): ICD-10-CM

## 2020-04-15 DIAGNOSIS — Z79.01 LONG TERM CURRENT USE OF ANTICOAGULANT THERAPY: ICD-10-CM

## 2020-04-15 DIAGNOSIS — I48.20 CHRONIC ATRIAL FIBRILLATION (H): Primary | ICD-10-CM

## 2020-04-15 LAB
CAPILLARY BLOOD COLLECTION: NORMAL
INR PPP: 2.3 (ref 0.86–1.14)

## 2020-04-15 PROCEDURE — 85610 PROTHROMBIN TIME: CPT | Performed by: INTERNAL MEDICINE

## 2020-04-15 PROCEDURE — 36416 COLLJ CAPILLARY BLOOD SPEC: CPT | Performed by: INTERNAL MEDICINE

## 2020-04-15 PROCEDURE — 99207 ZZC NO CHARGE NURSE ONLY: CPT | Performed by: INTERNAL MEDICINE

## 2020-04-15 NOTE — PROGRESS NOTES
ANTICOAGULATION FOLLOW-UP CLINIC VISIT    Patient Name:  William Lechuga  Date:  4/15/2020  Contact Type:  Telephone    SUBJECTIVE:  Patient Findings     Comments:   Spoke with patient, he denies any problems or concerns        Clinical Outcomes     Comments:   Spoke with patient, he denies any problems or concerns           OBJECTIVE    INR   Date Value Ref Range Status   04/15/2020 2.30 (H) 0.86 - 1.14 Final     Comment:     This test is intended for monitoring Coumadin therapy.  Results are not   accurate in patients with prolonged INR due to factor deficiency.         ASSESSMENT / PLAN  INR assessment THER    Recheck INR In: 6 WEEKS    INR Location Clinic      Anticoagulation Summary  As of 4/15/2020    INR goal:   2.0-3.0   TTR:   65.8 % (1 y)   INR used for dosin.30 (4/15/2020)   Warfarin maintenance plan:   5 mg (5 mg x 1) every day   Full warfarin instructions:   5 mg every day   Weekly warfarin total:   35 mg   No change documented:   Arminda Liu   Plan last modified:   Nadine Fried RN (2020)   Next INR check:   2020   Priority:   Maintenance   Target end date:       Indications    Long-term (current) use of anticoagulants [Z79.01] [Z79.01]  Chronic atrial fibrillation [I48.20]             Anticoagulation Episode Summary     INR check location:       Preferred lab:       Send INR reminders to:   ADWOA BLOOD    Comments:         Anticoagulation Care Providers     Provider Role Specialty Phone number    Narcisa Mcmahon MD Responsible Family Practice [phone]    Prakash Gonzalez MD Responsible Internal Medicine [phone]            See the Encounter Report to view Anticoagulation Flowsheet and Dosing Calendar (Go to Encounters tab in chart review, and find the Anticoagulation Therapy Visit)    Dosage adjustment made based on physician directed care plan.    ARMINDA LIU

## 2020-05-29 ENCOUNTER — TELEPHONE (OUTPATIENT)
Dept: INTERNAL MEDICINE | Facility: CLINIC | Age: 80
End: 2020-05-29

## 2020-05-29 NOTE — TELEPHONE ENCOUNTER
Patient due for INR check.  Spoke with patient and he is going to the [facility] on 6-3-20 and will be having his INR checked at appointment.  Will send reminder to call him on 6-4-20 for results.

## 2020-06-03 ENCOUNTER — TRANSFERRED RECORDS (OUTPATIENT)
Dept: HEALTH INFORMATION MANAGEMENT | Facility: CLINIC | Age: 80
End: 2020-06-03

## 2020-06-03 LAB — INR POINT OF CARE: 2.7 (ref 0.86–1.14)

## 2020-06-04 NOTE — TELEPHONE ENCOUNTER
Spoke with patient, he is still at [place] and will give the lab our fax number [phone] to have INR faxed to clinic. Otherwise he will drop off a copy of INR results tomorrow.  He reported INR=2.7.  Will do anticoag visit once we receive official results.  Arminda Liu RN

## 2020-06-08 ENCOUNTER — ANTICOAGULATION THERAPY VISIT (OUTPATIENT)
Dept: INTERNAL MEDICINE | Facility: CLINIC | Age: 80
End: 2020-06-08
Payer: MEDICARE

## 2020-06-08 DIAGNOSIS — I48.20 CHRONIC ATRIAL FIBRILLATION (H): ICD-10-CM

## 2020-06-08 DIAGNOSIS — Z79.01 LONG TERM CURRENT USE OF ANTICOAGULANT THERAPY: ICD-10-CM

## 2020-06-08 PROCEDURE — 99207 ZZC NO CHARGE NURSE ONLY: CPT | Performed by: INTERNAL MEDICINE

## 2020-06-08 PROCEDURE — 36416 COLLJ CAPILLARY BLOOD SPEC: CPT | Performed by: INTERNAL MEDICINE

## 2020-06-08 PROCEDURE — 85610 PROTHROMBIN TIME: CPT | Mod: QW | Performed by: INTERNAL MEDICINE

## 2020-06-08 NOTE — TELEPHONE ENCOUNTER
Spoke with patient and per him he dropped results off on Friday.  Found results.  See anticoagulation encounter from today 6-8-20   no

## 2020-06-08 NOTE — PROGRESS NOTES
ANTICOAGULATION FOLLOW-UP CLINIC VISIT    Patient Name:  William Lechuga  Date:  2020  Contact Type:  Telephone/ William Lechuga    SUBJECTIVE:  Patient Findings     Comments:   Patient had INR done at [facility] on 6-3-20 results 2.7.  Will continue with current maintenance and recheck INR in 4 weeks.  Patient read back instructions        Clinical Outcomes     Comments:   Patient had INR done at [facility] on 6-3-20 results 2.7.  Will continue with current maintenance and recheck INR in 4 weeks.  Patient read back instructions           OBJECTIVE    Recent labs: (last 7 days)     20  0929   INR 2.7*       ASSESSMENT / PLAN  INR assessment THER    Recheck INR In: 4 WEEKS having surgery on 7-15-20   INR Location Clinic      Anticoagulation Summary  As of 2020    INR goal:   2.0-3.0   TTR:   65.9 % (1 y)   INR used for dosin.7 (6/3/2020)   Warfarin maintenance plan:   5 mg (5 mg x 1) every day   Full warfarin instructions:   5 mg every day   Weekly warfarin total:   35 mg   No change documented:   Fartun Angulo RN   Plan last modified:   Nadine Fried RN (2020)   Next INR check:   2020   Priority:   Maintenance   Target end date:   Indefinite    Indications    Long-term (current) use of anticoagulants [Z79.01] [Z79.01]  Chronic atrial fibrillation [I48.20]             Anticoagulation Episode Summary     INR check location:       Preferred lab:       Send INR reminders to:   ADWOA BLOOD    Comments:         Anticoagulation Care Providers     Provider Role Specialty Phone number    Prakash Gonzalez MD Referring Internal Medicine [phone]    Nacrisa Mcmahon MD [facility] [phone]            See the Encounter Report to view Anticoagulation Flowsheet and Dosing Calendar (Go to Encounters tab in chart review, and find the Anticoagulation Therapy Visit)    Dosage adjustment made based on physician directed care plan.    Fartun Angulo RN

## 2020-07-06 DIAGNOSIS — R75 NONSPECIFIC SEROLOGIC EVIDENCE OF HUMAN IMMUNODEFICIENCY VIRUS (HIV): Primary | ICD-10-CM

## 2020-07-07 ENCOUNTER — ANTICOAGULATION THERAPY VISIT (OUTPATIENT)
Dept: INTERNAL MEDICINE | Facility: CLINIC | Age: 80
End: 2020-07-07

## 2020-07-07 DIAGNOSIS — I48.20 CHRONIC ATRIAL FIBRILLATION (H): ICD-10-CM

## 2020-07-07 DIAGNOSIS — Z79.01 LONG TERM CURRENT USE OF ANTICOAGULANT THERAPY: ICD-10-CM

## 2020-07-07 DIAGNOSIS — R75 NONSPECIFIC SEROLOGIC EVIDENCE OF HUMAN IMMUNODEFICIENCY VIRUS (HIV): ICD-10-CM

## 2020-07-07 LAB — INR PPP: 3 (ref 0.86–1.14)

## 2020-07-07 PROCEDURE — 87536 HIV-1 QUANT&REVRSE TRNSCRPJ: CPT | Performed by: INTERNAL MEDICINE

## 2020-07-07 PROCEDURE — 85610 PROTHROMBIN TIME: CPT | Performed by: INTERNAL MEDICINE

## 2020-07-07 PROCEDURE — 36415 COLL VENOUS BLD VENIPUNCTURE: CPT | Performed by: INTERNAL MEDICINE

## 2020-07-07 PROCEDURE — 99207 ZZC NO CHARGE NURSE ONLY: CPT | Performed by: INTERNAL MEDICINE

## 2020-07-07 NOTE — PROGRESS NOTES
ANTICOAGULATION FOLLOW-UP CLINIC VISIT    Patient Name:  William Lechuga  Date:  7/7/2020  Contact Type:  Telephone    SUBJECTIVE:  Patient Findings     Comments:   Spoke with patient, he denies any problems. He will have hip surgery at [place] on 7/15, surgeon gave patient hold orders, no bridging needed see visit note 6/19/20, patient voiced correct hold orders. Requests recheck on 7/20, but patient prefers 7/21. Will recheck INR on 7/21/20 as per patient preference.        Clinical Outcomes     Comments:   Spoke with patient, he denies any problems. He will have hip surgery at [place] on 7/15, surgeon gave patient hold orders, no bridging needed see visit note 6/19/20, patient voiced correct hold orders. Requests recheck on 7/20, but patient prefers 7/21. Will recheck INR on 7/21/20 as per patient preference.           OBJECTIVE    Recent labs: (last 7 days)     07/07/20  0830   INR 3.00*       ASSESSMENT / PLAN  INR assessment THER    Recheck INR In: 2 WEEKS    INR Location Clinic      Anticoagulation Summary  As of 7/7/2020    INR goal:   2.0-3.0   TTR:   74.3 % (1 y)   INR used for dosing:   3.00 (7/7/2020)   Warfarin maintenance plan:   5 mg (5 mg x 1) every day   Full warfarin instructions:   5 mg every day   Weekly warfarin total:   35 mg   No change documented:   Arminda Liu   Plan last modified:   Nadine Fried RN (1/8/2020)   Next INR check:   7/21/2020   Priority:   Maintenance   Target end date:   Indefinite    Indications    Long-term (current) use of anticoagulants [Z79.01] [Z79.01]  Chronic atrial fibrillation (H) [I48.20]             Anticoagulation Episode Summary     INR check location:       Preferred lab:       Send INR reminders to:   ADWOA BLOOD    Comments:         Anticoagulation Care Providers     Provider Role Specialty Phone number    Prakash Gonzalez MD Referring Internal Medicine [phone]    Narcisa Mcmahon MD Responsible Family Practice [phone]            See the  Encounter Report to view Anticoagulation Flowsheet and Dosing Calendar (Go to Encounters tab in chart review, and find the Anticoagulation Therapy Visit)    Dosage adjustment made based on physician directed care plan.    AUDELIA DONALD

## 2020-07-11 LAB
HIV1 RNA # PLAS NAA DL=20: NORMAL {COPIES}/ML
HIV1 RNA SERPL NAA+PROBE-LOG#: NORMAL {LOG_COPIES}/ML

## 2020-07-15 ENCOUNTER — TRANSFERRED RECORDS (OUTPATIENT)
Dept: HEALTH INFORMATION MANAGEMENT | Facility: CLINIC | Age: 80
End: 2020-07-15

## 2020-07-21 ENCOUNTER — ANTICOAGULATION THERAPY VISIT (OUTPATIENT)
Dept: INTERNAL MEDICINE | Facility: CLINIC | Age: 80
End: 2020-07-21

## 2020-07-21 DIAGNOSIS — I48.20 CHRONIC ATRIAL FIBRILLATION (H): ICD-10-CM

## 2020-07-21 DIAGNOSIS — Z79.01 LONG TERM CURRENT USE OF ANTICOAGULANT THERAPY: ICD-10-CM

## 2020-07-21 LAB
CAPILLARY BLOOD COLLECTION: NORMAL
INR PPP: 1.5 (ref 0.86–1.14)

## 2020-07-21 PROCEDURE — 85610 PROTHROMBIN TIME: CPT | Performed by: INTERNAL MEDICINE

## 2020-07-21 PROCEDURE — 36416 COLLJ CAPILLARY BLOOD SPEC: CPT | Performed by: INTERNAL MEDICINE

## 2020-07-21 PROCEDURE — 99207 ZZC NO CHARGE NURSE ONLY: CPT | Performed by: INTERNAL MEDICINE

## 2020-07-21 NOTE — PROGRESS NOTES
ANTICOAGULATION FOLLOW-UP CLINIC VISIT    Patient Name:  William Lechuga  Date:  2020  Contact Type:  Telephone    SUBJECTIVE:  Patient Findings     Comments:   Spoke with patient, he is doing well after hip surgery 7/15/20, he has not had a full 7 days of warfarin, will give 2 bumps and recheck in 1 week. He does have a f/u with provider on . He will be having cataract surgery on , but no holds needed. He voiced understanding with dosing directions        Clinical Outcomes     Comments:   Spoke with patient, he is doing well after hip surgery 7/15/20, he has not had a full 7 days of warfarin, will give 2 bumps and recheck in 1 week. He does have a f/u with provider on . He will be having cataract surgery on , but no holds needed. He voiced understanding with dosing directions           OBJECTIVE    Recent labs: (last 7 days)     20  0907   INR 1.50*       ASSESSMENT / PLAN  INR assessment SUB intentional hold   Recheck INR In: 1 WEEK    INR Location Clinic      Anticoagulation Summary  As of 2020    INR goal:   2.0-3.0   TTR:   76.9 % (1 y)   INR used for dosin.50! (2020)   Warfarin maintenance plan:   5 mg (5 mg x 1) every day   Full warfarin instructions:   : 7.5 mg; : 7.5 mg; Otherwise 5 mg every day   Weekly warfarin total:   35 mg   Plan last modified:   Nadine Fried RN (2020)   Next INR check:   2020   Priority:   Maintenance   Target end date:   Indefinite    Indications    Long-term (current) use of anticoagulants [Z79.01] [Z79.01]  Chronic atrial fibrillation (H) [I48.20]             Anticoagulation Episode Summary     INR check location:       Preferred lab:       Send INR reminders to:   ADWOA BLOOD    Comments:         Anticoagulation Care Providers     Provider Role Specialty Phone number    Prakash Gonzalez MD Referring Internal Medicine [phone]    Narcisa Mcmahon MD Responsible Family Practice [phone]            See the  Encounter Report to view Anticoagulation Flowsheet and Dosing Calendar (Go to Encounters tab in chart review, and find the Anticoagulation Therapy Visit)    Dosage adjustment made based on physician directed care plan.    AUDELIA DONALD

## 2020-07-22 ENCOUNTER — OFFICE VISIT (OUTPATIENT)
Dept: FAMILY MEDICINE | Facility: CLINIC | Age: 80
End: 2020-07-22
Payer: MEDICARE

## 2020-07-22 VITALS
BODY MASS INDEX: 31.76 KG/M2 | WEIGHT: 234.2 LBS | TEMPERATURE: 98.2 F | RESPIRATION RATE: 18 BRPM | OXYGEN SATURATION: 99 % | HEART RATE: 91 BPM | DIASTOLIC BLOOD PRESSURE: 99 MMHG | SYSTOLIC BLOOD PRESSURE: 166 MMHG

## 2020-07-22 DIAGNOSIS — Z79.01 LONG TERM CURRENT USE OF ANTICOAGULANT THERAPY: ICD-10-CM

## 2020-07-22 DIAGNOSIS — Z96.641 STATUS POST TOTAL REPLACEMENT OF RIGHT HIP: ICD-10-CM

## 2020-07-22 DIAGNOSIS — E87.1 HYPONATREMIA: ICD-10-CM

## 2020-07-22 DIAGNOSIS — I48.20 CHRONIC ATRIAL FIBRILLATION (H): ICD-10-CM

## 2020-07-22 DIAGNOSIS — I10 HYPERTENSION GOAL BP (BLOOD PRESSURE) < 140/90: Primary | ICD-10-CM

## 2020-07-22 LAB
ANION GAP SERPL CALCULATED.3IONS-SCNC: 8 MMOL/L (ref 3–14)
BUN SERPL-MCNC: 19 MG/DL (ref 7–30)
CALCIUM SERPL-MCNC: 8.6 MG/DL (ref 8.5–10.1)
CHLORIDE SERPL-SCNC: 97 MMOL/L (ref 94–109)
CO2 SERPL-SCNC: 26 MMOL/L (ref 20–32)
CREAT SERPL-MCNC: 0.91 MG/DL (ref 0.66–1.25)
GFR SERPL CREATININE-BSD FRML MDRD: 80 ML/MIN/{1.73_M2}
GLUCOSE SERPL-MCNC: 98 MG/DL (ref 70–99)
POTASSIUM SERPL-SCNC: 4.4 MMOL/L (ref 3.4–5.3)
SODIUM SERPL-SCNC: 131 MMOL/L (ref 133–144)

## 2020-07-22 PROCEDURE — 36415 COLL VENOUS BLD VENIPUNCTURE: CPT | Performed by: FAMILY MEDICINE

## 2020-07-22 PROCEDURE — 99214 OFFICE O/P EST MOD 30 MIN: CPT | Performed by: FAMILY MEDICINE

## 2020-07-22 PROCEDURE — 80048 BASIC METABOLIC PNL TOTAL CA: CPT | Performed by: FAMILY MEDICINE

## 2020-07-22 RX ORDER — CARVEDILOL 3.12 MG/1
3.12 TABLET ORAL 2 TIMES DAILY WITH MEALS
Qty: 60 TABLET | Refills: 1 | Status: SHIPPED | OUTPATIENT
Start: 2020-07-22 | End: 2020-07-28

## 2020-07-22 NOTE — PROGRESS NOTES
Subjective     William Lechuga is a 79 year old male who presents to clinic today for the following health issues:    HPI       Hospital Follow-up Visit:    Hospital/Nursing Home/IP Rehab Facility: [facility]   Date of Admission: 7/15/20  Date of Discharge: 4/17/20  Reason(s) for Admission: Right hip Osteoarthritis (Primary Dx); with Difficulty Walking Orthopedic Hip Cause. S/p Right Total hip replacement on 7/15/2020 at the [facility].      Was your hospitalization related to COVID-19? No   Problems taking medications regularly:  None  Medication changes since discharge: Hydrochlorothiazide was stopped in the hospital x1 weeks ago due to hyponatremia with sodium in the 120s. Would like to discuss Coreg for BP instead of metoprolol.  States that he was taking Coreg while in the hospital last week and noticed improvements in BP readings.   Has an underlying history of Atrial Fibrillation- currently on rate control (Metoprolol) and anticoagulation with Coumadin    Problems adhering to non-medication therapy:  None    Summary of hospitalization:  CareEverywhere information obtained and reviewed  Diagnostic Tests/Treatments reviewed.  Follow up needed: none  Other Healthcare Providers Involved in Patient s Care:         None  Update since discharge: improved.   Post Discharge Medication Reconciliation: discharge medications reconciled and changed, per note/orders.  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 at [facility]     NIELS (obstructive sleep apnea)- moderate (AHI 15)     Long-term (current) use of anticoagulants [Z79.01]     Past Surgical History:   Procedure Laterality Date     HC THYROIDECTOMY  2005    Hurthle cell cancer, total thyroidectomy      LAMINECT/DISCECTOMY, LUMBAR  1999    lumbar micordiskectomy (L4-5)     TONSILLECTOMY & ADENOIDECTOMY  AGE 8       Social History     Tobacco Use     Smoking status: Never Smoker     Smokeless tobacco: Never Used   Substance Use Topics     Alcohol use: Yes     Alcohol/week: 0.0 standard drinks     Comment: beer     Family History   Problem Relation Age of Onset     Asthma Brother      Coronary Artery Disease Brother      Unknown/Adopted Daughter      Diabetes Mother      Diabetes Brother         d age 78         Current Outpatient Medications   Medication Sig Dispense Refill     carvedilol (COREG) 3.125 MG tablet Take 1 tablet (3.125 mg) by mouth 2 times daily (with meals) 60 tablet 1     Cholecalciferol (VITAMIN D3 PO) Take 500 Units by mouth daily        ipratropium (ATROVENT) 0.06 % spray        levothyroxine (SYNTHROID, LEVOTHROID) 137 MCG tablet 137 mcg daily       lisinopril (PRINIVIL/ZESTRIL) 40 MG tablet Take 1 tablet (40 mg) by mouth daily 90 tablet 3     warfarin (COUMADIN) 5 MG tablet Take 1.5 tablets Mondays and Thursdays and 1 tablet all other days of the week or as directed       order for DME INR to be checked the week of January 20, 2020 and every 2 - 6 weeks prn as directed by INR clinic    Please fax results to [facility] at: [phone] 1 each 11     order for DME please draw INR the week of February 19-23. Please fax results to [phone]. [facility] INR clinic.   phone number [phone] 1 each 1     rosuvastatin (CRESTOR) 10 MG tablet Take 10 mg by mouth       No Known Allergies    Reviewed and updated as needed this visit by Provider         Review of Systems   Constitutional, HEENT, cardiovascular, pulmonary, gi and gu systems are negative, except as otherwise noted.      Objective    BP (!) 166/99   Pulse 91   Temp 98.2  F (36.8  C) (Tympanic)   Resp 18   Wt 106.2 kg (234 lb 3.2 oz)   SpO2 99%   BMI 31.76 kg/m    Body mass index is 31.76 kg/m .  Physical Exam    GENERAL: healthy, alert and no distress  NECK: no adenopathy, no asymmetry, masses, or scars and thyroid normal to palpation  RESP: lungs clear to auscultation - no rales, rhonchi or wheezes  CV: regular rate and rhythm, normal S1 S2, no S3 or S4, no murmur, click or rub, no peripheral edema and peripheral pulses strong  SKIN: Dressing over the right hip still in place. This was partially removed- noted surgical site appeared clean, dry and intact.    Diagnostic Test Results:  Labs reviewed in Epic        Assessment & Plan     William was seen today for hospital f/u.    Diagnoses and all orders for this visit:    Hypertension goal BP (blood pressure) < 140/90, uncontrolled        -     Reports that while he was in the hospital he's BP was better controlled on Coreg than Metoprolol, wishes to switch.   -     Discontinue Metoprolol  -     Start: carvedilol (COREG) 3.125 MG tablet; Take 1 tablet (3.125 mg) by mouth 2 times daily (with meals); encouraged to keep a BP log at home.   -     Basic metabolic panel    Hyponatremia  -  Hydrochlorothiazide recently discontinued.     -  Basic metabolic panel    Status post total replacement of right hip on 7/15/2020 at the [facility]       -   Doing well, pain is controlled. Ambulating with a cane as needed.    Chronic atrial fibrillation (H) on Long-term (current) use of anticoagulants [Z79.01]      -  Continue enrollment in the Coumadin Clinic.      Return in about 6 days (around 7/28/2020) for BP Recheck and Pre-op.    Arelis Alexandra MD  [facility]

## 2020-07-28 ENCOUNTER — OFFICE VISIT (OUTPATIENT)
Dept: FAMILY MEDICINE | Facility: CLINIC | Age: 80
End: 2020-07-28
Payer: MEDICARE

## 2020-07-28 ENCOUNTER — ANTICOAGULATION THERAPY VISIT (OUTPATIENT)
Dept: INTERNAL MEDICINE | Facility: CLINIC | Age: 80
End: 2020-07-28

## 2020-07-28 VITALS
RESPIRATION RATE: 16 BRPM | HEART RATE: 85 BPM | BODY MASS INDEX: 31.22 KG/M2 | OXYGEN SATURATION: 99 % | DIASTOLIC BLOOD PRESSURE: 100 MMHG | WEIGHT: 230.2 LBS | TEMPERATURE: 97.5 F | SYSTOLIC BLOOD PRESSURE: 178 MMHG

## 2020-07-28 DIAGNOSIS — I48.20 CHRONIC ATRIAL FIBRILLATION (H): ICD-10-CM

## 2020-07-28 DIAGNOSIS — I10 HYPERTENSION GOAL BP (BLOOD PRESSURE) < 140/90: ICD-10-CM

## 2020-07-28 DIAGNOSIS — Z01.818 PREOP GENERAL PHYSICAL EXAM: Primary | ICD-10-CM

## 2020-07-28 DIAGNOSIS — Z79.01 LONG TERM CURRENT USE OF ANTICOAGULANT THERAPY: ICD-10-CM

## 2020-07-28 DIAGNOSIS — H26.9 CATARACT OF LEFT EYE, UNSPECIFIED CATARACT TYPE: ICD-10-CM

## 2020-07-28 LAB
CAPILLARY BLOOD COLLECTION: NORMAL
INR PPP: 2.3 (ref 0.86–1.14)

## 2020-07-28 PROCEDURE — 36416 COLLJ CAPILLARY BLOOD SPEC: CPT | Performed by: INTERNAL MEDICINE

## 2020-07-28 PROCEDURE — 99214 OFFICE O/P EST MOD 30 MIN: CPT | Performed by: FAMILY MEDICINE

## 2020-07-28 PROCEDURE — 85610 PROTHROMBIN TIME: CPT | Performed by: INTERNAL MEDICINE

## 2020-07-28 PROCEDURE — 99207 ZZC NO CHARGE NURSE ONLY: CPT | Performed by: INTERNAL MEDICINE

## 2020-07-28 RX ORDER — CARVEDILOL 6.25 MG/1
6.25 TABLET ORAL 2 TIMES DAILY WITH MEALS
Qty: 60 TABLET | Refills: 1 | Status: SHIPPED | OUTPATIENT
Start: 2020-07-28 | End: 2020-08-03

## 2020-07-28 NOTE — PROGRESS NOTES
ANTICOAGULATION FOLLOW-UP CLINIC VISIT    Patient Name:  William Lechuga  Date:  2020  Contact Type:  Telephone    SUBJECTIVE:  Patient Findings     Comments:   Spoke with patient, he denies any problems or concerns. Has pre op today for cataract sx tomorrow. Per patient no holds needed        Clinical Outcomes     Comments:   Spoke with patient, he denies any problems or concerns. Has pre op today for cataract sx tomorrow. Per patient no holds needed           OBJECTIVE    Recent labs: (last 7 days)     20  0959   INR 2.30*       ASSESSMENT / PLAN  INR assessment THER    Recheck INR In: 4 WEEKS    INR Location Clinic      Anticoagulation Summary  As of 2020    INR goal:   2.0-3.0   TTR:   77.6 % (1 y)   INR used for dosin.30 (2020)   Warfarin maintenance plan:   5 mg (5 mg x 1) every day   Full warfarin instructions:   5 mg every day   Weekly warfarin total:   35 mg   No change documented:   Arminda Liu   Plan last modified:   Nadine Fried RN (2020)   Next INR check:   2020   Priority:   Maintenance   Target end date:   Indefinite    Indications    Long-term (current) use of anticoagulants [Z79.01] [Z79.01]  Chronic atrial fibrillation (H) [I48.20]             Anticoagulation Episode Summary     INR check location:       Preferred lab:       Send INR reminders to:   ADWOA BLOOD    Comments:         Anticoagulation Care Providers     Provider Role Specialty Phone number    Prakash Gonzalez MD Referring Internal Medicine [phone]    Narcisa Mcmahon MD [facility] [phone]            See the Encounter Report to view Anticoagulation Flowsheet and Dosing Calendar (Go to Encounters tab in chart review, and find the Anticoagulation Therapy Visit)    Dosage adjustment made based on physician directed care plan.    ARMINDA LIU

## 2020-07-28 NOTE — PROGRESS NOTES
[facility]  [address]  [place] [address]  [phone]  Dept: [phone]    PRE-OP EVALUATION:  Today's date: 2020    William Lechuga (: 1940) presents for pre-operative evaluation assessment as requested by Dr. Dowell.  He requires evaluation and anesthesia risk assessment prior to undergoing surgery/procedure for treatment of Bilateral cataracts - left first on  and the right on     Fax number for surgical facility: [phone]  Primary Physician: Prakash Gonzalez  Type of Anesthesia Anticipated: General    Preop Questionnnaire:  Pre-op Questionnaire 2020   Surgery Location: [facility] [address] [place] [address]   Surgeon: Dr Benja Dowell   Surgery/Procedure: Cataract Removal with Implantation of an Intraocular Lens   Surgery Date: 2020   Time of Surgery: 7:40 a,m.   Where patient plans to recover: At home with family   Have you ever had a heart attack or stroke? No   Have you ever had surgery on your heart or blood vessels, such as a stent placement, a coronary artery bypass, or surgery on an artery in your head, neck, heart, or legs? No   Do you have chest pain with activity? No   Do you have a history of  heart failure? No   Do you currently have a cold, bronchitis or symptoms of other infection? No   Do you have a cough, shortness of breath, or wheezing? No   Do you or anyone in your family have previous history of blood clots? No   Do you or does anyone in your family have a serious bleeding problem such as prolonged bleeding following surgeries or cuts? No   Have you ever had problems with anemia or been told to take iron pills? No   Have you had any abnormal blood loss such as black, tarry or bloody stools? No   Have you ever had a blood transfusion? No   Are you willing to have a blood transfusion if it is medically needed before, during, or after your surgery? Yes   Have you or any of your relatives ever had  problems with anesthesia? No   Do you have sleep apnea, excessive snoring or daytime drowsiness? YES - has sleep apnea   Do you have a CPAP machine? Yes   Do you have any artifical heart valves or other implanted medical devices like a pacemaker, defibrillator, or continuous glucose monitor? No   Do you have artificial joints? YES - bilateral hips    Are you allergic to latex? No         HPI:     HPI related to upcoming procedure:     79 year old pleasant male here for a Pre-op exam.   Has a known history of bilateral cataracts, scheduled to have the left one extracted tomorrow and the right one on 8/18/2020.   States that he understands the risks and benefits of the procedure and wishes to proceed.     Reports having no concerns today. Feeling well.     Recently had a right hip replacement at the [facility] on 7/15- healing well. Pain is controlled and ambulating without any ambulatory device.         A-FIB - Patient has a longstanding history of chronic A-fib currently on rate control. Current treatment regimen includes Warfarin for stroke prevention and denies significant symptoms of lightheadedness, palpitations or dyspnea.   Recent INR therapeutic  INR   Date Value Ref Range Status   07/28/2020 2.30 (H) 0.86 - 1.14 Final     Comment:     This test is intended for monitoring Coumadin therapy.  Results are not   accurate in patients with prolonged INR due to factor deficiency.              MEDICAL HISTORY:     Patient Active Problem List    Diagnosis Date Noted     Long-term (current) use of anticoagulants [Z79.01] 03/29/2016     Priority: Medium     NIELS (obstructive sleep apnea)- moderate (AHI 15) 11/02/2015     Priority: Medium     Study Date: 10/29/2015- (224.0 lbs) apnea/hypopnea index was 15.9 events per hour.  The REM AHI was 20.3 events per hour.  The supine AHI was 5.7 events per hour. Lowest oxygen saturation was 80.3%.  Time spent below 89% was 25.4 minutes. CPAP optimal pressure was 5 cmH2O with an  AHI of 0 events per hour.  Time in REM supine on final pressure was 0 minutes. During the diagnostic portion of the study, PLM index was 22.8 movements per hour. During the treatment portion of the study, there were 0 PLMs recorded.        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        Obesity 10/22/2015     Priority: Medium     Hyperlipidemia LDL goal <130 10/31/2010     Priority: Medium     Chronic atrial fibrillation (H)      Priority: Medium     Hypertension      Priority: Medium     Impaired fasting glucose      Priority: Medium     Hypothyroid      Priority: Medium      Past Medical History:   Diagnosis Date     Acquired cataract      Chronic atrial fibrillation (H)      Current use of long term anticoagulation      Hemorrhoids      Hypertension, goal below 140/90      Past Surgical History:   Procedure Laterality Date     HC THYROIDECTOMY  2005    Hurthle cell cancer, total thyroidectomy     LAMINECT/DISCECTOMY, LUMBAR  1999    lumbar micordiskectomy (L4-5)     TONSILLECTOMY & ADENOIDECTOMY  AGE 8     Current Outpatient Medications   Medication Sig Dispense Refill     carvedilol (COREG) 6.25 MG tablet Take 1 tablet (6.25 mg) by mouth 2 times daily (with meals) 60 tablet 1     Cholecalciferol (VITAMIN D3 PO) Take 500 Units by mouth daily        ipratropium (ATROVENT) 0.06 % spray        levothyroxine (SYNTHROID, LEVOTHROID) 137 MCG tablet 137 mcg daily       lisinopril (PRINIVIL/ZESTRIL) 40 MG tablet Take 1 tablet (40 mg) by mouth daily 90 tablet 3     rosuvastatin (CRESTOR) 10 MG tablet Take 10 mg by mouth       warfarin (COUMADIN) 5 MG tablet Take 1.5 tablets Mondays and Thursdays and 1 tablet all other days of the week or as directed       order for DME INR to be checked the week of January 20, 2020 and every 2 - 6 weeks prn as directed by INR clinic    Please fax  results to [place] [facility] at: [phone] 1 each 11     order for DME please draw INR the week of February 19-23. Please fax results to [phone]. [facility].   phone number [phone] 1 each 1     OTC products: None, except as noted above    No Known Allergies   Latex Allergy: NO    Social History     Tobacco Use     Smoking status: Never Smoker     Smokeless tobacco: Never Used   Substance Use Topics     Alcohol use: Yes     Alcohol/week: 0.0 standard drinks     Comment: beer     History   Drug Use No       REVIEW OF SYSTEMS:   Constitutional, neuro, ENT, endocrine, pulmonary, cardiac, gastrointestinal, genitourinary, musculoskeletal, integument and psychiatric systems are negative, except as otherwise noted.    EXAM:   BP (!) 178/100   Pulse 85   Temp 97.5  F (36.4  C) (Tympanic)   Resp 16   Wt 104.4 kg (230 lb 3.2 oz)   SpO2 99%   BMI 31.22 kg/m      GENERAL APPEARANCE: healthy, alert and no distress     EYES: EOMI,  PERRL     HENT: ear canals and TM's normal and nose and mouth without ulcers or lesions     NECK: no adenopathy, no asymmetry, masses, or scars and thyroid normal to palpation     RESP: lungs clear to auscultation - no rales, rhonchi or wheezes     CV: regular rates and rhythm, normal S1 S2, no S3 or S4 and no murmur, click or rub     ABDOMEN:  soft, nontender, no HSM or masses and bowel sounds normal     MS: extremities normal- no gross deformities noted, no evidence of inflammation in joints, FROM in all extremities.     SKIN: no suspicious lesions or rashes. Right hip surgical site appears clean, dry and intact.      NEURO: Normal strength and tone, sensory exam grossly normal, mentation intact and speech normal     PSYCH: mentation appears normal. and affect normal/bright     LYMPHATICS: No cervical adenopathy    DIAGNOSTICS:   No labs or EKG required for low risk surgery (cataract, skin procedure, breast biopsy, etc)    Recent Labs   Lab Test 07/22/20  9406  07/21/20  0907 07/07/20  0830  10/19/18  0907   INR  --  1.50* 3.00*   < >  --    *  --   --   --  132*   POTASSIUM 4.4  --   --   --  4.8   CR 0.91  --   --   --  0.99    < > = values in this interval not displayed.        IMPRESSION:   Reason for surgery/procedure: Cataract Extraction.   Diagnosis/reason for consult: Bilateral Cataracts.    The proposed surgical procedure is considered LOW risk.    REVISED CARDIAC RISK INDEX  The patient has the following serious cardiovascular risks for perioperative complications such as (MI, PE, VFib and 3  AV Block):  No serious cardiac risks  INTERPRETATION: 0 risks: Class I (very low risk - 0.4% complication rate)    The patient has the following additional risks for perioperative complications:  No identified additional risks      ICD-10-CM    1. Preop general physical exam  Z01.818    2. Cataract of left eye, unspecified cataract type  H26.9    3. Hypertension goal BP (blood pressure) < 140/90 , uncontrolled I10 Increase dose: carvedilol (COREG) 6.25 MG tablet BID       RECOMMENDATIONS:       Cardiovascular Risk  Patient is already on a Beta Blocker. Continue Betablocker therapy after surgery, using Beta blocker order set as necessary for NPO status.      Pulmonary Risk  None identified.       Obstructive Sleep Apnea (or suspected sleep apnea)  Patient is clearly advised to use their home CPAP when released from surgery      --Patient is to take all scheduled medications on the day of surgery    APPROVAL GIVEN to proceed with proposed procedure, without further diagnostic evaluation       Signed Electronically by: Arelis Alexandra MD    Copy of this evaluation report is provided to requesting physician.    [facility] Preop Guidelines    Revised Cardiac Risk Index

## 2020-07-28 NOTE — PATIENT INSTRUCTIONS
Before Your Surgery      Call your surgeon if there is any change in your health. This includes signs of a cold or flu (such as a sore throat, runny nose, cough, rash or fever).    Do not smoke, drink alcohol or take over the counter medicine (unless your surgeon or primary care doctor tells you to) for the 24 hours before and after surgery.    If you take prescribed drugs: Follow your doctor s orders about which medicines to take and which to stop until after surgery.    Eating and drinking prior to surgery: follow the instructions from your surgeon    Take a shower or bath the night before surgery. Use the soap your surgeon gave you to gently clean your skin. If you do not have soap from your surgeon, use your regular soap. Do not shave or scrub the surgery site.  Wear clean pajamas and have clean sheets on your bed.     Pre-operative medication management   1.. May take your regular  medications the morning of surgery with small sips of water.

## 2020-08-01 ENCOUNTER — TELEPHONE (OUTPATIENT)
Dept: FAMILY MEDICINE | Facility: CLINIC | Age: 80
End: 2020-08-01

## 2020-08-01 ENCOUNTER — NURSE TRIAGE (OUTPATIENT)
Dept: NURSING | Facility: CLINIC | Age: 80
End: 2020-08-01

## 2020-08-01 DIAGNOSIS — I10 UNCONTROLLED HYPERTENSION: Primary | ICD-10-CM

## 2020-08-01 RX ORDER — AMLODIPINE BESYLATE 5 MG/1
5 TABLET ORAL DAILY
Qty: 30 TABLET | Refills: 0 | Status: SHIPPED | OUTPATIENT
Start: 2020-08-01 | End: 2020-08-14

## 2020-08-01 NOTE — TELEPHONE ENCOUNTER
Pt calls in with concern of continued high BP readings >    Last 2 today were >  172/101 - HR  80  187/90 - Hr 74    Pt with NO symptoms   No HA   No vision changes  No weakness  No nausea      See note > 8/1/2020  saw her on 7/22, switched to carvedilol, then 7/28 increased dosage, had cataracts surgery, BP took a jump up the next day, given eye drops containing prednisone, today 185/100. Patient very concerned.      Paging  called for [facility]  On hold for a LONG time -- finally got thru   on call is a Dr Rodney Magaña    Who talked to me directly - explained situation   Above MD says he will look in chart and call pt back directly ( 1:00 pm)    Pt called back and given the above information    Protocol and care advice reviewed  Caller states understanding of the recommended disposition  Advised to call back if further questions or concerns    Manjeet Negro , RN / [facility]              Additional Information    Negative: Difficult to awaken or acting confused (e.g., disoriented, slurred speech)    Negative: Severe difficulty breathing (e.g., struggling for each breath, speaks in single words)    Negative: [1] Weakness of the face, arm or leg on one side of the body AND [2] new onset    Negative: [1] Numbness (i.e., loss of sensation) of the face, arm or leg on one side of the body AND [2] new onset    Negative: [1] Chest pain lasts > 5 minutes AND [2] history of heart disease  (i.e., heart attack, bypass surgery, angina, angioplasty, CHF)    Negative: [1] Chest pain AND [2] took nitrogylcerin AND [3] pain was not relieved    Negative: Sounds like a life-threatening emergency to the triager    Negative: [1] Systolic BP  >= 160 OR Diastolic >= 100 AND [2] cardiac or neurologic symptoms (e.g., chest pain, difficulty breathing, unsteady gait, blurred vision)    Negative: [1] Pregnant > 20 weeks (or postpartum < 6 weeks) AND [2] new hand or face swelling    Negative: [1] Pregnant > 20  weeks AND [2] BP Systolic BP  >= 140 OR Diastolic >= 90    Negative: [1] Systolic BP  >= 200 OR Diastolic >= 120  AND [2] having NO cardiac or neurologic symptoms    Negative: [1] Postpartum < 6 weeks AND [2] BP Systolic BP  >= 140 OR Diastolic >= 90    Negative: [1] Systolic BP  >= 180 OR Diastolic >= 110 AND [2] missed most recent dose of blood pressure medication    Systolic BP  >= 180 OR Diastolic >= 110    Protocols used: HIGH BLOOD PRESSURE-A-AH

## 2020-08-01 NOTE — PROGRESS NOTES
Received call regarding elevated pressures 180's-190's/100-110 without symptoms.  Patient has a history of HTN, a fib, hyponatremia with thiazide use, currently taking coreg BID, lisinopril, taking meds as prescribed.  Discussed situation with patient directly over the phone, amlodipine 5mg sent to pharmacy, patient to follow-up with primary care provider early next week, ED if no improvement/worsening pressure/becomes symptomatic.    Rodney Linn MD

## 2020-08-01 NOTE — TELEPHONE ENCOUNTER
Reason for call:  Other   Patient called regarding (reason for call): call back  Additional comments: Per changed medication for better  control, saw her on 7/22, switched to carvedilol, then 7/28 increased dosage, had cataracs surgery, bp took a jump up the next day, given eye drops containing prednisone, today 185/100. Patient very concerned.    Phone number to reach patient:  Cell number on file:    Telephone Information:   Mobile [phone]       Best Time:  Anytime    Can we leave a detailed message on this number?  YES    Travel screening: Not Applicable

## 2020-08-03 ENCOUNTER — OFFICE VISIT (OUTPATIENT)
Dept: FAMILY MEDICINE | Facility: CLINIC | Age: 80
End: 2020-08-03
Payer: MEDICARE

## 2020-08-03 VITALS
DIASTOLIC BLOOD PRESSURE: 80 MMHG | SYSTOLIC BLOOD PRESSURE: 155 MMHG | HEART RATE: 89 BPM | BODY MASS INDEX: 31.19 KG/M2 | WEIGHT: 230 LBS | TEMPERATURE: 97 F

## 2020-08-03 DIAGNOSIS — I10 HYPERTENSION GOAL BP (BLOOD PRESSURE) < 140/90: Primary | ICD-10-CM

## 2020-08-03 PROCEDURE — 99213 OFFICE O/P EST LOW 20 MIN: CPT | Performed by: FAMILY MEDICINE

## 2020-08-03 RX ORDER — CARVEDILOL 12.5 MG/1
12.5 TABLET ORAL 2 TIMES DAILY WITH MEALS
Qty: 180 TABLET | Refills: 3 | Status: SHIPPED | OUTPATIENT
Start: 2020-08-03 | End: 2020-08-14

## 2020-08-03 NOTE — PATIENT INSTRUCTIONS
Hold Amlodipine.   Increase the Coreg- Finish up what you have left of the Coreg by taking #2 (6.25 mg) twice daily with meals and when you  new Rx and take as prescribed.

## 2020-08-03 NOTE — PROGRESS NOTES
Subjective     William Lechuga is a 79 year old male who presents to clinic today for the following health issues:    HPI       Hypertension Follow-up  Currently on Lisinopril 40 mg/day + Carvedilol 6.25 mg BID  Patient was concerned for sudden increase of BP readings, spoke with a doctor on call over the weekend and was started on Amlodipine 5 mg x2 days ago.         Do you check your blood pressure regularly outside of the clinic? Yes     Are you following a low salt diet? Yes    Are your blood pressures ever more than 140 on the top number (systolic) OR more   than 90 on the bottom number (diastolic), for example 140/90? Yes 190s/100      BP is elevated in the clinic today- denies having any symptoms.     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 at [place]     NIELS (obstructive sleep apnea)- moderate (AHI 15)     Long-term (current) use of anticoagulants [Z79.01]     Past Surgical History:   Procedure Laterality Date     HC THYROIDECTOMY  2005    Hurthle cell cancer, total thyroidectomy     LAMINECT/DISCECTOMY, LUMBAR  1999    lumbar micordiskectomy (L4-5)     TONSILLECTOMY & ADENOIDECTOMY  AGE 8       Social History     Tobacco Use     Smoking status: Never Smoker     Smokeless tobacco: Never Used   Substance Use Topics     Alcohol use: Yes     Alcohol/week: 0.0 standard drinks     Comment: beer     Family History   Problem Relation Age of Onset     Asthma Brother      Coronary Artery Disease Brother      Unknown/Adopted Daughter      Diabetes Mother      Diabetes Brother         d age 78         Current Outpatient Medications   Medication Sig Dispense Refill     amLODIPine (NORVASC) 5 MG tablet Take 1 tablet (5 mg) by mouth daily 30 tablet 0     carvedilol (COREG) 12.5 MG tablet Take  1 tablet (12.5 mg) by mouth 2 times daily (with meals) 180 tablet 3     Cholecalciferol (VITAMIN D3 PO) Take 500 Units by mouth daily        ipratropium (ATROVENT) 0.06 % spray        levothyroxine (SYNTHROID, LEVOTHROID) 137 MCG tablet 137 mcg daily       lisinopril (PRINIVIL/ZESTRIL) 40 MG tablet Take 1 tablet (40 mg) by mouth daily 90 tablet 3     order for DME INR to be checked the week of January 20, 2020 and every 2 - 6 weeks prn as directed by INR clinic    Please fax results to [place] [facility] INR clinic at: [phone] 1 each 11     order for DME please draw INR the week of February 19-23. Please fax results to [phone]. [facility] INR [facility].   phone number [phone] 1 each 1     warfarin (COUMADIN) 5 MG tablet Take 1.5 tablets Mondays and Thursdays and 1 tablet all other days of the week or as directed       rosuvastatin (CRESTOR) 10 MG tablet Take 10 mg by mouth       No Known Allergies    Reviewed and updated as needed this visit by Provider  Tobacco         Review of Systems   Constitutional, HEENT, cardiovascular, pulmonary, gi and gu systems are negative, except as otherwise noted.      Objective    BP (!) 155/80   Pulse 89   Temp 97  F (36.1  C) (Tympanic)   Wt 104.3 kg (230 lb)   BMI 31.19 kg/m    Body mass index is 31.19 kg/m .  Physical Exam   GENERAL: healthy, alert and no distress  RESP: lungs clear to auscultation - no rales, rhonchi or wheezes  CV: regular rate and rhythm, normal S1 S2, no S3 or S4, no murmur, click or rub, no peripheral edema and peripheral pulses strong    Diagnostic Test Results:  Labs reviewed in Epic        Assessment & Plan     William was seen today for hypertension.    Diagnoses and all orders for this visit:    Hypertension goal BP (blood pressure) < 140/90, elevated. Asymptomatic  - Hold Amlodipine     - Increase: carvedilol (COREG) 12.5 MG tablet; Take 1 tablet (12.5 mg) by mouth 2 times daily (with meals)  - Keep Home BP log   - Discussed healthy diet,  DASH diet and regular exercise as tolerated post recent hip surgery.       Return in about 8 days (around 8/11/2020) for BP Recheck (Ancillary Visit).    Arelis Alexandra MD  [facility]

## 2020-08-17 ENCOUNTER — OFFICE VISIT (OUTPATIENT)
Dept: FAMILY MEDICINE | Facility: CLINIC | Age: 80
End: 2020-08-17
Payer: MEDICARE

## 2020-08-17 VITALS — DIASTOLIC BLOOD PRESSURE: 68 MMHG | SYSTOLIC BLOOD PRESSURE: 120 MMHG | HEART RATE: 72 BPM

## 2020-08-17 DIAGNOSIS — I10 ESSENTIAL HYPERTENSION: Primary | ICD-10-CM

## 2020-08-17 PROCEDURE — 99207 ZZC NO CHARGE NURSE ONLY: CPT | Performed by: FAMILY MEDICINE

## 2020-08-17 NOTE — PROGRESS NOTES
William Lechuga is a 79 year old patient who comes in today for a Blood Pressure check.    Patient is taking medications as advised and is tolerating well, denies any side effects with taking medications.  Did receive an at home reading this morning of 160/60 with his machine at home but will see 138/70 on average over the past x1 week.      I also calibrated his machine during this visit, and it is very close to my manual readings of BP and should be good to use at home.     Initial BP: 160/62  Recheck BP: /68   Pulse 72      Pulse: 72    Disposition: follow-up as previously indicated by provider and results routed to provider      Soha Randolph MA

## 2020-08-20 ENCOUNTER — ANTICOAGULATION THERAPY VISIT (OUTPATIENT)
Dept: INTERNAL MEDICINE | Facility: CLINIC | Age: 80
End: 2020-08-20

## 2020-08-20 DIAGNOSIS — I48.20 CHRONIC ATRIAL FIBRILLATION (H): ICD-10-CM

## 2020-08-20 DIAGNOSIS — Z79.01 LONG TERM CURRENT USE OF ANTICOAGULANT THERAPY: ICD-10-CM

## 2020-08-20 LAB
CAPILLARY BLOOD COLLECTION: NORMAL
INR PPP: 4 (ref 0.86–1.14)

## 2020-08-20 PROCEDURE — 99207 ZZC NO CHARGE NURSE ONLY: CPT | Performed by: INTERNAL MEDICINE

## 2020-08-20 PROCEDURE — 85610 PROTHROMBIN TIME: CPT | Performed by: INTERNAL MEDICINE

## 2020-08-20 PROCEDURE — 36416 COLLJ CAPILLARY BLOOD SPEC: CPT | Performed by: INTERNAL MEDICINE

## 2020-08-20 NOTE — PROGRESS NOTES
ANTICOAGULATION FOLLOW-UP CLINIC VISIT    Patient Name:  William Lechuga  Date:  2020  Contact Type:  Telephone    SUBJECTIVE:  Patient Findings     Comments:   Spoke with patient, he had cataract surgery 3 weeks ago on left eye and 2 days ago on right, so he is taking steriod eye drops qid and will be tapering off. He is feeling well, will hold tomorrow (he took today's), decrease Saturday and resume maintenance on  and recheck in 10 days. Patient voiced understanding and agreement        Clinical Outcomes     Comments:   Spoke with patient, he had cataract surgery 3 weeks ago on left eye and 2 days ago on right, so he is taking steriod eye drops qid and will be tapering off. He is feeling well, will hold tomorrow (he took today's), decrease Saturday and resume maintenance on  and recheck in 10 days. Patient voiced understanding and agreement           OBJECTIVE    Recent labs: (last 7 days)     20  0813   INR 4.00*       ASSESSMENT / PLAN  INR assessment SUPRA    Recheck INR In: 10 DAYS    INR Location Clinic      Anticoagulation Summary  As of 2020    INR goal:   2.0-3.0   TTR:   78.2 % (1 y)   INR used for dosin.00! (2020)   Warfarin maintenance plan:   5 mg (5 mg x 1) every day   Full warfarin instructions:   : Hold; : 2.5 mg; Otherwise 5 mg every day   Weekly warfarin total:   35 mg   Plan last modified:   Nadine Fried RN (2020)   Next INR check:   2020   Priority:   High   Target end date:   Indefinite    Indications    Long-term (current) use of anticoagulants [Z79.01] [Z79.01]  Chronic atrial fibrillation (H) [I48.20]             Anticoagulation Episode Summary     INR check location:       Preferred lab:       Send INR reminders to:   [facility] BLOOD    Comments:         Anticoagulation Care Providers     Provider Role Specialty Phone number    Prakash Gonzalez MD Referring Internal Medicine [phone]    Narcisa Mcmahon MD Responsible Family  Practice [phone]            See the Encounter Report to view Anticoagulation Flowsheet and Dosing Calendar (Go to Encounters tab in chart review, and find the Anticoagulation Therapy Visit)    Dosage adjustment made based on physician directed care plan.    AUDELIA DONALD

## 2020-09-01 ENCOUNTER — ANTICOAGULATION THERAPY VISIT (OUTPATIENT)
Dept: INTERNAL MEDICINE | Facility: CLINIC | Age: 80
End: 2020-09-01

## 2020-09-01 DIAGNOSIS — I48.20 CHRONIC ATRIAL FIBRILLATION (H): ICD-10-CM

## 2020-09-01 DIAGNOSIS — Z79.01 LONG TERM CURRENT USE OF ANTICOAGULANT THERAPY: ICD-10-CM

## 2020-09-01 LAB
CAPILLARY BLOOD COLLECTION: NORMAL
INR PPP: 3.4 (ref 0.86–1.14)

## 2020-09-01 PROCEDURE — 85610 PROTHROMBIN TIME: CPT | Performed by: INTERNAL MEDICINE

## 2020-09-01 PROCEDURE — 99207 ZZC NO CHARGE NURSE ONLY: CPT | Performed by: INTERNAL MEDICINE

## 2020-09-01 PROCEDURE — 36416 COLLJ CAPILLARY BLOOD SPEC: CPT | Performed by: INTERNAL MEDICINE

## 2020-09-01 NOTE — PROGRESS NOTES
ANTICOAGULATION FOLLOW-UP CLINIC VISIT    Patient Name:  William Lehcuga  Date:  9/1/2020  Contact Type:  Telephone    SUBJECTIVE:  Patient Findings     Comments:   Spoke with patient, patient denies any problems. He is still on the steroid eye drops from cataract surgery. He took today's dose, so he will hold tomorrow's warfarin dose and recheck in 2 weeks, he should be almost done with the eye drops by then.            Clinical Outcomes     Comments:   Spoke with patient, patient denies any problems. He is still on the steroid eye drops from cataract surgery. He took today's dose, so he will hold tomorrow's warfarin dose and recheck in 2 weeks, he should be almost done with the eye drops by then.               OBJECTIVE    Recent labs: (last 7 days)     09/01/20  0845   INR 3.40*       ASSESSMENT / PLAN  INR assessment SUPRA    Recheck INR In: 2 WEEKS    INR Location Clinic      Anticoagulation Summary  As of 9/1/2020    INR goal:   2.0-3.0   TTR:   75.0 % (1 y)   INR used for dosing:   3.40! (9/1/2020)   Warfarin maintenance plan:   5 mg (5 mg x 1) every day   Full warfarin instructions:   9/2: Hold; Otherwise 5 mg every day   Weekly warfarin total:   35 mg   Plan last modified:   Nadine Fried RN (1/8/2020)   Next INR check:   9/17/2020   Priority:   High   Target end date:   Indefinite    Indications    Long-term (current) use of anticoagulants [Z79.01] [Z79.01]  Chronic atrial fibrillation (H) [I48.20]             Anticoagulation Episode Summary     INR check location:       Preferred lab:       Send INR reminders to:   [facility] BLOOD    Comments:         Anticoagulation Care Providers     Provider Role Specialty Phone number    Prakash Gonzalez MD Referring Internal Medicine [phone]    Narcisa Mcmahon MD Responsible Family Practice [phone]            See the Encounter Report to view Anticoagulation Flowsheet and Dosing Calendar (Go to Encounters tab in chart review, and find the Anticoagulation  Therapy Visit)    Dosage adjustment made based on physician directed care plan.    AUDELIA DONALD

## 2020-09-17 ENCOUNTER — ANTICOAGULATION THERAPY VISIT (OUTPATIENT)
Dept: INTERNAL MEDICINE | Facility: CLINIC | Age: 80
End: 2020-09-17

## 2020-09-17 DIAGNOSIS — I48.20 CHRONIC ATRIAL FIBRILLATION (H): ICD-10-CM

## 2020-09-17 DIAGNOSIS — Z79.01 LONG TERM CURRENT USE OF ANTICOAGULANT THERAPY: ICD-10-CM

## 2020-09-17 LAB
CAPILLARY BLOOD COLLECTION: NORMAL
INR PPP: 3.5 (ref 0.86–1.14)

## 2020-09-17 PROCEDURE — 99207 ZZC NO CHARGE NURSE ONLY: CPT | Performed by: INTERNAL MEDICINE

## 2020-09-17 PROCEDURE — 36416 COLLJ CAPILLARY BLOOD SPEC: CPT | Performed by: INTERNAL MEDICINE

## 2020-09-17 PROCEDURE — 85610 PROTHROMBIN TIME: CPT | Performed by: INTERNAL MEDICINE

## 2020-09-17 NOTE — PROGRESS NOTES
ANTICOAGULATION FOLLOW-UP CLINIC VISIT    Patient Name:  William Lechuga  Date:  9/17/2020  Contact Type:  Telephone    SUBJECTIVE:  Patient Findings     Comments:   Spoke with patient, denies any problems, still using the eye drops. He took today's dose, so he will hold tomorrow and will decrease maintenance. He voiced understanding and agreement        Clinical Outcomes     Comments:   Spoke with patient, denies any problems, still using the eye drops. He took today's dose, so he will hold tomorrow and will decrease maintenance. He voiced understanding and agreement           OBJECTIVE    Recent labs: (last 7 days)     09/17/20  0858   INR 3.50*       ASSESSMENT / PLAN  INR assessment SUPRA    Recheck INR In: 2 WEEKS    INR Location Clinic      Anticoagulation Summary  As of 9/17/2020    INR goal:   2.0-3.0   TTR:   70.6 % (1 y)   INR used for dosing:   3.50! (9/17/2020)   Warfarin maintenance plan:   2.5 mg (5 mg x 0.5) every Mon; 5 mg (5 mg x 1) all other days   Full warfarin instructions:   9/18: Hold; Otherwise 2.5 mg every Mon; 5 mg all other days   Weekly warfarin total:   32.5 mg   Plan last modified:   Arminda Liu (9/17/2020)   Next INR check:   10/1/2020   Priority:   High   Target end date:   Indefinite    Indications    Long-term (current) use of anticoagulants [Z79.01] [Z79.01]  Chronic atrial fibrillation (H) [I48.20]             Anticoagulation Episode Summary     INR check location:       Preferred lab:       Send INR reminders to:   [facility] BLOOD    Comments:         Anticoagulation Care Providers     Provider Role Specialty Phone number    Prakash Gonzalez MD Referring Internal Medicine [phone]    Narcisa Mcmahon MD Responsible Family Practice [phone]            See the Encounter Report to view Anticoagulation Flowsheet and Dosing Calendar (Go to Encounters tab in chart review, and find the Anticoagulation Therapy Visit)    Dosage adjustment made based on physician directed care  plan.    AUDELIA DONALD

## 2020-10-01 ENCOUNTER — ANTICOAGULATION THERAPY VISIT (OUTPATIENT)
Dept: INTERNAL MEDICINE | Facility: CLINIC | Age: 80
End: 2020-10-01

## 2020-10-01 DIAGNOSIS — I48.20 CHRONIC ATRIAL FIBRILLATION (H): ICD-10-CM

## 2020-10-01 DIAGNOSIS — Z79.01 LONG TERM CURRENT USE OF ANTICOAGULANT THERAPY: ICD-10-CM

## 2020-10-01 LAB
CAPILLARY BLOOD COLLECTION: NORMAL
INR PPP: 2.7 (ref 0.86–1.14)

## 2020-10-01 PROCEDURE — 99207 PR NO CHARGE NURSE ONLY: CPT | Performed by: INTERNAL MEDICINE

## 2020-10-01 PROCEDURE — 36416 COLLJ CAPILLARY BLOOD SPEC: CPT | Performed by: INTERNAL MEDICINE

## 2020-10-01 PROCEDURE — 85610 PROTHROMBIN TIME: CPT | Performed by: INTERNAL MEDICINE

## 2020-10-01 NOTE — PROGRESS NOTES
ANTICOAGULATION FOLLOW-UP CLINIC VISIT    Patient Name:  William Lechuga  Date:  10/1/2020  Contact Type:  Telephone    SUBJECTIVE:  Patient Findings     Comments:  Spoke with patient, he denies any problems or concerns. He has completed the eye drops. Continue same dose and recheck in 4 weeks        Clinical Outcomes     Comments:  Spoke with patient, he denies any problems or concerns. He has completed the eye drops. Continue same dose and recheck in 4 weeks           OBJECTIVE    Recent labs: (last 7 days)     10/01/20  0851   INR 2.70*       ASSESSMENT / PLAN  INR assessment THER    Recheck INR In: 4 WEEKS    INR Location Clinic      Anticoagulation Summary  As of 10/1/2020    INR goal:  2.0-3.0   TTR:  68.2 % (1 y)   INR used for dosin.70 (10/1/2020)   Warfarin maintenance plan:  2.5 mg (5 mg x 0.5) every Mon; 5 mg (5 mg x 1) all other days   Full warfarin instructions:  2.5 mg every Mon; 5 mg all other days   Weekly warfarin total:  32.5 mg   No change documented:  Arminda Liu   Plan last modified:  Arminda Liu (2020)   Next INR check:  10/29/2020   Priority:  Maintenance   Target end date:  Indefinite    Indications    Long-term (current) use of anticoagulants [Z79.01] [Z79.01]  Chronic atrial fibrillation (H) [I48.20]             Anticoagulation Episode Summary     INR check location:      Preferred lab:      Send INR reminders to:  [facility] BLOOD    Comments:        Anticoagulation Care Providers     Provider Role Specialty Phone number    Prakash Gonzalez MD Referring Internal Medicine [phone]    Narcisa Mcmahon MD Responsible Family Practice [phone]            See the Encounter Report to view Anticoagulation Flowsheet and Dosing Calendar (Go to Encounters tab in chart review, and find the Anticoagulation Therapy Visit)    Dosage adjustment made based on physician directed care plan.    ARMINDA LIU

## 2020-10-29 ENCOUNTER — ANTICOAGULATION THERAPY VISIT (OUTPATIENT)
Dept: NURSING | Facility: CLINIC | Age: 80
End: 2020-10-29
Payer: MEDICARE

## 2020-10-29 DIAGNOSIS — I48.20 CHRONIC ATRIAL FIBRILLATION (H): ICD-10-CM

## 2020-10-29 DIAGNOSIS — Z79.01 LONG TERM CURRENT USE OF ANTICOAGULANT THERAPY: ICD-10-CM

## 2020-10-29 LAB
CAPILLARY BLOOD COLLECTION: NORMAL
INR PPP: 2.5 (ref 0.86–1.14)

## 2020-10-29 PROCEDURE — 85610 PROTHROMBIN TIME: CPT | Performed by: INTERNAL MEDICINE

## 2020-10-29 PROCEDURE — 36416 COLLJ CAPILLARY BLOOD SPEC: CPT | Performed by: INTERNAL MEDICINE

## 2020-10-29 PROCEDURE — 99207 PR NO CHARGE NURSE ONLY: CPT

## 2020-10-29 RX ORDER — WARFARIN SODIUM 5 MG/1
TABLET ORAL
Start: 2020-10-29 | End: 2023-03-03

## 2020-10-29 NOTE — PROGRESS NOTES
ANTICOAGULATION MANAGEMENT     Patient Name:  William Lechuga  Date:  10/29/2020    ASSESSMENT /SUBJECTIVE:    Today's INR result of 2.5 is therapeutic. Goal INR of 2.0-3.0      Warfarin dose taken: Warfarin taken as instructed    Diet: No new diet changes affecting INR    Medication changes/ interactions: No new medications/supplements affecting INR    Previous INR: Therapeutic     S/S of bleeding or thromboembolism: No    New injury or illness: No    Upcoming surgery, procedure or cardioversion: No    Additional findings: None      PLAN:    Telephone call with William regarding INR result and instructed:     Warfarin Dosing Instructions: Continue your current warfarin dose 2.5 mg Monday and 5 mg rest of days    Instructed patient to follow up no later than: 4 weeks  Lab visit scheduled    Education provided: Contact the anticoagulation clinic with any changes, questions or concerns at #[phone]       William verbalizes understanding and agrees to warfarin dosing plan.    Instructed to call the Anticoagulation Clinic for any changes, questions or concerns. (#[phone])        Pat Olsen RN      OBJECTIVE:  Recent labs: (last 7 days)     10/29/20  0830   INR 2.50*         INR assessment THER    Recheck INR In: 4 WEEKS    INR Location Clinic      Anticoagulation Summary  As of 10/29/2020    INR goal:  2.0-3.0   TTR:  68.2 % (1 y)   INR used for dosin.50 (10/29/2020)   Warfarin maintenance plan:  2.5 mg (5 mg x 0.5) every Mon; 5 mg (5 mg x 1) all other days   Full warfarin instructions:  2.5 mg every Mon; 5 mg all other days   Weekly warfarin total:  32.5 mg   No change documented:  Pat Olsen RN   Plan last modified:  Arminda Liu (2020)   Next INR check:  2020   Priority:  Maintenance   Target end date:  Indefinite    Indications    Long-term (current) use of anticoagulants [Z79.01] [Z79.01]  Chronic atrial fibrillation (H) [I48.20]             Anticoagulation Episode Summary      INR check location:      Preferred lab:      Send INR reminders to:  ADWO[facility] BLOOD    Comments:        Anticoagulation Care Providers     Provider Role Specialty Phone number    Prakash Gonzalez MD Referring Internal Medicine [phone]    Narcisa Mcmahon MD Responsible [facility] [phone]       ANTICOAGULATION FOLLOW-UP CLINIC VISIT    Patient Name:  William Lechuga  Date:  10/29/2020  Contact Type:  Telephone    SUBJECTIVE:         OBJECTIVE    Recent labs: (last 7 days)     10/29/20  0830   INR 2.50*       ASSESSMENT / PLAN  INR assessment THER    Recheck INR In: 4 WEEKS    INR Location Clinic      Anticoagulation Summary  As of 10/29/2020    INR goal:  2.0-3.0   TTR:  68.2 % (1 y)   INR used for dosin.50 (10/29/2020)   Warfarin maintenance plan:  2.5 mg (5 mg x 0.5) every Mon; 5 mg (5 mg x 1) all other days   Full warfarin instructions:  2.5 mg every Mon; 5 mg all other days   Weekly warfarin total:  32.5 mg   No change documented:  Pat Olsen RN   Plan last modified:  Arminda Liu (2020)   Next INR check:  2020   Priority:  Maintenance   Target end date:  Indefinite    Indications    Long-term (current) use of anticoagulants [Z79.01] [Z79.01]  Chronic atrial fibrillation (H) [I48.20]             Anticoagulation Episode Summary     INR check location:      Preferred lab:      Send INR reminders to:  ADWOA BLOOD    Comments:        Anticoagulation Care Providers     Provider Role Specialty Phone number    Prakash Gonzalez MD Referring Internal Medicine [phone]    Narcisa Mcmahon MD [facility] [phone]            See the Encounter Report to view Anticoagulation Flowsheet and Dosing Calendar (Go to Encounters tab in chart review, and find the Anticoagulation Therapy Visit)    Dosage adjustment made based on physician directed care plan.    Pat Olsen RN

## 2020-11-30 ENCOUNTER — ANTICOAGULATION THERAPY VISIT (OUTPATIENT)
Dept: INTERNAL MEDICINE | Facility: CLINIC | Age: 80
End: 2020-11-30

## 2020-11-30 DIAGNOSIS — Z79.01 LONG TERM CURRENT USE OF ANTICOAGULANT THERAPY: ICD-10-CM

## 2020-11-30 DIAGNOSIS — I48.20 CHRONIC ATRIAL FIBRILLATION (H): ICD-10-CM

## 2020-11-30 LAB
CAPILLARY BLOOD COLLECTION: NORMAL
INR PPP: 3.3 (ref 0.86–1.14)

## 2020-11-30 PROCEDURE — 36416 COLLJ CAPILLARY BLOOD SPEC: CPT | Performed by: INTERNAL MEDICINE

## 2020-11-30 PROCEDURE — 85610 PROTHROMBIN TIME: CPT | Performed by: INTERNAL MEDICINE

## 2020-11-30 PROCEDURE — 99207 PR NO CHARGE NURSE ONLY: CPT | Performed by: INTERNAL MEDICINE

## 2020-11-30 NOTE — PROGRESS NOTES
ANTICOAGULATION FOLLOW-UP CLINIC VISIT    Patient Name:  William Lechuga  Date:  11/30/2020  Contact Type:  Telephone    SUBJECTIVE:  Patient Findings     Positives:  Change in diet/appetite    Comments:  Not eating as many salads.  Cranberry sauce with turkey.  Plans to resume normal diet.        Clinical Outcomes     Negatives:  Major bleeding event, Thromboembolic event, Anticoagulation-related hospital admission, Anticoagulation-related ED visit, Anticoagulation-related fatality    Comments:  Not eating as many salads.  Cranberry sauce with turkey.  Plans to resume normal diet.           OBJECTIVE    Recent labs: (last 7 days)     11/30/20  0831   INR 3.30*       ASSESSMENT / PLAN  INR assessment SUPRA    Recheck INR In: 2 WEEKS    INR Location Clinic      Anticoagulation Summary  As of 11/30/2020    INR goal:  2.0-3.0   TTR:  67.1 % (1 y)   INR used for dosing:  3.30 (11/30/2020)   Warfarin maintenance plan:  2.5 mg (5 mg x 0.5) every Mon; 5 mg (5 mg x 1) all other days   Full warfarin instructions:  2.5 mg every Mon; 5 mg all other days   Weekly warfarin total:  32.5 mg   No change documented:  Camila Vela, RN   Plan last modified:  Arminda Liu (9/17/2020)   Next INR check:  12/14/2020   Priority:  Maintenance   Target end date:  Indefinite    Indications    Long-term (current) use of anticoagulants [Z79.01] [Z79.01]  Chronic atrial fibrillation (H) [I48.20]             Anticoagulation Episode Summary     INR check location:      Preferred lab:      Send INR reminders to:  ADWOA BLOOD    Comments:        Anticoagulation Care Providers     Provider Role Specialty Phone number    Prakash Gonzalez MD Referring Internal Medicine [phone]    Narcisa Mcmahon MD Responsible Family Medicine [phone]            See the Encounter Report to view Anticoagulation Flowsheet and Dosing Calendar (Go to Encounters tab in chart review, and find the Anticoagulation Therapy Visit)        Camila Vela,  RN

## 2020-12-13 ENCOUNTER — HEALTH MAINTENANCE LETTER (OUTPATIENT)
Age: 80
End: 2020-12-13

## 2020-12-15 ENCOUNTER — ANTICOAGULATION THERAPY VISIT (OUTPATIENT)
Dept: NURSING | Facility: CLINIC | Age: 80
End: 2020-12-15

## 2020-12-15 DIAGNOSIS — I48.20 CHRONIC ATRIAL FIBRILLATION (H): ICD-10-CM

## 2020-12-15 DIAGNOSIS — Z79.01 LONG TERM CURRENT USE OF ANTICOAGULANT THERAPY: ICD-10-CM

## 2020-12-15 LAB
CAPILLARY BLOOD COLLECTION: NORMAL
INR PPP: 2.4 (ref 0.86–1.14)

## 2020-12-15 PROCEDURE — 85610 PROTHROMBIN TIME: CPT | Performed by: INTERNAL MEDICINE

## 2020-12-15 PROCEDURE — 99207 PR NO CHARGE NURSE ONLY: CPT

## 2020-12-15 PROCEDURE — 36416 COLLJ CAPILLARY BLOOD SPEC: CPT | Performed by: INTERNAL MEDICINE

## 2020-12-15 NOTE — PROGRESS NOTES
ANTICOAGULATION MANAGEMENT     Patient Name:  William Lechuga  Date:  12/15/2020    ASSESSMENT /SUBJECTIVE:    Today's INR result of 2.4 is therapeutic. Goal INR of 2.0-3.0      Warfarin dose taken: Warfarin taken as instructed    Diet: No new diet changes affecting INR    Medication changes/ interactions: No new medications/supplements affecting INR    Previous INR: Supratherapeutic     S/S of bleeding or thromboembolism: No    New injury or illness: No    Upcoming surgery, procedure or cardioversion: No    Additional findings: None      PLAN:    Telephone call with William regarding INR result and instructed:     Warfarin Dosing Instructions: Continue your current warfarin dose 2.5 mg Monday and 5 mg all other days.     Instructed patient to follow up no later than: 4 weeks  Lab visit scheduled    Education provided: Please call back if any changes to your diet, medications or how you've been taking warfarin and Contact the anticoagulation clinic with any changes, questions or concerns at #[phone]       William verbalizes understanding and agrees to warfarin dosing plan.    Instructed to call the Anticoagulation Clinic for any changes, questions or concerns. (#[phone])        Pat Olsen RN      OBJECTIVE:  Recent labs: (last 7 days)     12/15/20  0837   INR 2.40*         No question data found.  Anticoagulation Summary  As of 12/15/2020    INR goal:  2.0-3.0   TTR:  69.8 % (1 y)   INR used for dosin.40 (12/15/2020)   Warfarin maintenance plan:  2.5 mg (5 mg x 0.5) every Mon; 5 mg (5 mg x 1) all other days   Full warfarin instructions:  2.5 mg every Mon; 5 mg all other days   Weekly warfarin total:  32.5 mg   Plan last modified:  Arminda Liu (2020)   Next INR check:     Priority:  Maintenance   Target end date:  Indefinite    Indications    Long-term (current) use of anticoagulants [Z79.01] [Z79.01]  Chronic atrial fibrillation (H) [I48.20]             Anticoagulation Episode Summary      INR check location:      Preferred lab:      Send INR reminders to:  ADWOA BLOOD    Comments:        Anticoagulation Care Providers     Provider Role Specialty Phone number    Prakash Gonzalez MD Referring Internal Medicine [phone]    Narcisa Mcmahon MD Responsible Family Medicine [phone]

## 2021-01-06 ENCOUNTER — TELEPHONE (OUTPATIENT)
Dept: SLEEP MEDICINE | Facility: CLINIC | Age: 81
End: 2021-01-06

## 2021-01-06 NOTE — TELEPHONE ENCOUNTER
Left message for patient to call and scheduled a follow up as he is now 2 years out.    Also sent a mychart.    Dayna Valdez [facility]

## 2021-01-07 ENCOUNTER — CARE COORDINATION (OUTPATIENT)
Dept: SLEEP MEDICINE | Facility: CLINIC | Age: 81
End: 2021-01-07

## 2021-01-07 NOTE — PROGRESS NOTES
Faxed signed CMN for cpap supplies signed by DOD to [facility].  Patient was lvm, and sent a [facility] message stating he should schedule appointment with sleep provider as it has been 2 years.

## 2021-01-11 ENCOUNTER — DOCUMENTATION ONLY (OUTPATIENT)
Dept: SLEEP MEDICINE | Facility: CLINIC | Age: 81
End: 2021-01-11

## 2021-01-12 ENCOUNTER — MYC MEDICAL ADVICE (OUTPATIENT)
Dept: SLEEP MEDICINE | Facility: CLINIC | Age: 81
End: 2021-01-12

## 2021-01-12 ENCOUNTER — ANTICOAGULATION THERAPY VISIT (OUTPATIENT)
Dept: NURSING | Facility: CLINIC | Age: 81
End: 2021-01-12

## 2021-01-12 ENCOUNTER — TELEPHONE (OUTPATIENT)
Dept: INTERNAL MEDICINE | Facility: CLINIC | Age: 81
End: 2021-01-12

## 2021-01-12 DIAGNOSIS — Z79.01 LONG TERM CURRENT USE OF ANTICOAGULANT THERAPY: ICD-10-CM

## 2021-01-12 DIAGNOSIS — I48.20 CHRONIC ATRIAL FIBRILLATION (H): ICD-10-CM

## 2021-01-12 LAB
CAPILLARY BLOOD COLLECTION: NORMAL
INR PPP: 2.2 (ref 0.86–1.14)

## 2021-01-12 PROCEDURE — 85610 PROTHROMBIN TIME: CPT | Performed by: INTERNAL MEDICINE

## 2021-01-12 PROCEDURE — 36416 COLLJ CAPILLARY BLOOD SPEC: CPT | Performed by: INTERNAL MEDICINE

## 2021-01-12 ASSESSMENT — SLEEP AND FATIGUE QUESTIONNAIRES
HOW LIKELY ARE YOU TO NOD OFF OR FALL ASLEEP WHILE SITTING QUIETLY AFTER LUNCH WITHOUT ALCOHOL: WOULD NEVER DOZE
HOW LIKELY ARE YOU TO NOD OFF OR FALL ASLEEP WHILE SITTING INACTIVE IN A PUBLIC PLACE: WOULD NEVER DOZE
HOW LIKELY ARE YOU TO NOD OFF OR FALL ASLEEP WHILE LYING DOWN TO REST IN THE AFTERNOON WHEN CIRCUMSTANCES PERMIT: SLIGHT CHANCE OF DOZING
HOW LIKELY ARE YOU TO NOD OFF OR FALL ASLEEP WHILE WATCHING TV: SLIGHT CHANCE OF DOZING
HOW LIKELY ARE YOU TO NOD OFF OR FALL ASLEEP WHILE SITTING AND READING: SLIGHT CHANCE OF DOZING
HOW LIKELY ARE YOU TO NOD OFF OR FALL ASLEEP WHILE SITTING AND TALKING TO SOMEONE: WOULD NEVER DOZE
HOW LIKELY ARE YOU TO NOD OFF OR FALL ASLEEP IN A CAR, WHILE STOPPED FOR A FEW MINUTES IN TRAFFIC: WOULD NEVER DOZE
HOW LIKELY ARE YOU TO NOD OFF OR FALL ASLEEP WHEN YOU ARE A PASSENGER IN A CAR FOR AN HOUR WITHOUT A BREAK: WOULD NEVER DOZE

## 2021-01-12 NOTE — TELEPHONE ENCOUNTER
[facility] to please review if the patient is able to 6 week between appointments.    Amanda Siegel RN    [facility]

## 2021-01-12 NOTE — PROGRESS NOTES
ANTICOAGULATION MANAGEMENT     Patient Name:  William Lechuga  Date:  2021    ASSESSMENT /SUBJECTIVE:    Today's INR result of 2.20 is therapeutic. Goal INR of 2.0-3.0      Warfarin dose taken: Warfarin taken as instructed    Diet: No new diet changes affecting INR    Medication changes/ interactions: will be taking 4 tablets of Amoxicillin for dental cleaning    Previous INR: Therapeutic     S/S of bleeding or thromboembolism: No    New injury or illness: No    Upcoming surgery, procedure or cardioversion: No    Additional findings: None      PLAN:    Telephone call with William regarding INR result and instructed:     Warfarin Dosing Instructions: Continue your current warfarin dose 2.5 mg on Mon; 5 mg all other day    Instructed patient to follow up no later than: 4 weeks  Lab visit scheduled    Education provided: None required      William verbalizes understanding and agrees to warfarin dosing plan.    Instructed to call the Anticoagulation Clinic for any changes, questions or concerns. (#[phone])        Amanda Siegel RN      OBJECTIVE:  Recent labs: (last 7 days)     21  0834   INR 2.20*         INR assessment THER    Recheck INR In: 4 WEEKS    INR Location Clinic      Anticoagulation Summary  As of 2021    INR goal:  2.0-3.0   TTR:  77.5 % (1 y)   INR used for dosin.20 (2021)   Warfarin maintenance plan:  2.5 mg (5 mg x 0.5) every Mon; 5 mg (5 mg x 1) all other days   Full warfarin instructions:  2.5 mg every Mon; 5 mg all other days   Weekly warfarin total:  32.5 mg   No change documented:  Amanda Siegel RN   Plan last modified:  Arminda Liu (2020)   Next INR check:  2021   Priority:  Maintenance   Target end date:  Indefinite    Indications    Long-term (current) use of anticoagulants [Z79.01] [Z79.01]  Chronic atrial fibrillation (H) [I48.20]             Anticoagulation Episode Summary     INR check location:      Preferred lab:      Send INR reminders  to:  ADWOA BLOOD    Comments:        Anticoagulation Care Providers     Provider Role Specialty Phone number    Prakash Gonzalez MD Referring Internal Medicine [phone]    Narcisa Mcmahon MD Responsible Family Medicine [phone]

## 2021-01-12 NOTE — TELEPHONE ENCOUNTER
If routine cleaning/no dental issues resulting in need for antibiotics, etc OK to extend to 6 weeks.  INR would need to remain in range through May to discuss extensions beyond 6 week rechecks.    Ariana mAor, PharmD BCACP  Anticoagulation Clinical Pharmacist

## 2021-01-12 NOTE — TELEPHONE ENCOUNTER
Noted on [facility] encounter to ok to extend to 6 weeks.    Amanda Siegel RN    [facility]

## 2021-01-13 NOTE — PROGRESS NOTES
William is a 80 year old who is being evaluated via a billable video visit.      How would you like to obtain your AVS? MyChart  If the video visit is dropped, the invitation should be resent by: Send to e-mail at: [email or web address]  Will anyone else be joining your video visit? No      Video Start Time: 10:29AM  [facility]   Outpatient Sleep Medicine Consultation  January 14, 2021      Name: William Lechuga MRN# [id number]   Age: 80 year old YOB: 1940     Date of Consultation: January 14, 2021  Consultation is requested by: No referring provider defined for this encounter. No ref. provider found  Primary care provider: Prakash Gonzalez         Assessment and Plan:   1. NIELS (obstructive sleep apnea)  2. Essential hypertension  3. Obesity (BMI 30.0-34.9)    Patient presents to clinic today to re-establish care for his moderate obstructive sleep apnea treated with CPAP. Patient's sleep apnea is adequately managed and well treated with current PAP settings [id number] with low residual AHI of 2.4 events per hour. Compliance is excellent. Tolerating PAP therapy well. Daytime symptoms and nighttime sleep quality are improved and stable with continued use. Primary reason for today's visit was to obtain new mask/supplies prescription, this was given to the patient. He has been using [facility] but would like to transfer care to [facility] for DME. No indication to change pressure settings today. Will continue nightly use.     - Comprehensive DME    Assuming he continues to do as well as he currently is, William Lechuga will follow up in about 1 year, or sooner as needed.        Chief Complaint / Reason for Sleep Consult:     Re-establish care         History of Present Illness:     William Lechuga is a 80 year old male who presents to the clinic to re-establish care for his moderate obstructive sleep apnea treated with CPAP therapy. Last seen 1/8/2018. 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     Originally diagnosed via split night PSG on 10/29/2015 (224lbs) showing AHI was 15.9, lowest oxygen saturation was 80.3% (time spent below 89% was 25.4 minutes). RDI 18.0. Periodic Limb Movement Index 22.8/hour. PLM arousal index 1.3 per hour. Patient was titrated to 5cmH2O which was considered adequate with AHI 0 and PLM index 0. Presenting symptoms included loud snoring, gasping, witnessed apnea, some fatigue, morning dry mouth, crowded oropharynx, and comorbid HTN and A-fib.     Returns to clinic today with primary goal of obtaining new mask/supplies. Reports he has been doing very well overall when it comes to sleep and his CPAP since his last visit. Reports 100% compliance. States \"its been a godsend for me and my wife would agree I think\". Without his CPAP will \"wake up many times and not be able to sleep again and snore like a buzz saw\". Has been using [facility] for DME the past number of years but is interested in changing to [facility] since he see's [place] providers for his healthcare.  Per patient the paperwork for transfer of care has already been started by [facility].     Overall, the patient rates their experience with PAP as 10 (0 poor, 10 great). Patient is using a full face mask. The mask is tolerable and overall comfortable though would prefer not to have to wear the mask. Has never tried different mask style but not interested as he is a mouth breather at night. The mask is not leaking significantly. They are snoring with the mask on \"occasionally just a little\" per his wife's comments. They are not having gasp arousals.  They are not having significant oral/nasal dryness or epistaxis. Does not use humidifier function. They are not having pain/skin breakdown. The pressure settings are comfortable.     Bedtime is typically 9:00PM. Usually it takes about 10-15 minutes to fall " asleep with the mask on. Wake time is typically 3-4:30AM.  Patient is using PAP therapy 8 hours per night. The patient is usually getting 6.5-7 hours of sleep per night.    Improvements noted with CPAP include feeling rested in the morning and refreshed from sleep, sleeping better with less arousals/insomnia, and improved daytime energy levels.     Respironics Auto-PAP 9-14 cmH2O download (12/12/20-1/10/21): 30 total days of use. 0 nonuse days.  Average use 7 hours 55 minutes per day.  100% days with >4 hours use.  Large leak 1 min 32 sec per day average. CPAP 90% pressure 11.4cm. AHI 2.4    No other sleep concerns or problems today.     SCALES       SLEEPINESS: [place] sleepiness scale: 3[normal < 11]          Medications:     Current Outpatient Medications   Medication Sig     amLODIPine (NORVASC) 5 MG tablet Take 1 tablet (5 mg) by mouth daily     carvedilol (COREG) 6.25 MG tablet Take 1 tablet (6.25 mg) by mouth 2 times daily (with meals)     Cholecalciferol (VITAMIN D3 PO) Take 500 Units by mouth daily      ipratropium (ATROVENT) 0.06 % spray      levothyroxine (SYNTHROID, LEVOTHROID) 137 MCG tablet 137 mcg daily     lisinopril (PRINIVIL/ZESTRIL) 40 MG tablet Take 1 tablet (40 mg) by mouth daily     order for DME INR to be checked the week of January 20, 2020 and every 2 - 6 weeks prn as directed by INR clinic    Please fax results to [facility] at: [phone]     order for DME please draw INR the week of February 19-23. Please fax results to [phone]. [facility].   phone number [phone]     rosuvastatin (CRESTOR) 10 MG tablet Take 10 mg by mouth     warfarin ANTICOAGULANT (COUMADIN ANTICOAGULANT) 5 MG tablet Take daily as directed. Current dose 2.5 mg Mondays and 5 mg rest of week days.     No current facility-administered medications for this visit.              Allergies:     No Known Allergies         Past Medical History:     Past Medical History:   Diagnosis Date      "Acquired cataract      Chronic atrial fibrillation (H)      Current use of long term anticoagulation      Hemorrhoids      Hypertension, goal below 140/90              Past Surgical History:    Previous upper airway history: T&A age 8  Past Surgical History:   Procedure Laterality Date     HC THYROIDECTOMY  2005    Hurthle cell cancer, total thyroidectomy     LAMINECT/DISCECTOMY, LUMBAR  1999    lumbar micordiskectomy (L4-5)     TONSILLECTOMY & ADENOIDECTOMY  AGE 8          Social History:     Social History     Tobacco Use     Smoking status: Never Smoker     Smokeless tobacco: Never Used   Substance Use Topics     Alcohol use: Yes     Alcohol/week: 0.0 standard drinks     Comment: beer          Family History:     Family History   Problem Relation Age of Onset     Asthma Brother      Coronary Artery Disease Brother      Unknown/Adopted Daughter      Diabetes Mother      Diabetes Brother         d age 78          Physical Examination:   There were no vitals taken for this visit.   Vitals - Patient Reported 1/13/2021   Height (Patient Reported) 6' 0\"   Weight (Patient Reported) 226 lb   BMI (Based on Pt Reported Ht/Wt) 30.65 kg/m2   General appearance: Awake, alert, cooperative. Well groomed. Sitting comfortably in chair. In no apparent distress.  HEENT: Head: Normocephalic, atraumatic. Eyes:Conjunctiva clear. Sclera normal. Nose: External appearance without deformity.   Cardiovascular: No JVD  Pulmonary:  Able to speak easily in full sentences. No cough or wheeze.   Skin:  No rashes or significant lesions on visible skin.   Neurologic: Alert, oriented x3.   Psychiatric: Mood euthymic. Affect congruent with full range and intensity.          Data: All pertinent previous laboratory data reviewed     No results found for: PH, PHARTERIAL, PO2, [id number], SAT, PCO2, HCO3, BASEEXCESS, AISHA, BEB  Lab Results   Component Value Date    TSH 0.91 10/19/2018    TSH 0.8 05/01/2018     Lab Results   Component Value Date    GLC " 98 07/22/2020     (H) 10/19/2018     No results found for: HGB  Lab Results   Component Value Date    BUN 19 07/22/2020    BUN 17 10/19/2018    CR 0.91 07/22/2020    CR 0.99 10/19/2018     Lab Results   Component Value Date    AST 34 05/01/2018     No results found for: UAMP, UBARB, BENZODIAZEUR, UCANN, UCOC, OPIT, UPCP    Copy to: Prakash Gonzalez PA-C  Jan 14, 2021     [facility] [place]  [address] [place] [address]     [facility] [place]  [address] [address]    Chart documentation was completed, in part, with [employer] voice-recognition software. Even though reviewed, some grammatical, spelling, and word errors may remain.    Video-Visit Details    Type of service:  Video Visit    Video End Time:10:54AM    Originating Location (pt. Location): Home    Distant Location (provider location):  [facility]     Platform used for Video Visit: [email or web address]    50 minutes spent on day of encounter doing chart review, history and exam, documentation, and further activities as noted above

## 2021-01-13 NOTE — PATIENT INSTRUCTIONS

## 2021-01-14 ENCOUNTER — VIRTUAL VISIT (OUTPATIENT)
Dept: SLEEP MEDICINE | Facility: CLINIC | Age: 81
End: 2021-01-14
Payer: MEDICARE

## 2021-01-14 DIAGNOSIS — E66.811 OBESITY (BMI 30.0-34.9): ICD-10-CM

## 2021-01-14 DIAGNOSIS — G47.33 OSA (OBSTRUCTIVE SLEEP APNEA): Primary | ICD-10-CM

## 2021-01-14 DIAGNOSIS — I10 ESSENTIAL HYPERTENSION: ICD-10-CM

## 2021-01-14 DIAGNOSIS — G47.33 OBSTRUCTIVE SLEEP APNEA (ADULT) (PEDIATRIC): Primary | ICD-10-CM

## 2021-01-14 PROCEDURE — 99215 OFFICE O/P EST HI 40 MIN: CPT | Mod: 95 | Performed by: PHYSICIAN ASSISTANT

## 2021-01-19 NOTE — NURSING NOTE
1 year reminder post card has been created to send to pt for follow up.  Dayna Valdez [facility] ~[place]

## 2021-02-09 ENCOUNTER — ANTICOAGULATION THERAPY VISIT (OUTPATIENT)
Dept: NURSING | Facility: CLINIC | Age: 81
End: 2021-02-09

## 2021-02-09 DIAGNOSIS — I48.20 CHRONIC ATRIAL FIBRILLATION (H): ICD-10-CM

## 2021-02-09 DIAGNOSIS — Z79.01 LONG TERM CURRENT USE OF ANTICOAGULANT THERAPY: ICD-10-CM

## 2021-02-09 LAB — INR PPP: 2.3 (ref 0.86–1.14)

## 2021-02-09 PROCEDURE — 85610 PROTHROMBIN TIME: CPT | Performed by: INTERNAL MEDICINE

## 2021-02-09 PROCEDURE — 36416 COLLJ CAPILLARY BLOOD SPEC: CPT | Performed by: INTERNAL MEDICINE

## 2021-02-09 NOTE — PROGRESS NOTES
ANTICOAGULATION MANAGEMENT     Patient Name:  William Lechuga  Date:  2021    ASSESSMENT /SUBJECTIVE:    Today's INR result of 2.30 is therapeutic. Goal INR of 2.0-3.0      Warfarin dose taken: Warfarin taken as instructed    Diet: No new diet changes affecting INR    Medication changes/ interactions: No new medications/supplements affecting INR    Previous INR: Therapeutic     S/S of bleeding or thromboembolism: No    New injury or illness: No    Upcoming surgery, procedure or cardioversion: No    Additional findings: None      PLAN:    Telephone call with William regarding INR result and instructed:     Warfarin Dosing Instructions: Continue your current warfarin dose 2.5 mg Monday and 5 mg all other days    Instructed patient to follow up no later than: 5 weeks  Lab visit scheduled    Education provided: Please call back if any changes to your diet, medications or how you've been taking warfarin      William verbalizes understanding and agrees to warfarin dosing plan.    Instructed to call the Anticoagulation Clinic for any changes, questions or concerns. (#[phone])        Pat Olsen RN      OBJECTIVE:  Recent labs: (last 7 days)     21  0833   INR 2.30*         No question data found.  Anticoagulation Summary  As of 2021    INR goal:  2.0-3.0   TTR:  79.1 % (1 y)   INR used for dosin.30 (2021)   Warfarin maintenance plan:  2.5 mg (5 mg x 0.5) every Mon; 5 mg (5 mg x 1) all other days   Full warfarin instructions:  2.5 mg every Mon; 5 mg all other days   Weekly warfarin total:  32.5 mg   Plan last modified:  Arminda Liu (2020)   Next INR check:     Priority:  Maintenance   Target end date:  Indefinite    Indications    Long-term (current) use of anticoagulants [Z79.01] [Z79.01]  Chronic atrial fibrillation (H) [I48.20]             Anticoagulation Episode Summary     INR check location:      Preferred lab:      Send INR reminders to:  [facility] BLOOD    Comments:         Anticoagulation Care Providers     Provider Role Specialty Phone number    Prakash Gonzalez MD Referring Internal Medicine [phone]    Narcisa Mcmahon MD Responsible Family Medicine [phone]

## 2021-02-13 ENCOUNTER — IMMUNIZATION (OUTPATIENT)
Dept: NURSING | Facility: CLINIC | Age: 81
End: 2021-02-13
Payer: MEDICARE

## 2021-02-13 PROCEDURE — 91300 PR COVID VAC PFIZER DIL RECON 30 MCG/0.3 ML IM: CPT

## 2021-02-13 PROCEDURE — 0001A PR COVID VAC PFIZER DIL RECON 30 MCG/0.3 ML IM: CPT

## 2021-03-06 ENCOUNTER — IMMUNIZATION (OUTPATIENT)
Dept: NURSING | Facility: CLINIC | Age: 81
End: 2021-03-06
Attending: INTERNAL MEDICINE
Payer: MEDICARE

## 2021-03-06 PROCEDURE — 91300 PR COVID VAC PFIZER DIL RECON 30 MCG/0.3 ML IM: CPT

## 2021-03-06 PROCEDURE — 0002A PR COVID VAC PFIZER DIL RECON 30 MCG/0.3 ML IM: CPT

## 2021-03-16 ENCOUNTER — DOCUMENTATION ONLY (OUTPATIENT)
Dept: INTERNAL MEDICINE | Facility: CLINIC | Age: 81
End: 2021-03-16

## 2021-03-16 ENCOUNTER — ANTICOAGULATION THERAPY VISIT (OUTPATIENT)
Dept: NURSING | Facility: CLINIC | Age: 81
End: 2021-03-16

## 2021-03-16 DIAGNOSIS — Z79.01 LONG TERM CURRENT USE OF ANTICOAGULANT THERAPY: ICD-10-CM

## 2021-03-16 DIAGNOSIS — I48.20 CHRONIC ATRIAL FIBRILLATION (H): ICD-10-CM

## 2021-03-16 DIAGNOSIS — Z79.01 LONG TERM CURRENT USE OF ANTICOAGULANT THERAPY: Primary | ICD-10-CM

## 2021-03-16 LAB
CAPILLARY BLOOD COLLECTION: NORMAL
INR PPP: 2.3 (ref 0.86–1.14)

## 2021-03-16 PROCEDURE — 85610 PROTHROMBIN TIME: CPT | Performed by: INTERNAL MEDICINE

## 2021-03-16 PROCEDURE — 36416 COLLJ CAPILLARY BLOOD SPEC: CPT | Performed by: INTERNAL MEDICINE

## 2021-03-16 NOTE — PROGRESS NOTES
ANTICOAGULATION MANAGEMENT      William Lechuga due for annual renewal of referral to anticoagulation monitoring. Order pended for your review and signature.      ANTICOAGULATION SUMMARY      Warfarin indication(s)     Atrial fibrillation    Heart valve present?  NO       Current goal range   INR: 2.0-3.0     Goal appropriate for indication? Yes, INR 2-3 appropriate for hx of DVT, PE, hypercoagulable state, Afib, LVAD, or bileaflet AVR without risk factors     Current duration of therapy Indefinite/long term therapy   Time in Therapeutic Range (TTR)  (Goal > 60%) 79.1 %       Office visit with referring provider's group within last year yes on 8/7/20 with FP provider       Pat Olsen RN

## 2021-03-16 NOTE — PROGRESS NOTES
ANTICOAGULATION MANAGEMENT     Patient Name:  William Lechuga  Date:  3/16/2021    ASSESSMENT /SUBJECTIVE:    Today's INR result of 2.30 is therapeutic. Goal INR of 2.0-3.0      Warfarin dose taken: Warfarin taken as instructed    Diet: No new diet changes affecting INR    Medication changes/ interactions: No new medications/supplements affecting INR    Previous INR: Therapeutic     S/S of bleeding or thromboembolism: No    New injury or illness: No    Upcoming surgery, procedure or cardioversion: No    Additional findings: None      PLAN:    Telephone call with William regarding INR result and instructed:     Warfarin Dosing Instructions: Continue your current warfarin dose 2.5 mg Monday and 5 mg all other days    Instructed patient to follow up no later than: 6 weeks  Lab visit scheduled    Education provided: Please call back if any changes to your diet, medications or how you've been taking warfarin      William verbalizes understanding and agrees to warfarin dosing plan.    Instructed to call the Anticoagulation Clinic for any changes, questions or concerns. (#[phone])        Pat Olsen RN      OBJECTIVE:  Recent labs: (last 7 days)     21  0824   INR 2.30*         No question data found.  Anticoagulation Summary  As of 3/16/2021    INR goal:  2.0-3.0   TTR:  79.1 % (1 y)   INR used for dosin.30 (3/16/2021)   Warfarin maintenance plan:  2.5 mg (5 mg x 0.5) every Mon; 5 mg (5 mg x 1) all other days   Full warfarin instructions:  2.5 mg every Mon; 5 mg all other days   Weekly warfarin total:  32.5 mg   Plan last modified:  Arminda Liu (2020)   Next INR check:     Priority:  Maintenance   Target end date:  Indefinite    Indications    Long-term (current) use of anticoagulants [Z79.01] [Z79.01]  Chronic atrial fibrillation (H) [I48.20]             Anticoagulation Episode Summary     INR check location:      Preferred lab:      Send INR reminders to:  [facility] BLOOD    Comments:         Anticoagulation Care Providers     Provider Role Specialty Phone number    Prakash Gonzalez MD Referring Internal Medicine [phone]    Narcisa Mcmahon MD Responsible [facility] [phone]         ANTICOAGULATION FOLLOW-UP CLINIC VISIT    Patient Name:  William Lechuga  Date:  3/16/2021  Contact Type:  Telephone    SUBJECTIVE:         OBJECTIVE    Recent labs: (last 7 days)     21  0824   INR 2.30*       ASSESSMENT / PLAN  INR assessment THER    Recheck INR In: 6 WEEKS    INR Location Clinic      Anticoagulation Summary  As of 3/16/2021    INR goal:  2.0-3.0   TTR:  79.1 % (1 y)   INR used for dosin.30 (3/16/2021)   Warfarin maintenance plan:  2.5 mg (5 mg x 0.5) every Mon; 5 mg (5 mg x 1) all other days   Full warfarin instructions:  2.5 mg every Mon; 5 mg all other days   Weekly warfarin total:  32.5 mg   No change documented:  Pat Olsen RN   Plan last modified:  Arminda Liu (2020)   Next INR check:  2021   Priority:  Maintenance   Target end date:  Indefinite    Indications    Long-term (current) use of anticoagulants [Z79.01] [Z79.01]  Chronic atrial fibrillation (H) [I48.20]             Anticoagulation Episode Summary     INR check location:      Preferred lab:      Send INR reminders to:  [facility] BLOOD    Comments:        Anticoagulation Care Providers     Provider Role Specialty Phone number    Prakash Gonzalez MD Referring Internal Medicine [phone]    Narcisa Mcmahon MD Responsible [facility] [phone]            See the Encounter Report to view Anticoagulation Flowsheet and Dosing Calendar (Go to Encounters tab in chart review, and find the Anticoagulation Therapy Visit)        Pat Olsen RN

## 2021-04-21 ENCOUNTER — ANTICOAGULATION THERAPY VISIT (OUTPATIENT)
Dept: NURSING | Facility: CLINIC | Age: 81
End: 2021-04-21

## 2021-04-21 DIAGNOSIS — I48.20 CHRONIC ATRIAL FIBRILLATION (H): ICD-10-CM

## 2021-04-21 DIAGNOSIS — Z79.01 LONG TERM CURRENT USE OF ANTICOAGULANT THERAPY: ICD-10-CM

## 2021-04-21 LAB
CAPILLARY BLOOD COLLECTION: NORMAL
INR PPP: 2.4 (ref 0.86–1.14)

## 2021-04-21 PROCEDURE — 85610 PROTHROMBIN TIME: CPT | Performed by: INTERNAL MEDICINE

## 2021-04-21 PROCEDURE — 36416 COLLJ CAPILLARY BLOOD SPEC: CPT | Performed by: INTERNAL MEDICINE

## 2021-04-21 NOTE — PROGRESS NOTES
ANTICOAGULATION MANAGEMENT     Patient Name:  William Lechuga  Date:  2021    ASSESSMENT /SUBJECTIVE:    Today's INR result of 2.4 is therapeutic. Goal INR of 2.0-3.0      Warfarin dose taken: Warfarin taken as instructed    Diet: No new diet changes affecting INR    Medication changes/ interactions: No new medications/supplements affecting INR    Previous INR: Therapeutic     S/S of bleeding or thromboembolism: No    New injury or illness: No    Upcoming surgery, procedure or cardioversion: No    Additional findings: None      PLAN:    Telephone call with William regarding INR result and instructed:     Warfarin Dosing Instructions: Continue your current warfarin dose 2.5 mg Monday and 5 mg all other days    Instructed patient to follow up no later than: 6 weeks  Lab visit scheduled    Education provided: Target INR goal and significance of current INR result, Importance of therapeutic range, Importance of following up for INR monitoring at instructed interval and Importance of taking warfarin as instructed      William verbalizes understanding and agrees to warfarin dosing plan.    Instructed to call the Anticoagulation Clinic for any changes, questions or concerns. (#[phone])        Aury Hernandez RN      OBJECTIVE:  Recent labs: (last 7 days)     21  1118   INR 2.40*         No question data found.  Anticoagulation Summary  As of 2021    INR goal:  2.0-3.0   TTR:  79.1 % (1 y)   INR used for dosin.40 (2021)   Warfarin maintenance plan:  2.5 mg (5 mg x 0.5) every Mon; 5 mg (5 mg x 1) all other days   Full warfarin instructions:  2.5 mg every Mon; 5 mg all other days   Weekly warfarin total:  32.5 mg   No change documented:  Aury Hernandez RN   Plan last modified:  Arminda Liu (2020)   Next INR check:  2021   Priority:  Maintenance   Target end date:  Indefinite    Indications    Long-term (current) use of anticoagulants [Z79.01] [Z79.01]  Chronic atrial  fibrillation (H) [I48.20]             Anticoagulation Episode Summary     INR check location:      Preferred lab:      Send INR reminders to:  [facility] BLOOD    Comments:        Anticoagulation Care Providers     Provider Role Specialty Phone number    Prakash Gonzalez MD Referring Internal Medicine [phone]    Lashay Donahue APRN CNP Referring Family Medicine [phone]    Narcisa Mcmahon MD [facility] [phone]

## 2021-07-14 DIAGNOSIS — I48.20 CHRONIC ATRIAL FIBRILLATION (H): Primary | ICD-10-CM

## 2021-07-19 ENCOUNTER — LAB (OUTPATIENT)
Dept: LAB | Facility: CLINIC | Age: 81
End: 2021-07-19
Payer: MEDICARE

## 2021-07-19 ENCOUNTER — ANTICOAGULATION THERAPY VISIT (OUTPATIENT)
Dept: ANTICOAGULATION | Facility: CLINIC | Age: 81
End: 2021-07-19

## 2021-07-19 DIAGNOSIS — I48.20 CHRONIC ATRIAL FIBRILLATION (H): ICD-10-CM

## 2021-07-19 DIAGNOSIS — Z79.01 LONG TERM CURRENT USE OF ANTICOAGULANT THERAPY: Primary | ICD-10-CM

## 2021-07-19 LAB — INR BLD: 2.9 (ref 0.9–1.1)

## 2021-07-19 PROCEDURE — 85610 PROTHROMBIN TIME: CPT

## 2021-07-19 PROCEDURE — 36416 COLLJ CAPILLARY BLOOD SPEC: CPT

## 2021-07-19 NOTE — PROGRESS NOTES
ANTICOAGULATION MANAGEMENT     William Lechuga 80 year old male is on warfarin with therapeutic INR result. (Goal INR 2.0-3.0)    Recent labs: (last 7 days)     07/19/21  0841   INR 2.9*       ASSESSMENT     Source(s): Patient/Caregiver Call       Warfarin doses taken: Warfarin taken as instructed    Diet: No new diet changes identified    New illness, injury, or hospitalization: No    Medication/supplement changes: None noted    Signs or symptoms of bleeding or clotting: No    Previous INR: Therapeutic last 2(+) visits    Additional findings: had last INR done at [place] a month ago and was therapeutic     PLAN     Recommended plan for no diet, medication or health factor changes affecting INR     Dosing Instructions: Continue your current warfarin dose with next INR in 6 weeks       Summary  As of 7/19/2021    Full warfarin instructions:  2.5 mg every Mon; 5 mg all other days   Next INR check:  8/30/2021             Telephone call with William who verbalizes understanding and agrees to plan and who agrees to plan and repeated back plan correctly    Lab visit scheduled    Education provided: Please call back if any changes to your diet, medications or how you've been taking warfarin and Contact [phone]  with any changes, questions or concerns.     Plan made per [facility] anticoagulation protocol    Pat Olsen, RN  Anticoagulation Clinic  7/19/2021    _______________________________________________________________________     Anticoagulation Episode Summary     Current INR goal:  2.0-3.0   TTR:  79.7 % (1 y)   Target end date:  Indefinite   Send INR reminders to:  [facility] BLOOD    Indications    Long-term (current) use of anticoagulants [Z79.01] [Z79.01]  Chronic atrial fibrillation (H) [I48.20]           Comments:           Anticoagulation Care Providers     Provider Role Specialty Phone number    Prakash Gonzalez MD Referring Internal Medicine [phone]    Lashay Donahue APRN CNP Referring Family  Medicine [phone]    Narcisa Mcmahon MD [facility] [phone]

## 2021-08-31 ENCOUNTER — LAB (OUTPATIENT)
Dept: LAB | Facility: CLINIC | Age: 81
End: 2021-08-31
Payer: MEDICARE

## 2021-08-31 ENCOUNTER — ANTICOAGULATION THERAPY VISIT (OUTPATIENT)
Dept: ANTICOAGULATION | Facility: CLINIC | Age: 81
End: 2021-08-31

## 2021-08-31 DIAGNOSIS — Z79.01 LONG TERM CURRENT USE OF ANTICOAGULANT THERAPY: Primary | ICD-10-CM

## 2021-08-31 DIAGNOSIS — I48.20 CHRONIC ATRIAL FIBRILLATION (H): ICD-10-CM

## 2021-08-31 LAB — INR BLD: 2.7 (ref 0.9–1.1)

## 2021-08-31 PROCEDURE — 36416 COLLJ CAPILLARY BLOOD SPEC: CPT

## 2021-08-31 PROCEDURE — 85610 PROTHROMBIN TIME: CPT

## 2021-08-31 NOTE — PROGRESS NOTES
ANTICOAGULATION MANAGEMENT     William Lechuga 80 year old male is on warfarin with therapeutic INR result. (Goal INR 2.0-3.0)    Recent labs: (last 7 days)     08/31/21  0847   INR 2.7*       ASSESSMENT     Source(s): Chart Review and Patient/Caregiver Call       Warfarin doses taken: Warfarin taken as instructed    Diet: No new diet changes identified    New illness, injury, or hospitalization: No    Medication/supplement changes: None noted    Signs or symptoms of bleeding or clotting: No    Previous INR: Therapeutic last 2(+) visits    Additional findings: Patient expressed PCP is leaving the clinic and inquired about how it affects his INR management going forward. RN explained that the patient should establish a new PCP, if he needs assistance the clinic can help or he can go to [employer] website which will list the medical providers at each of the clinics.      PLAN     Recommended plan for no diet, medication or health factor changes affecting INR     Dosing Instructions: Continue your current warfarin dose with next INR in 6 weeks       Summary  As of 8/31/2021    Full warfarin instructions:  2.5 mg every Mon; 5 mg all other days   Next INR check:  10/12/2021             Telephone call with William who verbalizes understanding and agrees to plan    Lab visit scheduled    Education provided: Goal range and significance of current result, Importance of therapeutic range and Contact [phone]  with any changes, questions or concerns.     Plan made per [facility] anticoagulation protocol    Obi Reaves RN  Anticoagulation Clinic  8/31/2021    _______________________________________________________________________     Anticoagulation Episode Summary     Current INR goal:  2.0-3.0   TTR:  88.2 % (1 y)   Target end date:  Indefinite   Send INR reminders to:  [facility] BLOOD    Indications    Long-term (current) use of anticoagulants [Z79.01] [Z79.01]  Chronic atrial fibrillation (H) [I48.20]           Comments:            Anticoagulation Care Providers     Provider Role Specialty Phone number    Prakash Gonzalez MD Referring Internal Medicine [phone]    Lashay Donahue APRN CNP Referring Family Medicine [phone]    Narcisa Mcmahon MD [facility] [phone]

## 2021-09-26 ENCOUNTER — HEALTH MAINTENANCE LETTER (OUTPATIENT)
Age: 81
End: 2021-09-26

## 2021-10-20 ENCOUNTER — MYC MEDICAL ADVICE (OUTPATIENT)
Dept: FAMILY MEDICINE | Facility: CLINIC | Age: 81
End: 2021-10-20

## 2021-10-20 ENCOUNTER — LAB (OUTPATIENT)
Dept: LAB | Facility: CLINIC | Age: 81
End: 2021-10-20
Payer: MEDICARE

## 2021-10-20 ENCOUNTER — ANTICOAGULATION THERAPY VISIT (OUTPATIENT)
Dept: ANTICOAGULATION | Facility: CLINIC | Age: 81
End: 2021-10-20

## 2021-10-20 DIAGNOSIS — Z79.01 LONG TERM CURRENT USE OF ANTICOAGULANT THERAPY: Primary | ICD-10-CM

## 2021-10-20 DIAGNOSIS — I48.20 CHRONIC ATRIAL FIBRILLATION (H): ICD-10-CM

## 2021-10-20 LAB — INR BLD: 2.7 (ref 0.9–1.1)

## 2021-10-20 PROCEDURE — 85610 PROTHROMBIN TIME: CPT

## 2021-10-20 PROCEDURE — 36415 COLL VENOUS BLD VENIPUNCTURE: CPT

## 2021-10-20 NOTE — PROGRESS NOTES
ANTICOAGULATION MANAGEMENT     William Lechuga 81 year old male is on warfarin with therapeutic INR result. (Goal INR 2.0-3.0)    Recent labs: (last 7 days)     10/20/21  0840   INR 2.7*       ASSESSMENT     Source(s): Chart Review and Patient/Caregiver Call       Warfarin doses taken: Warfarin taken as instructed    Diet: No new diet changes identified    New illness, injury, or hospitalization: No    Medication/supplement changes: None noted    Signs or symptoms of bleeding or clotting: No    Previous INR: Therapeutic last 2(+) visits    Additional findings: None reminded to schedule apt with new provider to establish care and he agrees.     PLAN     Recommended plan for no diet, medication or health factor changes affecting INR     Dosing Instructions: Continue your current warfarin dose with next INR in 6 weeks       Summary  As of 10/20/2021    Full warfarin instructions:  2.5 mg every Mon; 5 mg all other days   Next INR check:  11/30/2021             Telephone call with William who verbalizes understanding and agrees to plan    Lab visit scheduled    Education provided: Please call back if any changes to your diet, medications or how you've been taking warfarin and Contact [phone]  with any changes, questions or concerns.     Plan made per [facility] anticoagulation protocol    Pat Olsen RN  Anticoagulation Clinic  10/20/2021    _______________________________________________________________________     Anticoagulation Episode Summary     Current INR goal:  2.0-3.0   TTR:  95.3 % (1 y)   Target end date:  Indefinite   Send INR reminders to:  [facility]    Indications    Long-term (current) use of anticoagulants [Z79.01] [Z79.01]  Chronic atrial fibrillation (H) [I48.20]           Comments:           Anticoagulation Care Providers     Provider Role Specialty Phone number    Lashay Donahue APRN [facility] [phone]

## 2021-11-23 ENCOUNTER — LAB (OUTPATIENT)
Dept: LAB | Facility: CLINIC | Age: 81
End: 2021-11-23
Payer: MEDICARE

## 2021-11-23 ENCOUNTER — ANTICOAGULATION THERAPY VISIT (OUTPATIENT)
Dept: ANTICOAGULATION | Facility: CLINIC | Age: 81
End: 2021-11-23

## 2021-11-23 DIAGNOSIS — I48.20 CHRONIC ATRIAL FIBRILLATION (H): ICD-10-CM

## 2021-11-23 DIAGNOSIS — Z79.01 LONG TERM CURRENT USE OF ANTICOAGULANT THERAPY: Primary | ICD-10-CM

## 2021-11-23 LAB — INR BLD: 2.4 (ref 0.9–1.1)

## 2021-11-23 PROCEDURE — 36415 COLL VENOUS BLD VENIPUNCTURE: CPT

## 2021-11-23 PROCEDURE — 85610 PROTHROMBIN TIME: CPT

## 2021-11-23 NOTE — PROGRESS NOTES
ANTICOAGULATION MANAGEMENT     William Lechuga 81 year old male is on warfarin with therapeutic INR result. (Goal INR 2.0-3.0)    Recent labs: (last 7 days)     11/23/21  1103   INR 2.4*       ASSESSMENT     Source(s): Chart Review and Patient/Caregiver Call       Warfarin doses taken: Warfarin taken as instructed    Diet: No new diet changes identified    New illness, injury, or hospitalization: No    Medication/supplement changes: None noted    Signs or symptoms of bleeding or clotting: No    Previous INR: Therapeutic last 2(+) visits    Additional findings: None     PLAN     Recommended plan for no diet, medication or health factor changes affecting INR     Dosing Instructions: Continue your current warfarin dose with next INR in 6 weeks       Summary  As of 11/23/2021    Full warfarin instructions:  2.5 mg every Mon; 5 mg all other days   Next INR check:  1/4/2022             Telephone call with William who verbalizes understanding and agrees to plan    Lab visit scheduled    Education provided: None required    Plan made per [facility] anticoagulation protocol    Amanda Siegel, RN  Anticoagulation Clinic  11/23/2021    _______________________________________________________________________     Anticoagulation Episode Summary     Current INR goal:  2.0-3.0   TTR:  96.6 % (1 y)   Target end date:  Indefinite   Send INR reminders to:  [facility]    Indications    Long-term (current) use of anticoagulants [Z79.01] [Z79.01]  Chronic atrial fibrillation (H) [I48.20]           Comments:           Anticoagulation Care Providers     Provider Role Specialty Phone number    Lashay Donahue APRN [facility] [phone]

## 2021-12-03 NOTE — PROGRESS NOTES
ANTICOAGULATION FOLLOW-UP CLINIC VISIT    Patient Name:  William Lechuga  Date:  8/17/2018  Contact Type:  Face to Face    SUBJECTIVE:     Patient Findings     Positives No Problem Findings    Comments Therapeutic after one day dose adjustment. Resume previous weekly schedule.            OBJECTIVE    INR Protime   Date Value Ref Range Status   08/17/2018 2.3 (A) 0.86 - 1.14 Final       ASSESSMENT / PLAN  INR assessment THER    Recheck INR In: 6 WEEKS    INR Location Clinic      Anticoagulation Summary as of 8/17/2018     INR goal 2.0-3.0   Today's INR 2.3   Warfarin maintenance plan 7.5 mg (5 mg x 1.5) on Mon; 5 mg (5 mg x 1) all other days   Full warfarin instructions 7.5 mg on Mon; 5 mg all other days   Weekly warfarin total 37.5 mg   No change documented Pat Olsen RN   Plan last modified Ariana Arriaga (3/29/2016)   Next INR check 10/1/2018   Target end date     Indications   Long-term (current) use of anticoagulants [Z79.01] [Z79.01]  Chronic atrial fibrillation (H) [I48.2]         Anticoagulation Episode Summary     INR check location     Preferred lab     Send INR reminders to [facility]    Comments       Anticoagulation Care Providers     Provider Role Specialty Phone number    Narcisa Mcmahon MD [facility] [phone]            See the Encounter Report to view Anticoagulation Flowsheet and Dosing Calendar (Go to Encounters tab in chart review, and find the Anticoagulation Therapy Visit)        Pat Olsen RN                n/a

## 2021-12-30 ENCOUNTER — TELEPHONE (OUTPATIENT)
Dept: INTERNAL MEDICINE | Facility: CLINIC | Age: 81
End: 2021-12-30

## 2021-12-30 ENCOUNTER — ANTICOAGULATION THERAPY VISIT (OUTPATIENT)
Dept: ANTICOAGULATION | Facility: CLINIC | Age: 81
End: 2021-12-30

## 2021-12-30 ENCOUNTER — LAB (OUTPATIENT)
Dept: LAB | Facility: CLINIC | Age: 81
End: 2021-12-30
Payer: MEDICARE

## 2021-12-30 DIAGNOSIS — I48.20 CHRONIC ATRIAL FIBRILLATION (H): ICD-10-CM

## 2021-12-30 LAB — INR BLD: 2.5 (ref 0.9–1.1)

## 2021-12-30 PROCEDURE — 85610 PROTHROMBIN TIME: CPT

## 2021-12-30 PROCEDURE — 36416 COLLJ CAPILLARY BLOOD SPEC: CPT

## 2021-12-30 NOTE — PROGRESS NOTES
Anticoagulation Management    Unable to reach William today.    Today's INR result of 2.5 is therapeutic (goal INR of 2.0-3.0).  Result received from: Clinic Lab    Follow up required to assess for changes     Left a voicemail advising pt to continue taking warfarin 2.5 mg on Mondays and 5 mg all other days. Requested a call back.    Transfer to [phone]      Anticoagulation clinic to follow up    Albertina Lopez RN

## 2021-12-30 NOTE — TELEPHONE ENCOUNTER
,      Pt reports that he was 's patient who has since retired. Pt states that he has met you before and would like to establish care with you. He is currently scheduled to see you on 1/18/22 in clinic.    Pt is planning to be in [place] from 2/1/22-3/31/22. I have created a lab letter for your review dated 12/30/21. If approved, please sign and add your NPI number on it if appropriate then leave at the  for pt to  on 1/18/22 when he is in clinic for the next INR and visit with you.      Thanks so much!    Albertina, RN ([facility] nurse)

## 2021-12-30 NOTE — PROGRESS NOTES
ANTICOAGULATION MANAGEMENT     William Lechuga 81 year old male is on warfarin with therapeutic INR result. (Goal INR 2.0-3.0)    Recent labs: (last 7 days)     12/30/21  1031   INR 2.5*       ASSESSMENT     Source(s): Chart Review and Patient/Caregiver Call       Warfarin doses taken: Warfarin taken as instructed    Diet: No new diet changes identified    New illness, injury, or hospitalization: No    Medication/supplement changes: None noted    Signs or symptoms of bleeding or clotting: No    Previous INR: Therapeutic last 2(+) visits    Additional findings: Pt scheduled to be in [place] from 2/1/22-3/31/22. A lab letter has been created for review by . Pt scheduled to establish care with her on 1/18/22.     PLAN     Recommended plan for no diet, medication or health factor changes affecting INR     Dosing Instructions: Continue your current warfarin dose with next INR in 3 weeks       Summary  As of 12/30/2021    Full warfarin instructions:  2.5 mg every Mon; 5 mg all other days   Next INR check:  1/18/2022             Telephone call with William who verbalizes understanding and agrees to plan    Lab visit scheduled    Education provided: Importance of notifying clinic for changes in medications; a sooner lab recheck maybe needed. and Contact [phone]  with any changes, questions or concerns.     Plan made per [facility] anticoagulation protocol    Albertina Lopez RN  Anticoagulation Clinic  12/30/2021    _______________________________________________________________________     Anticoagulation Episode Summary     Current INR goal:  2.0-3.0   TTR:  100.0 % (1 y)   Target end date:  Indefinite   Send INR reminders to:  [facility]    Indications    Long-term (current) use of anticoagulants [Z79.01] [Z79.01]  Chronic atrial fibrillation (H) [I48.20]           Comments:           Anticoagulation Care Providers     Provider Role Specialty Phone number    Lashay Donahue, DARIANA CNP Responsible Family  Medicine [phone]

## 2021-12-30 NOTE — LETTER
December 30, 2021      RE: William Lechuga  [address] [place] [address]    To Whom It May Concern:    This patient is on the medication warfarin which needs lab monitoring with ongoing INR level checks to be drawn through lab about every 2-6 weeks depending on the advisement of the anticoagulation clinic under my supervision. There are potentially serious risks when taking warfarin without careful monitoring.    I authorize the order for INR checks as needed through an outside lab.   Please fax results to [phone]) or call ([phone]) with results or questions.      Sincerely,      Arelis Alexandra MD        Long-term (current) use of anticoagulants [Z79.01] [Z79.01]  Chronic atrial fibrillation (H) [I48.20]

## 2022-01-14 DIAGNOSIS — G47.33 OBSTRUCTIVE SLEEP APNEA (ADULT) (PEDIATRIC): Primary | ICD-10-CM

## 2022-01-16 ENCOUNTER — HEALTH MAINTENANCE LETTER (OUTPATIENT)
Age: 82
End: 2022-01-16

## 2022-01-18 ENCOUNTER — ANTICOAGULATION THERAPY VISIT (OUTPATIENT)
Dept: ANTICOAGULATION | Facility: CLINIC | Age: 82
End: 2022-01-18

## 2022-01-18 ENCOUNTER — OFFICE VISIT (OUTPATIENT)
Dept: FAMILY MEDICINE | Facility: CLINIC | Age: 82
End: 2022-01-18
Payer: MEDICARE

## 2022-01-18 ENCOUNTER — LAB (OUTPATIENT)
Dept: LAB | Facility: CLINIC | Age: 82
End: 2022-01-18
Payer: MEDICARE

## 2022-01-18 VITALS
SYSTOLIC BLOOD PRESSURE: 120 MMHG | HEART RATE: 62 BPM | TEMPERATURE: 97.6 F | BODY MASS INDEX: 31.18 KG/M2 | OXYGEN SATURATION: 98 % | WEIGHT: 230.2 LBS | RESPIRATION RATE: 16 BRPM | DIASTOLIC BLOOD PRESSURE: 74 MMHG | HEIGHT: 72 IN

## 2022-01-18 DIAGNOSIS — E89.0 POSTSURGICAL HYPOTHYROIDISM: ICD-10-CM

## 2022-01-18 DIAGNOSIS — I48.20 CHRONIC ATRIAL FIBRILLATION (H): ICD-10-CM

## 2022-01-18 DIAGNOSIS — G47.33 OSA (OBSTRUCTIVE SLEEP APNEA): ICD-10-CM

## 2022-01-18 DIAGNOSIS — Z79.01 CURRENT USE OF LONG TERM ANTICOAGULATION: Primary | ICD-10-CM

## 2022-01-18 DIAGNOSIS — I10 HYPERTENSION GOAL BP (BLOOD PRESSURE) < 140/90: Primary | ICD-10-CM

## 2022-01-18 DIAGNOSIS — C73 MALIGNANT NEOPLASM OF THYROID GLAND (H): ICD-10-CM

## 2022-01-18 DIAGNOSIS — Z79.01 LONG TERM CURRENT USE OF ANTICOAGULANT THERAPY: ICD-10-CM

## 2022-01-18 DIAGNOSIS — E78.5 HYPERLIPIDEMIA LDL GOAL <130: ICD-10-CM

## 2022-01-18 LAB — INR BLD: 2.1 (ref 0.9–1.1)

## 2022-01-18 PROCEDURE — 99214 OFFICE O/P EST MOD 30 MIN: CPT | Performed by: FAMILY MEDICINE

## 2022-01-18 PROCEDURE — 85610 PROTHROMBIN TIME: CPT

## 2022-01-18 PROCEDURE — 36416 COLLJ CAPILLARY BLOOD SPEC: CPT

## 2022-01-18 RX ORDER — AMLODIPINE BESYLATE 10 MG/1
10 TABLET ORAL DAILY
Qty: 90 TABLET | Refills: 3 | Status: SHIPPED | OUTPATIENT
Start: 2022-01-18 | End: 2022-08-09

## 2022-01-18 ASSESSMENT — MIFFLIN-ST. JEOR: SCORE: 1792.18

## 2022-01-18 NOTE — PROGRESS NOTES
ANTICOAGULATION MANAGEMENT     William Lechuga 81 year old male is on warfarin with therapeutic INR result. (Goal INR 2.0-3.0)    Recent labs: (last 7 days)     01/18/22  1027   INR 2.1*       ASSESSMENT     Source(s): Chart Review and Patient/Caregiver Call       Warfarin doses taken: Warfarin taken as instructed    Diet: Increased greens/vitamin K in diet; plans to resume previous intake    New illness, injury, or hospitalization: No    Medication/supplement changes: None noted    Signs or symptoms of bleeding or clotting: No    Previous INR: Therapeutic last 2(+) visits    Additional findings: leaves for [place] on 2/1 x 2 months.  He has the order to check an INR when he is in [place].  Next check date discussed with patient.      PLAN     Recommended plan for temporary change(s) affecting INR     Dosing Instructions: Continue your current warfarin dose with next INR in 6 weeks       Summary  As of 1/18/2022    Full warfarin instructions:  2.5 mg every Mon; 5 mg all other days   Next INR check:  3/1/2022             Telephone call with William who verbalizes understanding and agrees to plan    Patient using outside facility for labs    Education provided: Monitoring for bleeding signs and symptoms, Monitoring for clotting signs and symptoms and Contact [phone]  with any changes, questions or concerns.     Plan made per [facility] anticoagulation protocol    Sultana Hedrick RN  Anticoagulation Clinic  1/18/2022    _______________________________________________________________________     Anticoagulation Episode Summary     Current INR goal:  2.0-3.0   TTR:  100.0 % (1 y)   Target end date:  Indefinite   Send INR reminders to:  [facility]    Indications    Current use of long term anticoagulation [Z79.01]  Chronic atrial fibrillation (H) [I48.20]           Comments:           Anticoagulation Care Providers     Provider Role Specialty Phone number    aLshay Donahue APRN [facility]  [phone]

## 2022-01-18 NOTE — PROGRESS NOTES
"  Assessment & Plan     Hypertension goal BP (blood pressure) < 140/90, uncontrolled  - Increase dose: amLODIPine (NORVASC) 10 MG tablet  Dispense: 90 tablet; Refill: 3  - Keep BP log at home.     Chronic atrial fibrillation (H) on Long-term (current) use of anticoagulants [Z79.01]  - Coumadin.   - Enrolled in the Anticoagulation clinic    Hyperlipidemia LDL goal <130  - On Crestor 10 mg/day.     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   - Following with the [facility]    NIELS (obstructive sleep apnea)- moderate (AHI 15)  - On CPAP nightly. Doing well.        BMI:   Estimated body mass index is 31.22 kg/m  as calculated from the following:    Height as of this encounter: 1.829 m (6').    Weight as of this encounter: 104.4 kg (230 lb 3.2 oz).   Weight management plan: Discussed healthy diet and exercise guidelines        Return in about 1 month (around 2/18/2022) for Virtual Visit., Medication check.    Arelis Alexandra MD  [facility] JEREMI Thomas is a 81 year old who presents for the following health issues     HPI     Establish Care  He is seen by an \"executive medicine provider\" through [facility] and had Dr. Gonzalez as PCP.    All of his current medications are being prescribed by Executive Medicine provider (Nicolas Lyn).      Has noticed an increase in blood pressure and states he needs a PCP to keep his BP in control.  He does plan to continue seeing Executive Medicine provider annually. .     Hypertension Follow-up  Currently on Amlodipine 5 mg/day + Lisinopril 40 mg/day- tolerating well.  States that his BP has been creeping up in the past few months. Home BPs ranging in the 140s-150s/70s- 80s.  Admits to drinking about 2-4 beers/night especially over the holidays.     Do you check your blood pressure regularly outside of the clinic? Yes     Are you following a low salt diet? " Yes    Are your blood pressures ever more than 140 on the top number (systolic) OR more   than 90 on the bottom number (diastolic), for example 140/90? Yes      A-FIB - Patient has a longstanding history of chronic A-fib currently on rate control. Current treatment regimen includes Warfarin for stroke prevention and denies significant symptoms of lightheadedness, palpitations or dyspnea.     HYPERLIPIDEMIA - Patient has a long history of significant Hyperlipidemia requiring medication for treatment with recent good control. Patient reports no problems or side effects with the medication- Crestor. Had labs done last year.       Has a known history of malignant neoplasm of Hurthle cell s/p thyroidectomy.   Currently on Levothyroxine. Euthyroid.     History of NIELS, uses a CPAP nightly. No concerns.     HEALTH CARE MAINTENANCE: Has annual executive physicals at the [facility].     Review of Systems   Constitutional, HEENT, cardiovascular, pulmonary, gi and gu systems are negative, except as otherwise noted.      Objective    /70   Pulse 62   Temp 97.6  F (36.4  C) (Tympanic)   Resp 16   Ht 1.829 m (6')   Wt 104.4 kg (230 lb 3.2 oz)   SpO2 98%   BMI 31.22 kg/m    Body mass index is 31.22 kg/m .  Physical Exam   GENERAL: healthy, alert and no distress  NECK: no adenopathy, no asymmetry, masses, or scars and thyroid normal to palpation  RESP: lungs clear to auscultation - no rales, rhonchi or wheezes  CV: regular rate and rhythm, normal S1 S2, no S3 or S4, no murmur, click or rub, no peripheral edema and peripheral pulses strong  ABDOMEN: soft, nontender, no hepatosplenomegaly, no masses and bowel sounds normal  MS: no gross musculoskeletal defects noted, no edema    DATA  Previous labs reviewed.

## 2022-03-07 ENCOUNTER — TRANSFERRED RECORDS (OUTPATIENT)
Dept: HEALTH INFORMATION MANAGEMENT | Facility: CLINIC | Age: 82
End: 2022-03-07
Payer: MEDICARE

## 2022-03-07 LAB — INR (EXTERNAL): 2.4 (ref 0.9–1.2)

## 2022-03-08 ENCOUNTER — ANTICOAGULATION THERAPY VISIT (OUTPATIENT)
Dept: ANTICOAGULATION | Facility: CLINIC | Age: 82
End: 2022-03-08
Payer: MEDICARE

## 2022-03-08 DIAGNOSIS — I48.20 CHRONIC ATRIAL FIBRILLATION (H): ICD-10-CM

## 2022-03-08 DIAGNOSIS — Z79.01 CURRENT USE OF LONG TERM ANTICOAGULATION: Primary | ICD-10-CM

## 2022-03-08 NOTE — PROGRESS NOTES
ANTICOAGULATION MANAGEMENT     William Lechuga 81 year old male is on warfarin with therapeutic INR result. (Goal INR 2.0-3.0)    Recent labs: (last 7 days)     03/07/22  0000   INR 2.4       ASSESSMENT       Source(s): Chart Review       Warfarin doses taken: Warfarin taken as instructed    Diet: No new diet changes identified    New illness, injury, or hospitalization: No    Medication/supplement changes: None noted    Signs or symptoms of bleeding or clotting: No    Previous INR: Therapeutic last 2(+) visits    Additional findings: None       PLAN     Recommended plan for no diet, medication or health factor changes affecting INR     Dosing Instructions: Continue your current warfarin dose with next INR in 6 weeks       Summary  As of 3/8/2022    Full warfarin instructions:  2.5 mg every Mon; 5 mg all other days   Next INR check:  4/19/2022             Telephone call with William who verbalizes understanding and agrees to plan    Lab visit scheduled    Education provided: Please call back if any changes to your diet, medications or how you've been taking warfarin    Plan made per [facility] anticoagulation protocol    Nikky Lockhart, RN  Anticoagulation Clinic  3/8/2022    _______________________________________________________________________     Anticoagulation Episode Summary     Current INR goal:  2.0-3.0   TTR:  100.0 % (1 y)   Target end date:  Indefinite   Send INR reminders to:  [facility]    Indications    Current use of long term anticoagulation [Z79.01]  Chronic atrial fibrillation (H) [I48.20]           Comments:           Anticoagulation Care Providers     Provider Role Specialty Phone number    Lashay Donahue, DARIANA [facility] [phone]

## 2022-03-23 ENCOUNTER — DOCUMENTATION ONLY (OUTPATIENT)
Dept: FAMILY MEDICINE | Facility: CLINIC | Age: 82
End: 2022-03-23
Payer: MEDICARE

## 2022-03-23 DIAGNOSIS — I48.20 CHRONIC ATRIAL FIBRILLATION (H): Primary | ICD-10-CM

## 2022-03-23 NOTE — PROGRESS NOTES
ANTICOAGULATION MANAGEMENT      William Lechuga due for annual renewal of referral to anticoagulation monitoring. Order pended for your review and signature.      ANTICOAGULATION SUMMARY      Warfarin indication(s)     Atrial fibrillation    Heart valve present?  NO       Current goal range   INR: 2.0-3.0     Goal appropriate for indication? Yes, INR 2-3 appropriate for hx of DVT, PE, hypercoagulable state, Afib, LVAD, or bileaflet AVR without risk factors     Current duration of therapy Indefinite/long term therapy   Time in Therapeutic Range (TTR)  (Goal > 60%) 100%       Office visit with referring provider's group within last year yes on 1/18/22       Pat Olsen RN

## 2022-04-07 ENCOUNTER — DOCUMENTATION ONLY (OUTPATIENT)
Dept: SLEEP MEDICINE | Facility: CLINIC | Age: 82
End: 2022-04-07
Payer: MEDICARE

## 2022-04-07 NOTE — PROGRESS NOTES
STM Recheck:  Called patient he bought his CPAP machine to [place] for a download for his Providers visit next week.

## 2022-04-12 ENCOUNTER — VIRTUAL VISIT (OUTPATIENT)
Dept: SLEEP MEDICINE | Facility: CLINIC | Age: 82
End: 2022-04-12
Payer: MEDICARE

## 2022-04-12 VITALS
WEIGHT: 230 LBS | HEIGHT: 72 IN | BODY MASS INDEX: 31.15 KG/M2 | SYSTOLIC BLOOD PRESSURE: 135 MMHG | DIASTOLIC BLOOD PRESSURE: 70 MMHG

## 2022-04-12 DIAGNOSIS — G47.33 OSA (OBSTRUCTIVE SLEEP APNEA): Primary | ICD-10-CM

## 2022-04-12 PROCEDURE — 99213 OFFICE O/P EST LOW 20 MIN: CPT | Mod: 95 | Performed by: PHYSICIAN ASSISTANT

## 2022-04-12 ASSESSMENT — PAIN SCALES - GENERAL: PAINLEVEL: NO PAIN (0)

## 2022-04-12 ASSESSMENT — SLEEP AND FATIGUE QUESTIONNAIRES
HOW LIKELY ARE YOU TO NOD OFF OR FALL ASLEEP IN A CAR, WHILE STOPPED FOR A FEW MINUTES IN TRAFFIC: WOULD NEVER DOZE
HOW LIKELY ARE YOU TO NOD OFF OR FALL ASLEEP WHEN YOU ARE A PASSENGER IN A CAR FOR AN HOUR WITHOUT A BREAK: WOULD NEVER DOZE
HOW LIKELY ARE YOU TO NOD OFF OR FALL ASLEEP WHILE SITTING INACTIVE IN A PUBLIC PLACE: WOULD NEVER DOZE
HOW LIKELY ARE YOU TO NOD OFF OR FALL ASLEEP WHILE SITTING AND TALKING TO SOMEONE: WOULD NEVER DOZE
HOW LIKELY ARE YOU TO NOD OFF OR FALL ASLEEP WHILE WATCHING TV: SLIGHT CHANCE OF DOZING
HOW LIKELY ARE YOU TO NOD OFF OR FALL ASLEEP WHILE SITTING AND READING: WOULD NEVER DOZE
HOW LIKELY ARE YOU TO NOD OFF OR FALL ASLEEP WHILE SITTING QUIETLY AFTER LUNCH WITHOUT ALCOHOL: WOULD NEVER DOZE
HOW LIKELY ARE YOU TO NOD OFF OR FALL ASLEEP WHILE LYING DOWN TO REST IN THE AFTERNOON WHEN CIRCUMSTANCES PERMIT: WOULD NEVER DOZE

## 2022-04-12 NOTE — PROGRESS NOTES
William is a 81 year old who is being evaluated via a billable video visit.      How would you like to obtain your AVS? MyChart  If the video visit is dropped, the invitation should be resent by: Text to cell phone: [phone]   Will anyone else be joining your video visit? No    Medication and allergies have been reviewed.       MASTER Doe        Video-Visit Details    Type of service:  Video Visit    Video Start Time: 9:02AM    Video End Time:9:15AM    Originating Location (pt. Location): Home    Distant Location (provider location):  [facility]     Platform used for Video Visit: Stephany Hernandez PA-C

## 2022-04-12 NOTE — PATIENT INSTRUCTIONS
Your sleep apnea treatment may be affected by device recall    Our records show that you may have a [employer] Respironics CPAP for the treatment of sleep apnea. Many of these devices have been recalled* by the  for replacement. [facility] recommends:     1) If you are using a Resmed device, continue using the device.  2) If you have a [employer] Respironics device, register your device for confirmation of type of device and repair of the device at https://www.[employer] - [employer] across global offices/healthcare/e/sleep/communications/src-update -if you cannot use link, call [phone].  The website will assist you in obtaining the serial number for registration.   3) If you are using a [employer] Respironics CPAP or Bilevel PAP device and you do not have immediate breathing, driving or cardiovascular risks without the device, consider stopping use of the device after verification that is has been recalled. Discuss this decision with your medical provider if you are uncertain about your medical risks.  4) If you are not using Respironics CPAP but are using a Respironics advanced device for breathing support (AVAPS, ASV, Bilevel PAP), continue using the device and review 5 and 6 below).     5) If you continue the device, do not include ozone generating  connected to PAP devices.  6) Bacterial filters to reduce exposure to particulates are sometimes cumbersome to use and are not currently recommended by the .    ?       You may also choose discuss with your provider alternative approaches to treatment.      *[employer] is voluntarily recalling the below devices due to two (2) issues related to the polyester-based polyurethane (PE-PUR) sound abatement foam used in Angel[employer] Continuous and Non-Continuous Ventilators: 1) PE-PUR foam may degrade into particles which may enter the device's the air pathway and be ingested or inhaled by the user, and 2) the PE-PUR foam may off-gas certain chemicals.  The foam degradation may be exacerbated by use of unapproved cleaning methods, such as ozone (see FDA safety communication on use of Ozone ), and off-gassing may occur during initial operation and may possibly continue throughout the device's useful life.   These issues can result in serious injury which can be life-threatening, cause permanent impairment, and/or require medical intervention to preclude permanent impairment. To date, [employer] has received several complaints regarding the presence of black debris/particles within the airpath circuit (extending from the device outlet, humidifier, tubing, and mask). [employer] also has received reports of headache, upper airway irritation, cough, chest pressure and sinus infection. The potential risks of particulate exposure include: Irritation (skin, eye, and respiratory tract), inflammatory response, headache, asthma, adverse effects to other organs (e.g. kidneys and liver) and toxic carcinogenic affects. The potential risks of chemical exposure due to off-gassing include: headache/dizziness, irritation (eyes, nose, respiratory tract, skin), hypersensitivity, nausea/vomiting, toxic and carcinogenic effects. There have been no reports of death as a result of these issues.    Actions to be taken:  Discontinue the use of your device.  Do not continue to use ozone  with the device.     [place] affected devices on the recall website, www.[employer].com/SRC-update    i. The website provides current information on the status of the recall and how  to receive permanent corrective action to address the two issues.    ii. The website also provides instructions on how to locate an affected device  Serial Number and will guide users through the registration process.    iii. In the , call [phone] Service Hotline if you cannot visit the website

## 2022-04-12 NOTE — PROGRESS NOTES
[facility]   Outpatient Sleep Medicine  Apr 12, 2022       Name: William Lechuga MRN# [id number]   Age: 81 year old YOB: 1940            Assessment and Plan:   1. NIELS (obstructive sleep apnea)  Patient's sleep apnea is adequately managed and well treated with current PAP settings [id number] with low residual AHI of 3.0 events per hour. Compliance is excellent. Continues to tolerate PAP well and benefits from ongoing use. No indication to adjust settings today. Will continue nightly therapy. Prescription renewed for new mask/supplies.  - Comprehensive DME    Registered for replacement machine through [employer] but not yet received replacement. Confirmation was sent on 11/22/2021.      William Lechuga will follow up in about 1 year for annual visit, or sooner as needed.        Chief Complaint      Chief Complaint   Patient presents with     CPAP Follow Up          History of Present Illness:     William Lechuga is a 81 year old male who presents to the clinic for follow-up of their moderate obstructive sleep apnea treated with CPAP therapy. Other past medical history significant for chronic atrial fibrillation, HTN, HLD, Hurthle cell thyroid cancer (s/p thyroidectomy 1/2005), acquired hypothroidism.      Originally diagnosed via split night PSG on 10/29/2015 (224lbs) showing AHI was 15.9, lowest oxygen saturation was 80.3% (time spent below 89% was 25.4 minutes). RDI 18.0. Periodic Limb Movement Index 22.8/hour. PLM arousal index 1.3 per hour. Patient was titrated to 5cmH2O which was considered adequate with AHI 0 and PLM index 0. Presenting symptoms included loud snoring, gasping, witnessed apnea, some fatigue, morning dry mouth, crowded oropharynx, and comorbid HTN and A-fib.      Returns to clinic today for routine follow-up. His machine is affected by [employer] recall. Registered for replacement machine but not yet received, was sent confirmation on 11/22/2021.      Patient is using a  "Airfit F20 full face mask. The mask is comfortable. The mask is not leaking. They are not snoring with the mask on generally but \"rarely\" per wife. They are not having gasp arousals.  They are not having significant oral/nasal dryness or epistaxis.  They are not having pain/skin breakdown. The pressure settings are comfortable.     Bedtime is typically 9:00PM. No difficulty falling asleep with the mask on. Wake time is typically +/- 4:00AM but doesn't get out of bed until 5:30-6:00AM.  Patient is using PAP therapy 8.5 hours per night but reports he is sleeping 6.5-7 hours per night.     Respironics Auto-PAP 9-14 cmH2O download (3/8/22-4/6/22):  30 total days of use. 0 nonuse days.  Average use 8 hours 29 minutes per day.  100% days with >4 hours use.  Large leak 36 sec per day average. CPAP 90% pressure 12.8cm. AHI 3.0    SCALES:   INSOMNIA: Insomnia Severity Score: 5   SLEEPINESS: [place] Sleepiness Score: 1    Past medical/surgical history, family history, social history, medications and allergies were reviewed.           Physical Examination:   /70   Ht 1.829 m (6')   Wt 104.3 kg (230 lb)   BMI 31.19 kg/m    General appearance: Awake, alert, cooperative. Well groomed. Sitting comfortably in chair. In no apparent distress.  HEENT: Head: Normocephalic, atraumatic. Eyes:Conjunctiva clear. Sclera normal. Nose: External appearance without deformity.   Pulmonary:  Able to speak easily in full sentences. No cough or wheeze.   Skin:  No rashes or significant lesions on visible skin.   Neurologic: Alert, oriented x3.   Psychiatric: Mood euthymic. Affect congruent with full range and intensity.    CC:  Arelis Alexandra PA-C  Apr 12, 2022     [facility]  [address] , [place] [address]     [facility]  [address] [address]    Chart documentation was completed, in part, with [employer] voice-recognition " software. Even though reviewed, some grammatical, spelling, and word errors may remain.    22 minutes spent on day of encounter doing chart review, history and exam, documentation, and further activities as noted above

## 2022-04-19 ENCOUNTER — LAB (OUTPATIENT)
Dept: LAB | Facility: CLINIC | Age: 82
End: 2022-04-19
Payer: MEDICARE

## 2022-04-19 ENCOUNTER — ANTICOAGULATION THERAPY VISIT (OUTPATIENT)
Dept: ANTICOAGULATION | Facility: CLINIC | Age: 82
End: 2022-04-19

## 2022-04-19 DIAGNOSIS — I48.20 CHRONIC ATRIAL FIBRILLATION (H): ICD-10-CM

## 2022-04-19 DIAGNOSIS — Z79.01 CURRENT USE OF LONG TERM ANTICOAGULATION: Primary | ICD-10-CM

## 2022-04-19 LAB — INR BLD: 2.7 (ref 0.9–1.1)

## 2022-04-19 PROCEDURE — 36416 COLLJ CAPILLARY BLOOD SPEC: CPT

## 2022-04-19 PROCEDURE — 85610 PROTHROMBIN TIME: CPT

## 2022-04-19 NOTE — PROGRESS NOTES
ANTICOAGULATION MANAGEMENT     William Lechuga 81 year old male is on warfarin with therapeutic INR result. (Goal INR 2.0-3.0)    Recent labs: (last 7 days)     04/19/22  0829   INR 2.7*       ASSESSMENT       Source(s): Chart Review and Patient/Caregiver Call       Warfarin doses taken: Warfarin taken as instructed    Diet: No new diet changes identified    New illness, injury, or hospitalization: No    Medication/supplement changes: None noted    Signs or symptoms of bleeding or clotting: No    Previous INR: Therapeutic last 2(+) visits    Additional findings: None       PLAN     Recommended plan for no diet, medication or health factor changes affecting INR     Dosing Instructions: continue your current warfarin dose with next INR in 6 weeks       Summary  As of 4/19/2022    Full warfarin instructions:  2.5 mg every Mon; 5 mg all other days   Next INR check:  5/31/2022             Telephone call with William who verbalizes understanding and agrees to plan    Lab visit scheduled    Education provided: Goal range and significance of current result    Plan made per [facility] anticoagulation protocol    Lizbet Olvera RN  Anticoagulation Clinic  4/19/2022    _______________________________________________________________________     Anticoagulation Episode Summary     Current INR goal:  2.0-3.0   TTR:  100.0 % (1 y)   Target end date:  Indefinite   Send INR reminders to:  [facility]    Indications    Current use of long term anticoagulation [Z79.01]  Chronic atrial fibrillation (H) [I48.20]           Comments:           Anticoagulation Care Providers     Provider Role Specialty Phone number    Arelis Alexandra MD Referring Family Medicine [phone]    Lashay Donahue APRN CNP Responsible Family Medicine [phone]

## 2022-06-02 ENCOUNTER — ANTICOAGULATION THERAPY VISIT (OUTPATIENT)
Dept: ANTICOAGULATION | Facility: CLINIC | Age: 82
End: 2022-06-02

## 2022-06-02 ENCOUNTER — LAB (OUTPATIENT)
Dept: LAB | Facility: CLINIC | Age: 82
End: 2022-06-02
Payer: MEDICARE

## 2022-06-02 DIAGNOSIS — I48.20 CHRONIC ATRIAL FIBRILLATION (H): ICD-10-CM

## 2022-06-02 LAB — INR BLD: 2.4 (ref 0.9–1.1)

## 2022-06-02 PROCEDURE — 85610 PROTHROMBIN TIME: CPT

## 2022-06-02 PROCEDURE — 36416 COLLJ CAPILLARY BLOOD SPEC: CPT

## 2022-06-02 NOTE — PROGRESS NOTES
ANTICOAGULATION MANAGEMENT     William Lechuga 81 year old male is on warfarin with therapeutic INR result. (Goal INR 2.0-3.0)    Recent labs: (last 7 days)     06/02/22  0840   INR 2.4*       ASSESSMENT       Source(s): Chart Review and Patient/Caregiver Call       Warfarin doses taken: Warfarin taken as instructed    Diet: No new diet changes identified    New illness, injury, or hospitalization: No    Medication/supplement changes: None noted    Signs or symptoms of bleeding or clotting: No    Previous INR: Therapeutic last 2(+) visits    Additional findings: None       PLAN     Recommended plan for no diet, medication or health factor changes affecting INR     Dosing Instructions: continue your current warfarin dose with next INR in 6 weeks       Summary  As of 6/2/2022    Full warfarin instructions:  2.5 mg every Mon; 5 mg all other days   Next INR check:  7/14/2022             Telephone call with William who agrees to plan and repeated back plan correctly    Lab visit scheduled    Education provided: Importance of notifying clinic for changes in medications; a sooner lab recheck maybe needed. and Contact [phone]  with any changes, questions or concerns.     Plan made per [facility] anticoagulation protocol    Albertina Lopez RN  Anticoagulation Clinic  6/2/2022    _______________________________________________________________________     Anticoagulation Episode Summary     Current INR goal:  2.0-3.0   TTR:  100.0 % (1 y)   Target end date:  Indefinite   Send INR reminders to:  [facility]    Indications    Current use of long term anticoagulation [Z79.01]  Chronic atrial fibrillation (H) [I48.20]           Comments:           Anticoagulation Care Providers     Provider Role Specialty Phone number    Arelis Alexandra MD Referring Family Medicine [phone]    Lashay Donahue APRN CNP Responsible Family Medicine [phone]

## 2022-07-14 ENCOUNTER — ANTICOAGULATION THERAPY VISIT (OUTPATIENT)
Dept: ANTICOAGULATION | Facility: CLINIC | Age: 82
End: 2022-07-14

## 2022-07-14 ENCOUNTER — LAB (OUTPATIENT)
Dept: LAB | Facility: CLINIC | Age: 82
End: 2022-07-14
Payer: MEDICARE

## 2022-07-14 DIAGNOSIS — I48.20 CHRONIC ATRIAL FIBRILLATION (H): ICD-10-CM

## 2022-07-14 DIAGNOSIS — Z79.01 CURRENT USE OF LONG TERM ANTICOAGULATION: Primary | ICD-10-CM

## 2022-07-14 LAB — INR BLD: 2.4 (ref 0.9–1.1)

## 2022-07-14 PROCEDURE — 36416 COLLJ CAPILLARY BLOOD SPEC: CPT

## 2022-07-14 PROCEDURE — 85610 PROTHROMBIN TIME: CPT

## 2022-07-14 NOTE — PROGRESS NOTES
ANTICOAGULATION MANAGEMENT     William Lechuga 81 year old male is on warfarin with therapeutic INR result. (Goal INR 2.0-3.0)    Recent labs: (last 7 days)     07/14/22  0845   INR 2.4*       ASSESSMENT       Source(s): Chart Review and Patient/Caregiver Call       Warfarin doses taken: Warfarin taken as instructed    Diet: No new diet changes identified    New illness, injury, or hospitalization: No    Medication/supplement changes: None noted    Signs or symptoms of bleeding or clotting: No    Previous INR: Therapeutic last 2(+) visits    Additional findings: None       PLAN     Recommended plan for no diet, medication or health factor changes affecting INR     Dosing Instructions: continue your current warfarin dose with next INR in 6 weeks       Summary  As of 7/14/2022    Full warfarin instructions:  2.5 mg every Mon; 5 mg all other days   Next INR check:  8/25/2022             Telephone call with William who verbalizes understanding and agrees to plan    Lab visit scheduled    Education provided: Goal range and significance of current result    Plan made per [facility] anticoagulation protocol    Lizbet Olvera RN  Anticoagulation Clinic  7/14/2022    _______________________________________________________________________     Anticoagulation Episode Summary     Current INR goal:  2.0-3.0   TTR:  100.0 % (1 y)   Target end date:  Indefinite   Send INR reminders to:  [facility]    Indications    Current use of long term anticoagulation [Z79.01]  Chronic atrial fibrillation (H) [I48.20]           Comments:           Anticoagulation Care Providers     Provider Role Specialty Phone number    Arelis Alexandra MD Referring Family Medicine [phone]    Lashay Donahue APRN CNP Responsible Family Medicine [phone]

## 2022-08-09 ENCOUNTER — OFFICE VISIT (OUTPATIENT)
Dept: FAMILY MEDICINE | Facility: CLINIC | Age: 82
End: 2022-08-09
Payer: MEDICARE

## 2022-08-09 VITALS
WEIGHT: 228.8 LBS | BODY MASS INDEX: 31.03 KG/M2 | RESPIRATION RATE: 16 BRPM | TEMPERATURE: 97.5 F | DIASTOLIC BLOOD PRESSURE: 82 MMHG | OXYGEN SATURATION: 98 % | SYSTOLIC BLOOD PRESSURE: 146 MMHG | HEART RATE: 63 BPM

## 2022-08-09 DIAGNOSIS — R06.00 DYSPNEA, UNSPECIFIED TYPE: ICD-10-CM

## 2022-08-09 DIAGNOSIS — R60.0 BILATERAL LOWER EXTREMITY EDEMA: Primary | ICD-10-CM

## 2022-08-09 DIAGNOSIS — C73 MALIGNANT NEOPLASM OF THYROID GLAND (H): ICD-10-CM

## 2022-08-09 DIAGNOSIS — I87.2 STASIS DERMATITIS OF BOTH LEGS: ICD-10-CM

## 2022-08-09 DIAGNOSIS — I10 HYPERTENSION GOAL BP (BLOOD PRESSURE) < 140/90: ICD-10-CM

## 2022-08-09 LAB
ALBUMIN SERPL-MCNC: 4 G/DL (ref 3.4–5)
ALP SERPL-CCNC: 55 U/L (ref 40–150)
ALT SERPL W P-5'-P-CCNC: 28 U/L (ref 0–70)
ANION GAP SERPL CALCULATED.3IONS-SCNC: 5 MMOL/L (ref 3–14)
AST SERPL W P-5'-P-CCNC: 25 U/L (ref 0–45)
BILIRUB SERPL-MCNC: 0.9 MG/DL (ref 0.2–1.3)
BUN SERPL-MCNC: 16 MG/DL (ref 7–30)
CALCIUM SERPL-MCNC: 8.8 MG/DL (ref 8.5–10.1)
CHLORIDE BLD-SCNC: 108 MMOL/L (ref 94–109)
CO2 SERPL-SCNC: 28 MMOL/L (ref 20–32)
CREAT SERPL-MCNC: 0.99 MG/DL (ref 0.66–1.25)
GFR SERPL CREATININE-BSD FRML MDRD: 77 ML/MIN/1.73M2
GLUCOSE BLD-MCNC: 95 MG/DL (ref 70–99)
NT-PROBNP SERPL-MCNC: 1060 PG/ML (ref 0–1800)
POTASSIUM BLD-SCNC: 4.5 MMOL/L (ref 3.4–5.3)
PROT SERPL-MCNC: 7.3 G/DL (ref 6.8–8.8)
SODIUM SERPL-SCNC: 141 MMOL/L (ref 133–144)
TSH SERPL DL<=0.005 MIU/L-ACNC: 1.48 MU/L (ref 0.4–4)

## 2022-08-09 PROCEDURE — 83880 ASSAY OF NATRIURETIC PEPTIDE: CPT | Performed by: FAMILY MEDICINE

## 2022-08-09 PROCEDURE — 99214 OFFICE O/P EST MOD 30 MIN: CPT | Performed by: FAMILY MEDICINE

## 2022-08-09 PROCEDURE — 36415 COLL VENOUS BLD VENIPUNCTURE: CPT | Performed by: FAMILY MEDICINE

## 2022-08-09 PROCEDURE — 84443 ASSAY THYROID STIM HORMONE: CPT | Performed by: FAMILY MEDICINE

## 2022-08-09 PROCEDURE — 80053 COMPREHEN METABOLIC PANEL: CPT | Performed by: FAMILY MEDICINE

## 2022-08-09 RX ORDER — FUROSEMIDE 20 MG
20 TABLET ORAL DAILY
Qty: 30 TABLET | Refills: 1 | Status: SHIPPED | OUTPATIENT
Start: 2022-08-09 | End: 2022-09-07

## 2022-08-09 ASSESSMENT — PAIN SCALES - GENERAL: PAINLEVEL: NO PAIN (0)

## 2022-08-09 NOTE — PROGRESS NOTES
Assessment & Plan     Bilateral lower extremity edema- multiple differentials to include venous insufficiency, heart failure, medication side effect (Amlodipine)  - BNP-N terminal pro  - Comprehensive metabolic panel (BMP + Alb, Alk Phos, ALT, AST, Total. 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       BMI:   Estimated body mass index is 31.03 kg/m  as calculated from the following:    Height as of 4/12/22: 1.829 m (6').    Weight as of this encounter: 103.8 kg (228 lb 12.8 oz).   Weight management plan: Discussed healthy diet and exercise guidelines    Will notify patient with results.       Return in about 2 weeks (around 8/23/2022), or if symptoms worsen or fail to improve.    Arelis Alexandra MD  [facility] JEREMI Thomas is a 81 year old, presenting for the following health issues:  Edema      History of Present Illness       Reason for visit:  Swolen ankles and lower legs  Symptom onset:  More than a month  Symptoms include:  Observed swelling  Symptom intensity:  Moderate  Symptom progression:  Staying the same  Had these symptoms before:  No  What makes it worse:  No  What makes it better:  No    He eats 2-3 servings of fruits and vegetables daily.He consumes 0 sweetened beverage(s) daily.He exercises with enough effort to increase his heart rate 30 to 60 minutes per day.   He exercises with enough effort to increase his heart rate 5 days per week.   He is taking medications regularly.     Currently on Amlodipine for hypertension management.  Reports some dyspnea on exertion but no orthopnea or paroxysmal nocturnal dyspnea.   No prior known history of heart failure.     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 States that he still enrolled in the [facility] Program through the [facility].     Review of Systems   Constitutional, HEENT, cardiovascular, pulmonary, gi and gu systems are negative, except as otherwise noted.      Objective    BP (!) 146/82   Pulse 63   Temp 97.5  F (36.4  C) (Tympanic)   Resp 16   Wt 103.8 kg (228 lb 12.8 oz)   SpO2 98%   BMI 31.03 kg/m    Body mass index is 31.03 kg/m .  Physical Exam   GENERAL: healthy, alert and no distress  RESP: lungs clear to auscultation - no rales, rhonchi or wheezes  CV: regular rate and rhythm, normal S1 S2, no S3 or S4, no murmur, click or rub, no peripheral edema and peripheral pulses strong  LOWER EXTREMITIES: 2-3 + pitting ankle edema with discoloration of the lower extremities with stasis dermatitis changes. No venous ulceration or skin breakdown noted.     DATA  Labs drawn and in process.             .  ..

## 2022-08-21 PROBLEM — K64.9 HEMORRHOIDS: Status: ACTIVE | Noted: 2022-08-21

## 2022-08-21 PROBLEM — H26.9 ACQUIRED CATARACT: Status: ACTIVE | Noted: 2022-08-21

## 2022-08-25 ENCOUNTER — LAB (OUTPATIENT)
Dept: LAB | Facility: CLINIC | Age: 82
End: 2022-08-25
Payer: MEDICARE

## 2022-08-25 ENCOUNTER — ANTICOAGULATION THERAPY VISIT (OUTPATIENT)
Dept: ANTICOAGULATION | Facility: CLINIC | Age: 82
End: 2022-08-25

## 2022-08-25 DIAGNOSIS — I48.20 CHRONIC ATRIAL FIBRILLATION (H): ICD-10-CM

## 2022-08-25 DIAGNOSIS — Z79.01 CURRENT USE OF LONG TERM ANTICOAGULATION: Primary | ICD-10-CM

## 2022-08-25 LAB — INR BLD: 2.6 (ref 0.9–1.1)

## 2022-08-25 PROCEDURE — 36416 COLLJ CAPILLARY BLOOD SPEC: CPT

## 2022-08-25 PROCEDURE — 85610 PROTHROMBIN TIME: CPT

## 2022-08-25 NOTE — PROGRESS NOTES
ANTICOAGULATION MANAGEMENT     William Lechuga 81 year old male is on warfarin with therapeutic INR result. (Goal INR 2.0-3.0)    Recent labs: (last 7 days)     08/25/22  0844   INR 2.6*       ASSESSMENT       Source(s): Chart Review and Patient/Caregiver Call       Warfarin doses taken: Warfarin taken as instructed    Diet: No new diet changes identified    New illness, injury, or hospitalization: No    Medication/supplement changes: None noted    Signs or symptoms of bleeding or clotting: No    Previous INR: Therapeutic last 2(+) visits    Additional findings: None       PLAN     Recommended plan for no diet, medication or health factor changes affecting INR     Dosing Instructions: Continue your current warfarin dose with next INR in 6 weeks       Summary  As of 8/25/2022    Full warfarin instructions:  2.5 mg every Mon; 5 mg all other days   Next INR check:  10/6/2022             Telephone call with William who verbalizes understanding and agrees to plan and who agrees to plan and repeated back plan correctly    Lab visit scheduled    Education provided: None required    Plan made per [facility] anticoagulation protocol    Shala Stiles, RN  Anticoagulation Clinic  8/25/2022    _______________________________________________________________________     Anticoagulation Episode Summary     Current INR goal:  2.0-3.0   TTR:  100.0 % (1 y)   Target end date:  Indefinite   Send INR reminders to:  [facility]    Indications    Current use of long term anticoagulation [Z79.01]  Chronic atrial fibrillation (H) [I48.20]           Comments:           Anticoagulation Care Providers     Provider Role Specialty Phone number    Aerlis Alexandra MD Referring Family Medicine [phone]    Lashay Donahue APRN CNP Responsible Family Medicine [phone]

## 2022-08-26 ENCOUNTER — ANCILLARY PROCEDURE (OUTPATIENT)
Dept: ULTRASOUND IMAGING | Facility: CLINIC | Age: 82
End: 2022-08-26
Attending: FAMILY MEDICINE
Payer: MEDICARE

## 2022-08-26 DIAGNOSIS — R60.0 BILATERAL LOWER EXTREMITY EDEMA: ICD-10-CM

## 2022-08-26 DIAGNOSIS — I87.2 STASIS DERMATITIS OF BOTH LEGS: ICD-10-CM

## 2022-08-26 PROCEDURE — 93970 EXTREMITY STUDY: CPT | Performed by: RADIOLOGY

## 2022-09-07 ENCOUNTER — OFFICE VISIT (OUTPATIENT)
Dept: FAMILY MEDICINE | Facility: CLINIC | Age: 82
End: 2022-09-07
Payer: MEDICARE

## 2022-09-07 VITALS
TEMPERATURE: 97.6 F | SYSTOLIC BLOOD PRESSURE: 148 MMHG | HEART RATE: 57 BPM | RESPIRATION RATE: 16 BRPM | OXYGEN SATURATION: 99 % | WEIGHT: 227 LBS | BODY MASS INDEX: 30.79 KG/M2 | DIASTOLIC BLOOD PRESSURE: 76 MMHG

## 2022-09-07 DIAGNOSIS — I87.2 VENOUS INSUFFICIENCY OF BOTH LOWER EXTREMITIES: Primary | ICD-10-CM

## 2022-09-07 DIAGNOSIS — I10 HYPERTENSION GOAL BP (BLOOD PRESSURE) < 140/90: ICD-10-CM

## 2022-09-07 DIAGNOSIS — R60.0 BILATERAL LOWER EXTREMITY EDEMA: ICD-10-CM

## 2022-09-07 LAB
ANION GAP SERPL CALCULATED.3IONS-SCNC: 7 MMOL/L (ref 3–14)
CHLORIDE BLD-SCNC: 105 MMOL/L (ref 94–109)
CO2 SERPL-SCNC: 26 MMOL/L (ref 20–32)
POTASSIUM BLD-SCNC: 5.3 MMOL/L (ref 3.4–5.3)
SODIUM SERPL-SCNC: 138 MMOL/L (ref 133–144)

## 2022-09-07 PROCEDURE — 99214 OFFICE O/P EST MOD 30 MIN: CPT | Performed by: FAMILY MEDICINE

## 2022-09-07 PROCEDURE — 80051 ELECTROLYTE PANEL: CPT | Performed by: FAMILY MEDICINE

## 2022-09-07 PROCEDURE — 36415 COLL VENOUS BLD VENIPUNCTURE: CPT | Performed by: FAMILY MEDICINE

## 2022-09-07 RX ORDER — FUROSEMIDE 20 MG
20 TABLET ORAL DAILY
Qty: 90 TABLET | Refills: 3 | Status: SHIPPED | OUTPATIENT
Start: 2022-09-07 | End: 2023-06-19

## 2022-09-07 NOTE — PROGRESS NOTES
Assessment & Plan     Venous insufficiency of both lower extremities  -  Recent venous Competency Ultrasound reviewed with patient. No evidence of DVT but incompetent veins noted. See imaging report for details.   - Patient education and Handout with home care instructions given. See AVS for details.  - Recommended compression stockings, remain active and good weight management. Patient states that he has stocking available at home.     Bilateral lower extremity edema, improving  - Refill: furosemide (LASIX) 20 MG tablet  Dispense: 90 tablet; Refill: 3    Hypertension goal BP (blood pressure) < 140/90, better control at home.  - Continue current management.  - Electrolyte panel (Na, K, Cl, CO2, Anion gap)  - Refill: furosemide (LASIX) 20 MG tablet  Dispense: 90 tablet; Refill: 3        Return in about 6 months (around 3/7/2023) for Medication check.    Arelis Alexandra MD  [facility] JEREMI Thomas is a 81 year old, presenting for the following health issues:  Edema      History of Present Illness       Reason for visit:  Follow up on ankle/calf swelling  Symptom progression:  Improving    He eats 2-3 servings of fruits and vegetables daily.He consumes 0 sweetened beverage(s) daily.He exercises with enough effort to increase his heart rate 30 to 60 minutes per day.  He exercises with enough effort to increase his heart rate 5 days per week.   He is taking medications regularly.     Patient seen on 8/9/22 with lateral lower extremity edema-work-up done to include medication change (discontinued amlodipine) , lab work-up and imaging.  Amlodipine was discontinued and he was started on furosemide 20 mg daily-reports significant improvement in his lower extremity edema.  Labs were unremarkable.  BNP was normal for age.   Venous competency ultrasound revealed incompetent veins bilaterally.    Home blood pressures remain within goal with systolic blood pressures ranging from 114-145 systolic  blood pressures in the 60s - 80s.  No hypotensive episodes noted    Patient reports feeling well overall.  No additional concerns today.    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  Reports that he is scheduled to have it done in December.      Review of Systems   Constitutional, HEENT, cardiovascular, pulmonary, gi and gu systems are negative, except as otherwise noted.      Objective    BP (!) 148/76   Pulse 57   Temp 97.6  F (36.4  C) (Tympanic)   Resp 16   Wt 103 kg (227 lb)   SpO2 99%   BMI 30.79 kg/m    Body mass index is 30.79 kg/m .  Physical Exam   GENERAL: healthy, alert and no distress  LOWER EXTREMITIES: Brownish discoloration with stasis dermatitis changes worse on the right than the left leg but no venous ulceration also from breakdown noted.  No pitting edema noted on exam today.    DATA  Recent labs reviewed with patient.   Component      Latest Ref Rng & Units 8/9/2022   Sodium      133 - 144 mmol/L 141   Potassium      3.4 - 5.3 mmol/L 4.5   Chloride      94 - 109 mmol/L 108   Carbon Dioxide      20 - 32 mmol/L 28   Anion Gap      3 - 14 mmol/L 5   Urea Nitrogen      7 - 30 mg/dL 16   Creatinine      0.66 - 1.25 mg/dL 0.99   Calcium      8.5 - 10.1 mg/dL 8.8   Glucose      70 - 99 mg/dL 95   Alkaline Phosphatase      40 - 150 U/L 55   AST      0 - 45 U/L 25   ALT      0 - 70 U/L 28   Protein Total      6.8 - 8.8 g/dL 7.3   Albumin      3.4 - 5.0 g/dL 4.0   Bilirubin Total      0.2 - 1.3 mg/dL 0.9   GFR Estimate      >60 mL/min/1.73m2 77   N-Terminal Pro Bnp      0 - 1,800 pg/mL 1,060   TSH      0.40 - 4.00 mU/L 1.48         ULTRASOUND VENOUS COMPETENCY BILATERAL 8/26/2022 9:06 AM  IMPRESSION:     1. RIGHT LEG:       A. No superficial or deep venous thrombosis demonstrated.       B. Popliteal vein incompetent.       C. Great saphenous vein incompetent from the knee through the mid  calf.       D. No incompetent  veins demonstrated. Competent   vein measured 12 cm  from the medial malleolus.       E. Incompetent varicose vein as described in the reports.     2. LEFT LEG:       A. No superficial or deep venous thrombosis demonstrated.       B. Common femoral vein incompetent below the saphenofemoral  junction with Valsalva.       C. Great saphenous vein incompetent in the proximal thigh, knee,  mid calf, and ankle.       D. Incompetent varicose and  veins as described in the  report.

## 2022-09-22 ENCOUNTER — MYC MEDICAL ADVICE (OUTPATIENT)
Dept: SLEEP MEDICINE | Facility: CLINIC | Age: 82
End: 2022-09-22

## 2022-10-06 ENCOUNTER — LAB (OUTPATIENT)
Dept: LAB | Facility: CLINIC | Age: 82
End: 2022-10-06
Payer: MEDICARE

## 2022-10-06 ENCOUNTER — ANTICOAGULATION THERAPY VISIT (OUTPATIENT)
Dept: ANTICOAGULATION | Facility: CLINIC | Age: 82
End: 2022-10-06

## 2022-10-06 DIAGNOSIS — Z79.01 CURRENT USE OF LONG TERM ANTICOAGULATION: Primary | ICD-10-CM

## 2022-10-06 DIAGNOSIS — I48.20 CHRONIC ATRIAL FIBRILLATION (H): ICD-10-CM

## 2022-10-06 LAB — INR BLD: 1.9 (ref 0.9–1.1)

## 2022-10-06 PROCEDURE — 36416 COLLJ CAPILLARY BLOOD SPEC: CPT

## 2022-10-06 PROCEDURE — 85610 PROTHROMBIN TIME: CPT

## 2022-10-06 NOTE — PROGRESS NOTES
ANTICOAGULATION MANAGEMENT     William Lechuga 82 year old male is on warfarin with subtherapeutic INR result. (Goal INR 2.0-3.0)    Recent labs: (last 7 days)     10/06/22  0847   INR 1.9*       ASSESSMENT       Source(s): Chart Review and Patient/Caregiver Call       Warfarin doses taken: Warfarin taken as instructed    Diet: No new diet changes identified    New illness, injury, or hospitalization: No    Medication/supplement changes: None noted    Signs or symptoms of bleeding or clotting: No    Previous INR: Therapeutic last 2(+) visits    Additional findings: None       PLAN     Recommended plan for no diet, medication or health factor changes affecting INR     Dosing Instructions: Continue your current warfarin dose with next INR in 2 weeks       Summary  As of 10/6/2022    Full warfarin instructions:  2.5 mg every Mon; 5 mg all other days   Next INR check:  10/20/2022             Telephone call with William who verbalizes understanding and agrees to plan but prefers recheck INR in 3 weeks- same day as an eye appointment that he has    Lab visit scheduled    Education provided: Goal range and significance of current result    Plan made per [facility] anticoagulation protocol    Janessa Garcia RN  Anticoagulation Clinic  10/6/2022    _______________________________________________________________________     Anticoagulation Episode Summary     Current INR goal:  2.0-3.0   TTR:  98.4 % (1 y)   Target end date:  Indefinite   Send INR reminders to:  [facility]    Indications    Current use of long term anticoagulation [Z79.01]  Chronic atrial fibrillation (H) [I48.20]           Comments:           Anticoagulation Care Providers     Provider Role Specialty Phone number    Arelis Alexandra MD Referring Family Medicine [phone]    Lashay Donahue APRN CNP Responsible Family Medicine [phone]

## 2022-10-27 ENCOUNTER — LAB (OUTPATIENT)
Dept: LAB | Facility: CLINIC | Age: 82
End: 2022-10-27
Payer: MEDICARE

## 2022-10-27 ENCOUNTER — ANTICOAGULATION THERAPY VISIT (OUTPATIENT)
Dept: ANTICOAGULATION | Facility: CLINIC | Age: 82
End: 2022-10-27

## 2022-10-27 DIAGNOSIS — I48.20 CHRONIC ATRIAL FIBRILLATION (H): Chronic | ICD-10-CM

## 2022-10-27 DIAGNOSIS — Z79.01 CURRENT USE OF LONG TERM ANTICOAGULATION: Primary | ICD-10-CM

## 2022-10-27 DIAGNOSIS — I48.20 CHRONIC ATRIAL FIBRILLATION (H): ICD-10-CM

## 2022-10-27 LAB — INR BLD: 3 (ref 0.9–1.1)

## 2022-10-27 PROCEDURE — 36416 COLLJ CAPILLARY BLOOD SPEC: CPT

## 2022-10-27 PROCEDURE — 85610 PROTHROMBIN TIME: CPT

## 2022-10-27 NOTE — PROGRESS NOTES
ANTICOAGULATION MANAGEMENT     William Lechuga 82 year old male is on warfarin with therapeutic INR result. (Goal INR 2.0-3.0)    Recent labs: (last 7 days)     10/27/22  1107   INR 3.0*       ASSESSMENT       Source(s): Chart Review and Patient/Caregiver Call       Warfarin doses taken: Warfarin taken as instructed    Diet: No new diet changes identified    New illness, injury, or hospitalization: No    Medication/supplement changes: None noted    Signs or symptoms of bleeding or clotting: No    Previous INR: Subtherapeutic    Additional findings: patient requesting to come back in 6 weeks, prior to leaving for [place]        PLAN     Recommended plan for no diet, medication or health factor changes affecting INR     Dosing Instructions: Continue your current warfarin dose with next INR in 6 weeks       Summary  As of 10/27/2022    Full warfarin instructions:  2.5 mg every Mon; 5 mg all other days; Starting 10/27/2022   Next INR check:  12/8/2022             Telephone call with William who verbalizes understanding and agrees to plan    Lab visit scheduled    Education provided:     Contact [phone]  with any changes, questions or concerns.     Plan made per [facility] anticoagulation protocol    Sultana Hedrick RN  Anticoagulation Clinic  10/27/2022    _______________________________________________________________________     Anticoagulation Episode Summary     Current INR goal:  2.0-3.0   TTR:  97.8 % (1 y)   Target end date:  Indefinite   Send INR reminders to:  [facility] BLO[facility]    Indications    Current use of long term anticoagulation [Z79.01]  Chronic atrial fibrillation (H) [I48.20]           Comments:           Anticoagulation Care Providers     Provider Role Specialty Phone number    Arelis Alexandra MD Referring Family Medicine [phone]    Lashay Donahue APRN CNP Responsible Family Medicine [phone]

## 2022-11-17 ENCOUNTER — MYC MEDICAL ADVICE (OUTPATIENT)
Dept: FAMILY MEDICINE | Facility: CLINIC | Age: 82
End: 2022-11-17

## 2022-11-17 NOTE — TELEPHONE ENCOUNTER
From: Viktoria Acevedo  To: Yoko Angela MD  Sent: 6/13/2019 11:21 AM CDT  Subject: Other    Good morning Doctor   Radha Degroot news Yesterday morning sugar 196  And this morning sugar is 120. Thank you very everything. Have a blessed weekend. Routing Mychart to PCP.     Advise medication adjustment or office visit?     Last seen 9/07/22, recommended to follow up in 6 mo.     BP Readings from Last 3 Encounters:   09/07/22 (!) 148/76   08/09/22 (!) 146/82   04/12/22 135/70     Arely Lewis RN, BSN, PHN  [facility] [place]

## 2022-12-07 DIAGNOSIS — E78.5 HYPERLIPIDEMIA LDL GOAL <130: Primary | ICD-10-CM

## 2022-12-07 DIAGNOSIS — J34.89 RHINORRHEA: ICD-10-CM

## 2022-12-07 RX ORDER — IPRATROPIUM BROMIDE 42 UG/1
1 SPRAY, METERED NASAL 3 TIMES DAILY
Qty: 15 ML | Refills: 3 | Status: SHIPPED | OUTPATIENT
Start: 2022-12-07 | End: 2023-08-21

## 2022-12-07 RX ORDER — ROSUVASTATIN CALCIUM 10 MG/1
10 TABLET, COATED ORAL DAILY
Qty: 90 TABLET | Refills: 3 | Status: SHIPPED | OUTPATIENT
Start: 2022-12-07 | End: 2023-06-19

## 2022-12-12 ENCOUNTER — LAB (OUTPATIENT)
Dept: LAB | Facility: CLINIC | Age: 82
End: 2022-12-12
Payer: MEDICARE

## 2022-12-12 ENCOUNTER — ANTICOAGULATION THERAPY VISIT (OUTPATIENT)
Dept: ANTICOAGULATION | Facility: CLINIC | Age: 82
End: 2022-12-12

## 2022-12-12 DIAGNOSIS — I48.20 CHRONIC ATRIAL FIBRILLATION (H): ICD-10-CM

## 2022-12-12 DIAGNOSIS — Z79.01 CURRENT USE OF LONG TERM ANTICOAGULATION: Primary | ICD-10-CM

## 2022-12-12 DIAGNOSIS — I48.20 CHRONIC ATRIAL FIBRILLATION (H): Chronic | ICD-10-CM

## 2022-12-12 LAB — INR BLD: 2.6 (ref 0.9–1.1)

## 2022-12-12 PROCEDURE — 85610 PROTHROMBIN TIME: CPT

## 2022-12-12 PROCEDURE — 36416 COLLJ CAPILLARY BLOOD SPEC: CPT

## 2022-12-12 NOTE — PROGRESS NOTES
ANTICOAGULATION MANAGEMENT     William Lechuga 82 year old male is on warfarin with therapeutic INR result. (Goal INR 2.0-3.0)    Recent labs: (last 7 days)     12/12/22  1534   INR 2.6*       ASSESSMENT       Source(s): Chart Review and Patient/Caregiver Call       Warfarin doses taken: Warfarin taken as instructed    Diet: No new diet changes identified    New illness, injury, or hospitalization: No    Medication/supplement changes: None noted    Signs or symptoms of bleeding or clotting: No    Previous INR: Therapeutic last 2(+) visits    Additional findings: leaving for [place] for the winter on 12/23, he will return in April.  Future orders mailed to patients home address.        PLAN     Recommended plan for no diet, medication or health factor changes affecting INR     Dosing Instructions: Continue your current warfarin dose with next INR in 6 weeks       Summary  As of 12/12/2022    Full warfarin instructions:  2.5 mg every Mon; 5 mg all other days; Starting 12/12/2022   Next INR check:  1/23/2023             Telephone call with William who verbalizes understanding and agrees to plan    Patient using outside facility for labs    Education provided:     Contact [phone]  with any changes, questions or concerns.     Plan made per [facility] anticoagulation protocol    Sultana Hedrick, RN  Anticoagulation Clinic  12/12/2022    _______________________________________________________________________     Anticoagulation Episode Summary     Current INR goal:  2.0-3.0   TTR:  97.8 % (1 y)   Target end date:  Indefinite   Send INR reminders to:  [facility]    Indications    Current use of long term anticoagulation [Z79.01]  Chronic atrial fibrillation (H) [I48.20]           Comments:           Anticoagulation Care Providers     Provider Role Specialty Phone number    Arelis Alexandra MD Referring Family Medicine [phone]    Lashay Donahue APRN [facility] Family Medicine [phone]         ANTICOAGULATION  MANAGEMENT- Travel planning    William Lechuga reports upcoming travel plans to [place].    Departure date: 12/23/22  Anticipated return date: 4/17/22  Alternate contact information (if applicable): none      INR monitoring plan:     [facility] to monitor and dose while traveling. INR standing order to be mailed to: pt home. . Faxed results to be routed to [facility]     Anticoagulation calendar updated with date of next INR     Instructed to call the Anticoauglation Clinic for any changes, questions or concerns. [phone]   ?   Sultana Hedrick RN

## 2023-01-25 ENCOUNTER — TRANSFERRED RECORDS (OUTPATIENT)
Dept: HEALTH INFORMATION MANAGEMENT | Facility: CLINIC | Age: 83
End: 2023-01-25
Payer: MEDICARE

## 2023-01-26 ENCOUNTER — ANTICOAGULATION THERAPY VISIT (OUTPATIENT)
Dept: ANTICOAGULATION | Facility: CLINIC | Age: 83
End: 2023-01-26
Payer: MEDICARE

## 2023-01-26 DIAGNOSIS — I48.20 CHRONIC ATRIAL FIBRILLATION (H): Chronic | ICD-10-CM

## 2023-01-26 DIAGNOSIS — Z79.01 CURRENT USE OF LONG TERM ANTICOAGULATION: Primary | ICD-10-CM

## 2023-01-26 LAB — INR (EXTERNAL): 2.1 (ref 0.9–1.1)

## 2023-01-26 NOTE — PROGRESS NOTES
ANTICOAGULATION MANAGEMENT     William Lechuga 82 year old male is on warfarin with therapeutic INR result. (Goal INR 2.0-3.0)    Recent labs: (last 7 days)     01/25/23  1111   INR 2.1*       ASSESSMENT       Source(s): Chart Review and Patient/Caregiver Call       Warfarin doses taken: Warfarin taken as instructed    Diet: No new diet changes identified    New illness, injury, or hospitalization: No    Medication/supplement changes: None noted    Signs or symptoms of bleeding or clotting: No    Previous INR: Therapeutic last 2(+) visits    Additional findings: None       PLAN     Recommended plan for no diet, medication or health factor changes affecting INR     Dosing Instructions: Continue your current warfarin dose with next INR in 6 weeks       Summary  As of 1/26/2023    Full warfarin instructions:  2.5 mg every Mon; 5 mg all other days   Next INR check:  3/9/2023             Telephone call with William who verbalizes understanding and agrees to plan    Patient using outside facility for labs    Education provided:     Please call back if any changes to your diet, medications or how you've been taking warfarin    Contact [phone]  with any changes, questions or concerns.     Plan made per [facility] anticoagulation protocol    Amanda Lockett RN  Anticoagulation Clinic  1/26/2023    _______________________________________________________________________     Anticoagulation Episode Summary     Current INR goal:  2.0-3.0   TTR:  97.8 % (1 y)   Target end date:  Indefinite   Send INR reminders to:  [facility]    Indications    Current use of long term anticoagulation [Z79.01]  Chronic atrial fibrillation (H) [I48.20]           Comments:           Anticoagulation Care Providers     Provider Role Specialty Phone number    Arelis Alexandra MD Referring Family Medicine [phone]    Lashay Donahue APRN CNP Responsible Family Medicine [phone]

## 2023-02-28 ENCOUNTER — MYC MEDICAL ADVICE (OUTPATIENT)
Dept: FAMILY MEDICINE | Facility: CLINIC | Age: 83
End: 2023-02-28
Payer: MEDICARE

## 2023-02-28 NOTE — TELEPHONE ENCOUNTER
Noted.   I can take over med refills in the interim- ok to schedule a Med check visit- F2F/Virtual is ok.

## 2023-02-28 NOTE — TELEPHONE ENCOUNTER
Enderhart response sent to patient.    Marycarmen Clement RN  [facility]

## 2023-02-28 NOTE — TELEPHONE ENCOUNTER
Routed to PCP to advise if ok taking over the below scripts.      Would you like patient to make an appointment, pr continue to get meds from other provider, per pari.      Cassie WAKEFIELD BSN  Triage Nurse  [facility]: [facility]  Ph: [phone]

## 2023-03-02 ENCOUNTER — TRANSFERRED RECORDS (OUTPATIENT)
Dept: HEALTH INFORMATION MANAGEMENT | Facility: CLINIC | Age: 83
End: 2023-03-02
Payer: MEDICARE

## 2023-03-02 LAB — INR (EXTERNAL): 2 (ref 0.9–1.1)

## 2023-03-03 ENCOUNTER — ANTICOAGULATION THERAPY VISIT (OUTPATIENT)
Dept: ANTICOAGULATION | Facility: CLINIC | Age: 83
End: 2023-03-03
Payer: MEDICARE

## 2023-03-03 ENCOUNTER — TELEPHONE (OUTPATIENT)
Dept: ANTICOAGULATION | Facility: CLINIC | Age: 83
End: 2023-03-03
Payer: MEDICARE

## 2023-03-03 DIAGNOSIS — Z79.01 CURRENT USE OF LONG TERM ANTICOAGULATION: Primary | ICD-10-CM

## 2023-03-03 DIAGNOSIS — I48.20 CHRONIC ATRIAL FIBRILLATION (H): Chronic | ICD-10-CM

## 2023-03-03 NOTE — PROGRESS NOTES
William is a 82 year old who is being evaluated via a billable video visit.      How would you like to obtain your AVS? MyChart  If the video visit is dropped, the invitation should be resent by: Text to cell phone: [phone]  Will anyone else be joining your video visit? 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    Aerlis Alexandra MD  [facility] JEREMI Thomas is a 82 year old, presenting for the following health issues:  Hypertension, Thyroid Problem, and Atrial Fib      History of Present Illness       Reason for visit:   requested medications review    He eats 4 or more servings of fruits and vegetables daily.He consumes 0 sweetened beverage(s) daily.He exercises with enough effort to increase his heart rate 30 to 60 minutes per day.  He exercises with enough effort to increase his heart rate 5 days per week.   He is taking medications regularly.     Patient reports that he does Annual Executive Physicals at the [facility] but unfortunately these are booked out for 23 months.   He is currently running out of his current medications and would like a med  review and refills today.  Returning from [place] at the end of the month.      Hypertension Follow-up  Currently controlled on carvedilol 6.25 mg twice daily, lisinopril 40 mg daily and Lasix 20 mg daily-tolerating well, no side effects reported.  Reports that his BPs have been well within goal.     Do you check your blood pressure regularly outside of the clinic? Yes     Are you following a low salt diet? Yes    Are your blood pressures ever more than 140 on the top number (systolic) OR more   than 90 on the bottom number (diastolic), for example 140/90? Yes    Hypothyroidism Follow-up  Currently on levothyroxine 137 mcg/day.   Last TSH-   Component      Latest Ref Rng & Units 8/9/2022   TSH      0.40 - 4.00 mU/L 1.48       Since last visit, patient describes the following symptoms: Weight stable, no hair loss, no skin changes, no constipation, no loose stools    Has a known history of chronic atrial fibrillation-rate controlled.  Currently on long-term anticoagulation therapy with Coumadin.  Recent INR noted to be therapeutic at 2.0.  Component      Latest Ref Rng & Units 3/2/2023   INR HOME MONITORING      0.9 - 1.1 2.0 (A)       Review of Systems   Constitutional, HEENT, cardiovascular, pulmonary, gi and gu systems are negative, except as otherwise noted.      Objective           Vitals:  No vitals were obtained today due to virtual visit.    Physical Exam   GENERAL: Healthy, alert and no distress  EYES: Eyes grossly normal to inspection.  No discharge or erythema, or obvious scleral/conjunctival abnormalities.  RESP: No audible wheeze, cough, or visible cyanosis.  No visible retractions or increased work of breathing.    SKIN: Visible skin clear. No significant rash, abnormal pigmentation or lesions.  NEURO: Cranial nerves grossly intact.  Mentation and speech appropriate for age.  PSYCH: Mentation appears normal, affect normal/bright, judgement and insight intact, normal speech and appearance  well-groomed.    DATA  Recent labs reviewed.             Video-Visit Details    Type of service:  Video Visit   Video Start Time: 1:15 pm   Video End Time:1:35 pm    Originating Location (pt. Location): Home  Distant Location (provider location):  On-site  Platform used for Video Visit: Stephany

## 2023-03-03 NOTE — TELEPHONE ENCOUNTER
ANTICOAGULATION CLINIC REFERRAL RENEWAL REQUEST       An annual renewal order is required for all patients referred to [facility].?  Please review and sign the pended referral order for William Lechuga.       ANTICOAGULATION SUMMARY      Warfarin indication(s)   Atrial Fibrillation    Mechanical heart valve present?  NO       Current goal range   INR: 2.0-3.0     Goal appropriate for indication? Goal INR 2-3, standard for indication(s) above     Time in Therapeutic Range (TTR)  (Goal > 60%) 97.8%       Office visit with referring provider's group within last year yes on 9/7/22       Rodney Crocker RN  [facility]

## 2023-03-03 NOTE — PROGRESS NOTES
ANTICOAGULATION MANAGEMENT     William Lechuga 82 year old male is on warfarin with therapeutic INR result. (Goal INR 2.0-3.0)    Recent labs: (last 7 days)     03/02/23  0926   INR 2.0*       ASSESSMENT       Source(s): Chart Review    Previous INR was Therapeutic last 2(+) visits    Medication, diet, health changes since last INR chart reviewed; none identified             PLAN     Recommended plan for no diet, medication or health factor changes affecting INR     Dosing Instructions: Continue your current warfarin dose with next INR in 6 weeks       Summary  As of 3/3/2023    Full warfarin instructions:  2.5 mg every Mon; 5 mg all other days   Next INR check:  4/14/2023             Detailed voice message left for William with dosing instructions and follow up date.   Sent [employer] message with dosing and follow up instructions    Patient using outside facility for labs    Education provided:     Please call back if any changes to your diet, medications or how you've been taking warfarin    Contact [phone]  with any changes, questions or concerns.     Plan made per [facility] anticoagulation protocol    Rodney WATSON RN  Anticoagulation Clinic  3/3/2023    _______________________________________________________________________     Anticoagulation Episode Summary     Current INR goal:  2.0-3.0   TTR:  97.8 % (1 y)   Target end date:  Indefinite   Send INR reminders to:  [facility]    Indications    Current use of long term anticoagulation [Z79.01]  Chronic atrial fibrillation (H) [I48.20]           Comments:           Anticoagulation Care Providers     Provider Role Specialty Phone number    Arelis Alexandra MD Referring Family Medicine [phone]    Lashay Donahue APRN CNP Responsible Family Medicine [phone]

## 2023-03-03 NOTE — PROGRESS NOTES
Incoming fax from [employer] [place]    Date of result 3/2    INR result 2.0

## 2023-03-06 ENCOUNTER — VIRTUAL VISIT (OUTPATIENT)
Dept: FAMILY MEDICINE | Facility: CLINIC | Age: 83
End: 2023-03-06
Payer: MEDICARE

## 2023-03-06 DIAGNOSIS — I48.20 CHRONIC ATRIAL FIBRILLATION (H): ICD-10-CM

## 2023-03-06 DIAGNOSIS — E89.0 POSTSURGICAL HYPOTHYROIDISM: ICD-10-CM

## 2023-03-06 DIAGNOSIS — I10 HYPERTENSION GOAL BP (BLOOD PRESSURE) < 140/90: Primary | ICD-10-CM

## 2023-03-06 DIAGNOSIS — Z79.01 LONG TERM (CURRENT) USE OF ANTICOAGULANTS: ICD-10-CM

## 2023-03-06 DIAGNOSIS — C73 MALIGNANT NEOPLASM OF THYROID GLAND (H): ICD-10-CM

## 2023-03-06 PROCEDURE — 99214 OFFICE O/P EST MOD 30 MIN: CPT | Mod: VID | Performed by: FAMILY MEDICINE

## 2023-03-06 RX ORDER — LEVOTHYROXINE SODIUM 137 UG/1
137 TABLET ORAL DAILY
Qty: 90 TABLET | Refills: 3 | Status: SHIPPED | OUTPATIENT
Start: 2023-03-06 | End: 2024-02-20

## 2023-03-06 RX ORDER — WARFARIN SODIUM 5 MG/1
TABLET ORAL
Qty: 90 TABLET | Refills: 3 | Status: SHIPPED | OUTPATIENT
Start: 2023-03-06 | End: 2024-02-20

## 2023-03-06 RX ORDER — LISINOPRIL 40 MG/1
40 TABLET ORAL DAILY
Qty: 90 TABLET | Refills: 3 | Status: SHIPPED | OUTPATIENT
Start: 2023-03-06 | End: 2024-02-20

## 2023-03-06 RX ORDER — CARVEDILOL 6.25 MG/1
6.25 TABLET ORAL 2 TIMES DAILY WITH MEALS
Qty: 180 TABLET | Refills: 3 | Status: SHIPPED | OUTPATIENT
Start: 2023-03-06 | End: 2023-06-19

## 2023-04-11 ASSESSMENT — SLEEP AND FATIGUE QUESTIONNAIRES
HOW LIKELY ARE YOU TO NOD OFF OR FALL ASLEEP WHILE SITTING AND READING: SLIGHT CHANCE OF DOZING
HOW LIKELY ARE YOU TO NOD OFF OR FALL ASLEEP WHILE SITTING QUIETLY AFTER LUNCH WITHOUT ALCOHOL: WOULD NEVER DOZE
HOW LIKELY ARE YOU TO NOD OFF OR FALL ASLEEP IN A CAR, WHILE STOPPED FOR A FEW MINUTES IN TRAFFIC: WOULD NEVER DOZE
HOW LIKELY ARE YOU TO NOD OFF OR FALL ASLEEP WHILE SITTING AND TALKING TO SOMEONE: WOULD NEVER DOZE
HOW LIKELY ARE YOU TO NOD OFF OR FALL ASLEEP WHEN YOU ARE A PASSENGER IN A CAR FOR AN HOUR WITHOUT A BREAK: WOULD NEVER DOZE
HOW LIKELY ARE YOU TO NOD OFF OR FALL ASLEEP WHILE LYING DOWN TO REST IN THE AFTERNOON WHEN CIRCUMSTANCES PERMIT: SLIGHT CHANCE OF DOZING
HOW LIKELY ARE YOU TO NOD OFF OR FALL ASLEEP WHILE SITTING INACTIVE IN A PUBLIC PLACE: WOULD NEVER DOZE
HOW LIKELY ARE YOU TO NOD OFF OR FALL ASLEEP WHILE WATCHING TV: SLIGHT CHANCE OF DOZING

## 2023-04-12 ENCOUNTER — ANTICOAGULATION THERAPY VISIT (OUTPATIENT)
Dept: ANTICOAGULATION | Facility: CLINIC | Age: 83
End: 2023-04-12

## 2023-04-12 ENCOUNTER — LAB (OUTPATIENT)
Dept: LAB | Facility: CLINIC | Age: 83
End: 2023-04-12
Payer: MEDICARE

## 2023-04-12 DIAGNOSIS — I48.20 CHRONIC ATRIAL FIBRILLATION (H): Chronic | ICD-10-CM

## 2023-04-12 DIAGNOSIS — Z79.01 CURRENT USE OF LONG TERM ANTICOAGULATION: Primary | ICD-10-CM

## 2023-04-12 DIAGNOSIS — Z79.01 CURRENT USE OF LONG TERM ANTICOAGULATION: ICD-10-CM

## 2023-04-12 LAB — INR BLD: 2.4 (ref 0.9–1.1)

## 2023-04-12 PROCEDURE — 85610 PROTHROMBIN TIME: CPT

## 2023-04-12 PROCEDURE — 36416 COLLJ CAPILLARY BLOOD SPEC: CPT

## 2023-04-12 NOTE — PROGRESS NOTES
ANTICOAGULATION MANAGEMENT     William Lechuga 82 year old male is on warfarin with therapeutic INR result. (Goal INR 2.0-3.0)    Recent labs: (last 7 days)     04/12/23  1522   INR 2.4*       ASSESSMENT     Warfarin Lab Questionnaire    Warfarin Doses Last 7 Days      4/11/2023    10:30 AM   Dose in Tablet or Mg   TAB or MG? milligram (mg)     Pt Rptd Dose SUNDAY MONDAY TUESDAY WED THURS FRIDAY SATURDAY 4/11/2023  10:30 AM 5 2.5 5 5 5 5 5         4/11/2023   Warfarin Lab Questionnaire   Missed doses? No   Changes in diet or alcohol? No   Shortness of breath? No   Injuries or illness since last INR? No   Upcoming surgery, procedure? No   Medication changes? No   Abnormal bleeding? No   Best number to call with results? [phone]        Previous INR: Therapeutic last 2(+) visits  Additional findings: None       PLAN     Recommended plan for no diet, medication or health factor changes affecting INR     Dosing Instructions: Continue your current warfarin dose with next INR in 6 weeks       Summary  As of 4/12/2023    Full warfarin instructions:  2.5 mg every Mon; 5 mg all other days   Next INR check:  5/24/2023             Telephone call with William who verbalizes understanding and agrees to plan and who agrees to plan and repeated back plan correctly    Lab visit scheduled    Education provided:     Please call back if any changes to your diet, medications or how you've been taking warfarin    Plan made per [facility] anticoagulation protocol    Kareen Wright, RN  Anticoagulation Clinic  4/12/2023    _______________________________________________________________________     Anticoagulation Episode Summary     Current INR goal:  2.0-3.0   TTR:  97.8 % (1 y)   Target end date:  Indefinite   Send INR reminders to:  [facility]    Indications    Current use of long term anticoagulation [Z79.01]  Chronic atrial fibrillation (H) [I48.20]           Comments:           Anticoagulation Care Providers     Provider Role  Specialty Phone number    Arelis Alexandra MD Referring Family Medicine [phone]    Lashay Donahue APRN CNP Responsible Family Medicine [phone]

## 2023-04-18 ENCOUNTER — VIRTUAL VISIT (OUTPATIENT)
Dept: SLEEP MEDICINE | Facility: CLINIC | Age: 83
End: 2023-04-18
Payer: MEDICARE

## 2023-04-18 VITALS — BODY MASS INDEX: 31.15 KG/M2 | WEIGHT: 230 LBS | HEIGHT: 72 IN

## 2023-04-18 DIAGNOSIS — G47.33 OSA (OBSTRUCTIVE SLEEP APNEA): Primary | ICD-10-CM

## 2023-04-18 PROCEDURE — 99214 OFFICE O/P EST MOD 30 MIN: CPT | Mod: VID | Performed by: PHYSICIAN ASSISTANT

## 2023-04-18 ASSESSMENT — PAIN SCALES - GENERAL: PAINLEVEL: NO PAIN (0)

## 2023-04-18 NOTE — NURSING NOTE
Is the patient currently in the state of [place]? YES    Visit mode:VIDEO    If the visit is dropped, the patient can be reconnected by: VIDEO VISIT: Send to e-mail at: [email or web address]    Will anyone else be joining the visit? NO      How would you like to obtain your AVS? MyChart    Are changes needed to the allergy or medication list? NO    Reason for visit: CPAP Follow Up

## 2023-04-18 NOTE — PROGRESS NOTES
Virtual Visit Details    Type of service:  Video Visit   Video Start Time: 4:03PM  Video End Time:4:21PM  Originating Location (pt. Location): Home    Distant Location (provider location):  Off-site  Platform used for Video Visit: [facility]   Outpatient Sleep Medicine  Apr 18, 2023       Name: William Lechuga MRN# [id number]   Age: 82 year old YOB: 1940            Assessment and Plan:   1. NIELS (obstructive sleep apnea)    Patient's sleep apnea is adequately managed and well treated with current PAP settings 9-17pfC7T with low residual AHI of 1.5 events per hour. Continues to tolerate PAP well and daytime symptoms and nighttime sleep quality are improved with use. No indication to adjust settings. Prescription renewed for mask/supplies. Will continue nightly therapy.   - Comprehensive DME    William Lechuga will follow up in about 1 year for annual visit or of course sooner as needed.        Chief Complaint      Chief Complaint   Patient presents with     CPAP Follow Up            History of Present Illness:     William Lechuga is a 82 year old male who presents to the clinic for follow-up of their moderate obstructive sleep apnea treated with CPAP therapy. 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      Presenting symptoms included loud snoring, gasping, witnessed apnea, some fatigue, morning dry mouth, crowded oropharynx, and comorbid HTN and A-fib.  Split night PSG on 10/29/2015 (224lbs) showing AHI was 15.9, lowest oxygen saturation was 80.3% (time spent below 89% was 25.4 minutes). RDI 18.0. Periodic Limb Movement Index 22.8/hour. PLM arousal index 1.3 per hour. Patient was titrated to 5cmH2O which was considered adequate with AHI 0 and PLM index 0.      Returns today for annual visit. Got his Dreamstation 2 machine from [employer] in Feb 2023, was out of town and just  "started using earlier this month. Happy with new machine, no issues.     Overall, the patient rates their experience with PAP as 10 (0 poor, 10 great). Patient is using a full face mask. The mask is comfortable. The mask is not leaking. They are not snoring with the mask on. They are not having gasp arousals.  They are not having significant oral/nasal dryness or epistaxis.  They are not having pain/skin breakdown. The pressure settings are comfortable.     He was traveling and forgot his machine one night recently and \"it was horrible just a bugger I woke up gasping more than once I didn't sleep at all\". CPAP obviously very beneficial for him.     Bedtime is typically 9:00PM. Usually it takes about 5-15 minutes to fall asleep with the mask on. Wake time is typically 5-5:30AM.      No download today, brought machine to [facility] in [place] last week for manual download but I do not see it in chart. Data below per his hilda:  [employer]ics Auto-PAP 9-14 cmH2O download: Average use 8 hours 29 minutes per day. Mask fit 100%. Average pressure 10.1cm. CPAP 90% pressure 11.8cm. AHI 1.5    SCALES:   INSOMNIA: Insomnia Severity Score: 3   SLEEPINESS: Dwarf Sleepiness Score: 3    Past medical/surgical history, family history, social history, medications and allergies were reviewed.           Physical Examination:   Ht 1.829 m (6')   Wt 104.3 kg (230 lb)   BMI 31.19 kg/m    General appearance: Awake, alert, cooperative. Well groomed. Sitting comfortably in chair. In no apparent distress.  HEENT: Head: Normocephalic, atraumatic. Eyes:Conjunctiva clear. Sclera normal. Nose: External appearance without deformity.   Pulmonary:  Able to speak easily in full sentences. No cough or wheeze.   Skin:  No rashes or significant lesions on visible skin.   Neurologic: Alert, oriented x3.   Psychiatric: Mood euthymic. Affect congruent with full range and intensity.      CC:  Arelis Alexandra PA-C  Apr 18, 2023     [facility] " [facility] [place]  [address] , [place] [address]     [facility] [place]  [address] [facility], [place] [address]    Chart documentation was completed, in part, with [employer] voice-recognition software. Even though reviewed, some grammatical, spelling, and word errors may remain.    36 minutes spent on day of encounter doing chart review, history and exam, documentation, and further activities as noted above

## 2023-04-23 ENCOUNTER — HEALTH MAINTENANCE LETTER (OUTPATIENT)
Age: 83
End: 2023-04-23

## 2023-05-24 ENCOUNTER — LAB (OUTPATIENT)
Dept: LAB | Facility: CLINIC | Age: 83
End: 2023-05-24
Payer: MEDICARE

## 2023-05-24 ENCOUNTER — ANTICOAGULATION THERAPY VISIT (OUTPATIENT)
Dept: ANTICOAGULATION | Facility: CLINIC | Age: 83
End: 2023-05-24

## 2023-05-24 DIAGNOSIS — Z79.01 CURRENT USE OF LONG TERM ANTICOAGULATION: Primary | ICD-10-CM

## 2023-05-24 DIAGNOSIS — I48.20 CHRONIC ATRIAL FIBRILLATION (H): Chronic | ICD-10-CM

## 2023-05-24 DIAGNOSIS — Z79.01 CURRENT USE OF LONG TERM ANTICOAGULATION: ICD-10-CM

## 2023-05-24 LAB — INR BLD: 2.5 (ref 0.9–1.1)

## 2023-05-24 PROCEDURE — 85610 PROTHROMBIN TIME: CPT

## 2023-05-24 PROCEDURE — 36416 COLLJ CAPILLARY BLOOD SPEC: CPT

## 2023-05-24 NOTE — PROGRESS NOTES
ANTICOAGULATION MANAGEMENT     William Lechuga 82 year old male is on warfarin with therapeutic INR result. (Goal INR 2.0-3.0)    Recent labs: (last 7 days)     05/24/23  0831   INR 2.5*       ASSESSMENT       Source(s): Chart Review and Patient/Caregiver Call       Warfarin doses taken: Warfarin taken as instructed    Diet: No new diet changes identified    Medication/supplement changes: None noted    New illness, injury, or hospitalization: No    Signs or symptoms of bleeding or clotting: No    Previous result: Therapeutic last 2(+) visits    Additional findings: None         PLAN     Recommended plan for no diet, medication or health factor changes affecting INR     Dosing Instructions: Continue your current warfarin dose with next INR in 6 weeks       Summary  As of 5/24/2023    Full warfarin instructions:  2.5 mg every Mon; 5 mg all other days   Next INR check:  7/11/2023             Telephone call with William who verbalizes understanding and agrees to plan and who agrees to plan and repeated back plan correctly    Lab visit scheduled    Education provided:     Please call back if any changes to your diet, medications or how you've been taking warfarin    Plan made per [facility] anticoagulation protocol    Kareen Wright, RN  Anticoagulation Clinic  5/24/2023    _______________________________________________________________________     Anticoagulation Episode Summary     Current INR goal:  2.0-3.0   TTR:  97.8 % (1 y)   Target end date:  Indefinite   Send INR reminders to:  [facility] B[facility]OD    Indications    Current use of long term anticoagulation [Z79.01]  Chronic atrial fibrillation (H) [I48.20]           Comments:           Anticoagulation Care Providers     Provider Role Specialty Phone number    Arelis Alexandra MD Referring Family Medicine [phone]    Lashay Donahue APRN CNP Responsible Family Medicine [phone]

## 2023-06-12 ASSESSMENT — ENCOUNTER SYMPTOMS: ARTHRALGIAS: 1

## 2023-06-12 ASSESSMENT — ACTIVITIES OF DAILY LIVING (ADL): CURRENT_FUNCTION: NO ASSISTANCE NEEDED

## 2023-06-19 ENCOUNTER — OFFICE VISIT (OUTPATIENT)
Dept: FAMILY MEDICINE | Facility: CLINIC | Age: 83
End: 2023-06-19
Payer: MEDICARE

## 2023-06-19 VITALS
WEIGHT: 223.6 LBS | RESPIRATION RATE: 16 BRPM | TEMPERATURE: 96.8 F | DIASTOLIC BLOOD PRESSURE: 80 MMHG | HEART RATE: 63 BPM | HEIGHT: 72 IN | OXYGEN SATURATION: 98 % | SYSTOLIC BLOOD PRESSURE: 168 MMHG | BODY MASS INDEX: 30.28 KG/M2

## 2023-06-19 DIAGNOSIS — E89.0 POSTSURGICAL HYPOTHYROIDISM: ICD-10-CM

## 2023-06-19 DIAGNOSIS — R60.0 BILATERAL LOWER EXTREMITY EDEMA: ICD-10-CM

## 2023-06-19 DIAGNOSIS — I10 HYPERTENSION GOAL BP (BLOOD PRESSURE) < 140/90: ICD-10-CM

## 2023-06-19 DIAGNOSIS — E78.5 HYPERLIPIDEMIA LDL GOAL <130: ICD-10-CM

## 2023-06-19 DIAGNOSIS — Z00.00 ENCOUNTER FOR MEDICARE ANNUAL WELLNESS EXAM: Primary | ICD-10-CM

## 2023-06-19 LAB
ALBUMIN SERPL BCG-MCNC: 4.5 G/DL (ref 3.5–5.2)
ALP SERPL-CCNC: 48 U/L (ref 40–129)
ALT SERPL W P-5'-P-CCNC: 26 U/L (ref 0–70)
ANION GAP SERPL CALCULATED.3IONS-SCNC: 12 MMOL/L (ref 7–15)
AST SERPL W P-5'-P-CCNC: 35 U/L (ref 0–45)
BILIRUB SERPL-MCNC: 0.9 MG/DL
BUN SERPL-MCNC: 18 MG/DL (ref 8–23)
CALCIUM SERPL-MCNC: 9.1 MG/DL (ref 8.8–10.2)
CHLORIDE SERPL-SCNC: 101 MMOL/L (ref 98–107)
CHOLEST SERPL-MCNC: 159 MG/DL
CREAT SERPL-MCNC: 1.01 MG/DL (ref 0.67–1.17)
DEPRECATED HCO3 PLAS-SCNC: 25 MMOL/L (ref 22–29)
GFR SERPL CREATININE-BSD FRML MDRD: 75 ML/MIN/1.73M2
GLUCOSE SERPL-MCNC: 121 MG/DL (ref 70–99)
HDLC SERPL-MCNC: 78 MG/DL
LDLC SERPL CALC-MCNC: 68 MG/DL
NONHDLC SERPL-MCNC: 81 MG/DL
POTASSIUM SERPL-SCNC: 5.3 MMOL/L (ref 3.4–5.3)
PROT SERPL-MCNC: 7.2 G/DL (ref 6.4–8.3)
SODIUM SERPL-SCNC: 138 MMOL/L (ref 136–145)
TRIGL SERPL-MCNC: 65 MG/DL
TSH SERPL DL<=0.005 MIU/L-ACNC: 2.16 UIU/ML (ref 0.3–4.2)

## 2023-06-19 PROCEDURE — 99214 OFFICE O/P EST MOD 30 MIN: CPT | Mod: 25 | Performed by: FAMILY MEDICINE

## 2023-06-19 PROCEDURE — 80061 LIPID PANEL: CPT | Performed by: FAMILY MEDICINE

## 2023-06-19 PROCEDURE — 80053 COMPREHEN METABOLIC PANEL: CPT | Performed by: FAMILY MEDICINE

## 2023-06-19 PROCEDURE — 84443 ASSAY THYROID STIM HORMONE: CPT | Performed by: FAMILY MEDICINE

## 2023-06-19 PROCEDURE — G0439 PPPS, SUBSEQ VISIT: HCPCS | Performed by: FAMILY MEDICINE

## 2023-06-19 PROCEDURE — 36415 COLL VENOUS BLD VENIPUNCTURE: CPT | Performed by: FAMILY MEDICINE

## 2023-06-19 RX ORDER — CARVEDILOL 12.5 MG/1
12.5 TABLET ORAL 2 TIMES DAILY WITH MEALS
Qty: 180 TABLET | Refills: 3 | Status: SHIPPED | OUTPATIENT
Start: 2023-06-19 | End: 2024-07-02 | Stop reason: DRUGHIGH

## 2023-06-19 RX ORDER — FUROSEMIDE 20 MG
20 TABLET ORAL DAILY
Qty: 90 TABLET | Refills: 3 | Status: SHIPPED | OUTPATIENT
Start: 2023-06-19 | End: 2024-07-09

## 2023-06-19 RX ORDER — ROSUVASTATIN CALCIUM 10 MG/1
10 TABLET, COATED ORAL DAILY
Qty: 90 TABLET | Refills: 3 | Status: SHIPPED | OUTPATIENT
Start: 2023-06-19 | End: 2024-07-09

## 2023-06-19 ASSESSMENT — ENCOUNTER SYMPTOMS: ARTHRALGIAS: 1

## 2023-06-19 ASSESSMENT — ACTIVITIES OF DAILY LIVING (ADL): CURRENT_FUNCTION: NO ASSISTANCE NEEDED

## 2023-06-19 ASSESSMENT — PAIN SCALES - GENERAL: PAINLEVEL: NO PAIN (0)

## 2023-06-19 NOTE — PROGRESS NOTES
"SUBJECTIVE:   William is a 82 year old who presents for Preventive Visit.      6/19/2023     8:44 AM   Additional Questions   Roomed by Rik ESPARZA   Accompanied by OVIDIO         6/19/2023     8:44 AM   Patient Reported Additional Medications   Patient reports taking the following new medications NA     Are you in the first 12 months of your Medicare coverage?  No    Healthy Habits:    In general, how would you rate your overall health?  Good    Frequency of exercise:  6-7 days/week    Duration of exercise:  45-60 minutes    Do you usually eat at least 4 servings of fruit and vegetables a day, include whole grains    & fiber and avoid regularly eating high fat or \"junk\" foods?  Yes    Taking medications regularly:  Yes    Medication side effects:  None    Ability to successfully perform activities of daily living:  No assistance needed    Home Safety:  Throw rugs in the hallway and lack of grab bars in the bathroom    Hearing Impairment:  No hearing concerns    In the past 6 months, have you been bothered by leaking of urine?  No    In general, how would you rate your overall mental or emotional health?  Excellent      PHQ-2 Total Score:    Additional concerns today:  Yes      Hypertension Follow-up  Currently on Carvedilol 6.25 mg BID, Lasix 20 mg/day and Lisinopril 40 mg/day     Do you check your blood pressure regularly outside of the clinic? Yes     Are you following a low salt diet? No    Are your blood pressures ever more than 140 on the top number (systolic) OR more   than 90 on the bottom number (diastolic), for example 140/90? Yes  Bilateral lower extremity edema has significantly improved with discontinuing Amlodipine and starting Lasix. Tolerating well. No side effects reported.   Patient reports that his BP has been creeping up lately over the past 2-3 months.     BP Readings from Last 3 Encounters:   06/19/23 (!) 168/80   09/07/22 (!) 148/76   08/09/22 (!) 146/82         Hyperlipidemia Follow-Up      Are you " regularly taking any medication or supplement to lower your cholesterol?   Yes- Crestor.    Are you having muscle aches or other side effects that you think could be caused by your cholesterol lowering medication?  No      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 at [place].   Currently on Levothyroxine 137 mcg/day.   Last TSH-   TSH   Date Value Ref Range Status   2022 1.48 0.40 - 4.00 mU/L Final   10/19/2018 0.91 0.40 - 4.00 mU/L Final       HEALTH CARE MAINTENANCE: Up to date. Due for labs- fasting today.     Have you ever done Advance Care Planning? (For example, a Health Directive, POLST, or a discussion with a medical provider or your loved ones about your wishes): No, advance care planning information given to patient to review.  Advanced care planning was discussed at today's visit.       Fall risk  Fallen 2 or more times in the past year?: No  Any fall with injury in the past year?: No    Cognitive Screening   1) Repeat 3 items (Leader, Season, Table)    2) Clock draw: NORMAL  3) 3 item recall: Recalls 2 objects   Results: NORMAL clock, 1-2 items recalled: COGNITIVE IMPAIRMENT LESS LIKELY    Mini-CogTM Copyright S Aylin. Licensed by the author for use in [facility]; reprinted with permission (wanda@.[facility]). All rights reserved.      Do you have sleep apnea, excessive snoring or daytime drowsiness?: yes    Reviewed and updated as needed this visit by clinical staff   Tobacco  Allergies  Meds              Reviewed and updated as needed this visit by Provider                 Social History     Tobacco Use     Smoking status: Former     Packs/day: 1.00     Years: 10.00     Pack years: 10.00     Types: Cigarettes     Start date: 1956     Quit date: 1977     Years since quittin.4     Passive exposure: Never     Smokeless tobacco: Former     Quit date: 1999     Tobacco comments:     I quit using totally more than 20 years ago    Vaping Use     Vaping status: Never Used   Substance Use Topics     Alcohol use: Yes     Comment: 2-3 light beers a day           6/12/2023     9:38 AM   Alcohol Use   Prescreen: >3 drinks/day or >7 drinks/week? Yes   AUDIT SCORE  5     Do you have a current opioid prescription? No  Do you use any other controlled substances or medications that are not prescribed by a provider? None        Current providers sharing in care for this patient include:   Patient Care Team:  Arelis Alexandra MD as PCP - General (Family Medicine)  Arelis Alexandra MD as Assigned PCP  Yeny Hernandez PA-C as Assigned Sleep Provider    The following health maintenance items are reviewed in Epic and correct as of today:  Health Maintenance   Topic Date Due     COVID-19 Vaccine (6 - Pfizer series) 01/07/2023     TSH W/FREE T4 REFLEX  08/09/2023     ANNUAL REVIEW OF HM ORDERS  03/06/2024     MEDICARE ANNUAL WELLNESS VISIT  06/19/2024     FALL RISK ASSESSMENT  06/19/2024     ADVANCE CARE PLANNING  06/19/2028     DTAP/TDAP/TD IMMUNIZATION (3 - Td or Tdap) 06/07/2031     PHQ-2 (once per calendar year)  Completed     INFLUENZA VACCINE  Completed     Pneumococcal Vaccine: 65+ Years  Completed     ZOSTER IMMUNIZATION  Completed     IPV IMMUNIZATION  Aged Out     MENINGITIS IMMUNIZATION  Aged Out     Lab work is in process  Labs reviewed in EPIC  BP Readings from Last 3 Encounters:   06/19/23 (!) 168/80   09/07/22 (!) 148/76   08/09/22 (!) 146/82    Wt Readings from Last 3 Encounters:   06/19/23 101.4 kg (223 lb 9.6 oz)   04/18/23 104.3 kg (230 lb)   09/07/22 103 kg (227 lb)                  Patient Active Problem List   Diagnosis     Chronic atrial fibrillation (H)     Hypertension, goal below 140/90     Impaired fasting glucose     Hypothyroid     Hyperlipidemia LDL goal <130     Obesity     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 at [place]     NIELS (obstructive sleep apnea)- moderate  (AHI 15)     Current use of long term anticoagulation     Postsurgical hypothyroidism     Acquired cataract     Hemorrhoids     Past Surgical History:   Procedure Laterality Date     BIOPSY  2005     COLONOSCOPY  2018     HC THYROIDECTOMY  2005    Hurthle cell cancer, total thyroidectomy     JOINT REPLACEMENT, HIP RT/LT Bilateral      LAMINECT/DISCECTOMY, LUMBAR  1999    lumbar micordiskectomy (L4-5)     TONSILLECTOMY & ADENOIDECTOMY  AGE 8       Social History     Tobacco Use     Smoking status: Former     Packs/day: 1.00     Years: 10.00     Pack years: 10.00     Types: Cigarettes     Start date: 1956     Quit date: 1977     Years since quittin.4     Passive exposure: Never     Smokeless tobacco: Former     Quit date: 1999     Tobacco comments:     I quit using totally more than 20 years ago   Vaping Use     Vaping status: Never Used   Substance Use Topics     Alcohol use: Yes     Comment: 2-3 light beers a day     Family History   Problem Relation Age of Onset     Asthma Brother      Coronary Artery Disease Brother      Unknown/Adopted Daughter      Diabetes Mother      Hypertension Father      Diabetes Brother         d age 78     Asthma Brother          Current Outpatient Medications   Medication Sig Dispense Refill     carvedilol (COREG) 12.5 MG tablet Take 1 tablet (12.5 mg) by mouth 2 times daily (with meals) 180 tablet 3     Cholecalciferol (VITAMIN D3 PO) Take 500 Units by mouth daily        furosemide (LASIX) 20 MG tablet Take 1 tablet (20 mg) by mouth daily 90 tablet 3     ipratropium (ATROVENT) 0.06 % nasal spray Spray 1 spray into both nostrils 3 times daily 15 mL 3     levothyroxine (SYNTHROID/LEVOTHROID) 137 MCG tablet Take 1 tablet (137 mcg) by mouth daily 90 tablet 3     lisinopril (ZESTRIL) 40 MG tablet Take 1 tablet (40 mg) by mouth daily 90 tablet 3     rosuvastatin (CRESTOR) 10 MG tablet Take 1 tablet (10 mg) by mouth daily 90 tablet 3     warfarin ANTICOAGULANT  "(COUMADIN ANTICOAGULANT) 5 MG tablet Take daily as directed. Current dose 2.5 mg Mondays and 5 mg rest of week days. 90 tablet 3     No Known Allergies      Review of Systems   Cardiovascular: Positive for peripheral edema.   Genitourinary: Positive for impotence.   Musculoskeletal: Positive for arthralgias.         OBJECTIVE:   BP (!) 168/80   Pulse 63   Temp 96.8  F (36  C) (Temporal)   Resp 16   Ht 1.835 m (6' 0.24\")   Wt 101.4 kg (223 lb 9.6 oz)   SpO2 98%   BMI 30.12 kg/m   Estimated body mass index is 30.12 kg/m  as calculated from the following:    Height as of this encounter: 1.835 m (6' 0.24\").    Weight as of this encounter: 101.4 kg (223 lb 9.6 oz).  Physical Exam  GENERAL: healthy, alert and no distress  EYES: Eyes grossly normal to inspection, PERRL and conjunctivae and sclerae normal  HENT: ear canals and TM's normal, nose and mouth without ulcers or lesions  NECK: no adenopathy, no asymmetry, masses, or scars and thyroid normal to palpation  RESP: lungs clear to auscultation - no rales, rhonchi or wheezes  CV: regular rate and rhythm, normal S1 S2, no S3 or S4, no murmur, click or rub, no peripheral edema and peripheral pulses strong  ABDOMEN: soft, nontender, no hepatosplenomegaly, no masses and bowel sounds normal  MS: no gross musculoskeletal defects noted, no edema  SKIN: no suspicious lesions or rashes. Bilateral statis dermatitis changes. SK lesions on the back.   NEURO: Normal strength and tone, mentation intact and speech normal  PSYCH: mentation appears normal, affect normal/bright    Diagnostic Test Results:  Previous Labs reviewed in Epic  Labs drawn and in process  ASSESSMENT / PLAN:   William was seen today for wellness visit and hypertension.    Diagnoses and all orders for this visit:    Encounter for Medicare annual wellness exam  -     PRIMARY CARE FOLLOW-UP SCHEDULING; Future    Hypertension goal BP (blood pressure) < 140/90, uncontrolled  -    Increase dose: carvedilol (COREG) " "12.5 MG tablet; Take 1 tablet (12.5 mg) by mouth 2 times daily (with meals)  -    Refill:  furosemide (LASIX) 20 MG tablet; Take 1 tablet (20 mg) by mouth daily    Hyperlipidemia LDL goal <130  -    Refill:  rosuvastatin (CRESTOR) 10 MG tablet; Take 1 tablet (10 mg) by mouth daily  -     Lipid panel reflex to direct LDL Fasting; Future  -     Comprehensive metabolic panel (BMP + Alb, Alk Phos, ALT, AST, Total. Bili, TP); Future    Postsurgical hypothyroidism, on Levothyroxine  -     TSH with free T4 reflex; Future    Bilateral lower extremity edema, improved  -     Refill: furosemide (LASIX) 20 MG tablet; Take 1 tablet (20 mg) by mouth daily        Patient has been advised of split billing requirements and indicates understanding: Yes      COUNSELING:  Reviewed preventive health counseling, as reflected in patient instructions       Regular exercise       Healthy diet/nutrition      BMI:   Estimated body mass index is 30.12 kg/m  as calculated from the following:    Height as of this encounter: 1.835 m (6' 0.24\").    Weight as of this encounter: 101.4 kg (223 lb 9.6 oz).   Weight management plan: Discussed healthy diet and exercise guidelines      He reports that he quit smoking about 46 years ago. His smoking use included cigarettes. He started smoking about 67 years ago. He has a 10.00 pack-year smoking history. He has never been exposed to tobacco smoke. He quit smokeless tobacco use about 24 years ago.      Appropriate preventive services were discussed with this patient, including applicable screening as appropriate for cardiovascular disease, diabetes, osteopenia/osteoporosis, and glaucoma.  As appropriate for age/gender, discussed screening for colorectal cancer, prostate cancer, breast cancer, and cervical cancer. Checklist reviewing preventive services available has been given to the patient.    Reviewed patients plan of care and provided an AVS. The Basic Care Plan (routine screening as documented in " Health Maintenance) for William meets the Care Plan requirement. This Care Plan has been established and reviewed with the Patient.    Return in about 1 month (around 7/19/2023) for BP Recheck (Ancillary Visit).      Arelis Alexandra MD  [facility]    Identified Health Risks:    I have reviewed Opioid Use Disorder and Substance Use Disorder risk factors and made any needed referrals.

## 2023-06-19 NOTE — PATIENT INSTRUCTIONS
Patient Education   Personalized Prevention Plan  You are due for the preventive services outlined below.  Your care team is available to assist you in scheduling these services.  If you have already completed any of these items, please share that information with your care team to update in your medical record.  Health Maintenance Due   Topic Date Due     Annual Wellness Visit  06/07/2022     COVID-19 Vaccine (6 - Pfizer series) 01/07/2023

## 2023-06-26 DIAGNOSIS — R73.9 HYPERGLYCEMIA, UNSPECIFIED: ICD-10-CM

## 2023-06-26 DIAGNOSIS — Z13.1 SCREENING FOR DIABETES MELLITUS: Primary | ICD-10-CM

## 2023-07-11 ENCOUNTER — ANTICOAGULATION THERAPY VISIT (OUTPATIENT)
Dept: ANTICOAGULATION | Facility: CLINIC | Age: 83
End: 2023-07-11

## 2023-07-11 ENCOUNTER — TELEPHONE (OUTPATIENT)
Dept: FAMILY MEDICINE | Facility: CLINIC | Age: 83
End: 2023-07-11

## 2023-07-11 ENCOUNTER — LAB (OUTPATIENT)
Dept: LAB | Facility: CLINIC | Age: 83
End: 2023-07-11
Payer: MEDICARE

## 2023-07-11 DIAGNOSIS — Z79.01 CURRENT USE OF LONG TERM ANTICOAGULATION: ICD-10-CM

## 2023-07-11 DIAGNOSIS — Z79.01 CURRENT USE OF LONG TERM ANTICOAGULATION: Primary | ICD-10-CM

## 2023-07-11 DIAGNOSIS — I48.20 CHRONIC ATRIAL FIBRILLATION (H): Chronic | ICD-10-CM

## 2023-07-11 LAB — INR BLD: 2.9 (ref 0.9–1.1)

## 2023-07-11 PROCEDURE — 85610 PROTHROMBIN TIME: CPT

## 2023-07-11 PROCEDURE — 36416 COLLJ CAPILLARY BLOOD SPEC: CPT

## 2023-07-11 NOTE — PROGRESS NOTES
ANTICOAGULATION MANAGEMENT     William Lechuga 82 year old male is on warfarin with therapeutic INR result. (Goal INR 2.0-3.0)    Recent labs: (last 7 days)     07/11/23  0832   INR 2.9*       ASSESSMENT       Source(s): Chart Review and Patient/Caregiver Call       Warfarin doses taken: Warfarin taken as instructed    Diet: No new diet changes identified    Medication/supplement changes: None noted    New illness, injury, or hospitalization: No    Signs or symptoms of bleeding or clotting: No    Previous result: Therapeutic last 2(+) visits    Additional findings: None         PLAN     Recommended plan for no diet, medication or health factor changes affecting INR     Dosing Instructions: Continue your current warfarin dose with next INR in 7 weeks       Summary  As of 7/11/2023    Full warfarin instructions:  2.5 mg every Mon; 5 mg all other days   Next INR check:  8/22/2023             Telephone call with William who verbalizes understanding and agrees to plan    Patient elected to schedule next visit in 7 weeks due to a scheduling conflict    Education provided:     Contact [phone]  with any changes, questions or concerns.     Plan made per [facility] anticoagulation protocol    Sultaan Hedrick, RN  Anticoagulation Clinic  7/11/2023    _______________________________________________________________________     Anticoagulation Episode Summary     Current INR goal:  2.0-3.0   TTR:  97.8 % (1 y)   Target end date:  Indefinite   Send INR reminders to:  [facility]    Indications    Current use of long term anticoagulation [Z79.01]  Chronic atrial fibrillation (H) [I48.20]           Comments:           Anticoagulation Care Providers     Provider Role Specialty Phone number    Arelis Alexandra MD Referring Family Medicine [phone]    Lashay Donahue APRN CNP Responsible Family Medicine [phone]

## 2023-07-11 NOTE — TELEPHONE ENCOUNTER
Patient Quality Outreach    Patient is due for the following:   Hypertension -  Hypertension follow-up visit    Next Steps:   Schedule a office visit for BP recheck    Type of outreach:    Sent [employer] message.      Questions for provider review:    None           Rik Mayer CMA  Chart routed to Provider.     Plz call to schedule appt with provider. LOV 9/2019

## 2023-07-25 NOTE — TELEPHONE ENCOUNTER
Patient Quality Outreach      Next Steps:   Patient declined follow up at this time. Pt will call back to schedule appt    Type of outreach:    Phone, spoke to patient/parent. patient      Questions for provider review:    None           Rik Mayer CMA  Chart routed to Provider.

## 2023-08-21 DIAGNOSIS — J34.89 RHINORRHEA: ICD-10-CM

## 2023-08-21 RX ORDER — IPRATROPIUM BROMIDE 42 UG/1
SPRAY, METERED NASAL
Qty: 15 ML | Refills: 0 | Status: SHIPPED | OUTPATIENT
Start: 2023-08-21 | End: 2023-09-27

## 2023-08-29 ENCOUNTER — LAB (OUTPATIENT)
Dept: LAB | Facility: CLINIC | Age: 83
End: 2023-08-29
Payer: MEDICARE

## 2023-08-29 ENCOUNTER — MYC MEDICAL ADVICE (OUTPATIENT)
Dept: ANTICOAGULATION | Facility: CLINIC | Age: 83
End: 2023-08-29

## 2023-08-29 ENCOUNTER — ANTICOAGULATION THERAPY VISIT (OUTPATIENT)
Dept: ANTICOAGULATION | Facility: CLINIC | Age: 83
End: 2023-08-29

## 2023-08-29 ENCOUNTER — ALLIED HEALTH/NURSE VISIT (OUTPATIENT)
Dept: FAMILY MEDICINE | Facility: CLINIC | Age: 83
End: 2023-08-29
Payer: MEDICARE

## 2023-08-29 VITALS — DIASTOLIC BLOOD PRESSURE: 78 MMHG | SYSTOLIC BLOOD PRESSURE: 130 MMHG

## 2023-08-29 DIAGNOSIS — I48.20 CHRONIC ATRIAL FIBRILLATION (H): Chronic | ICD-10-CM

## 2023-08-29 DIAGNOSIS — Z79.01 CURRENT USE OF LONG TERM ANTICOAGULATION: ICD-10-CM

## 2023-08-29 DIAGNOSIS — Z79.01 CURRENT USE OF LONG TERM ANTICOAGULATION: Primary | ICD-10-CM

## 2023-08-29 DIAGNOSIS — I10 HYPERTENSION, GOAL BELOW 140/90: Primary | ICD-10-CM

## 2023-08-29 LAB — INR BLD: 2 (ref 0.9–1.1)

## 2023-08-29 PROCEDURE — 85610 PROTHROMBIN TIME: CPT

## 2023-08-29 PROCEDURE — 99207 PR NO CHARGE NURSE ONLY: CPT

## 2023-08-29 PROCEDURE — 36416 COLLJ CAPILLARY BLOOD SPEC: CPT

## 2023-08-29 NOTE — PROGRESS NOTES
ANTICOAGULATION MANAGEMENT     William Lechuga 82 year old male is on warfarin with therapeutic INR result. (Goal INR 2.0-3.0)    Recent labs: (last 7 days)     08/29/23  0828   INR 2.0*       ASSESSMENT     Warfarin Lab Questionnaire    Warfarin Doses Last 7 Days      8/28/2023     9:25 AM   Dose in Tablet or Mg   TAB or MG? milligram (mg)     Pt Rptd Dose SUNDAY MONDAY TUESDAY WED THURS FRIDAY SATURDAY 8/28/2023   9:25 AM 5 25 5 5 5 5 5         8/28/2023   Warfarin Lab Questionnaire   Missed doses within past 14 days? No   Changes in diet or alcohol within past 14 days? No   Medication changes since last result? No   Injuries or illness since last result? No   New shortness of breath, severe headaches or sudden changes in vision since last result? No   Abnormal bleeding since last result? No   Upcoming surgery, procedure? No     Previous result: Therapeutic last 2(+) visits  Additional findings: None       PLAN     Recommended plan for no diet, medication or health factor changes affecting INR     Dosing Instructions: Continue your current warfarin dose with next INR in 6 weeks       Summary  As of 8/29/2023      Full warfarin instructions:  2.5 mg every Mon; 5 mg all other days   Next INR check:                 Sent [employer] message with dosing and follow up instructions        Education provided:   Please call back if any changes to your diet, medications or how you've been taking warfarin    Plan made per [facility] anticoagulation protocol    Nikky Lockhart RN  Anticoagulation Clinic  8/29/2023    _______________________________________________________________________     Anticoagulation Episode Summary       Current INR goal:  2.0-3.0   TTR:  97.8 % (1 y)   Target end date:  Indefinite   Send INR reminders to:  [facility] JEREMI    Indications    Current use of long term anticoagulation [Z79.01]  Chronic atrial fibrillation (H) [I48.20]             Comments:               Anticoagulation Care Providers        Provider Role Specialty Phone number    Arelis Alexandra MD Referring Family Medicine [phone]    Lashay Donahue APRN CNP Responsible Family Medicine [phone]

## 2023-08-29 NOTE — PROGRESS NOTES
I met with William Lechuga at the request of Dr. Alexandra to recheck his blood pressure.  Blood pressure medications on the med list were reviewed with patient.    Patient has taken all medications as per usual regimen: Yes  Patient reports tolerating them without any issues or concerns: No    Vitals:    08/29/23 0836   BP: 130/78       Blood pressure was taken, previous encounter was reviewed, recorded blood pressure below 140/90.  Patient was discharged and the note will be sent to the provider for final review.    Mima Sibley MA

## 2023-09-27 DIAGNOSIS — J34.89 RHINORRHEA: ICD-10-CM

## 2023-09-27 RX ORDER — IPRATROPIUM BROMIDE 42 UG/1
SPRAY, METERED NASAL
Qty: 15 ML | Refills: 1 | Status: SHIPPED | OUTPATIENT
Start: 2023-09-27 | End: 2023-12-08

## 2023-10-17 ENCOUNTER — LAB (OUTPATIENT)
Dept: LAB | Facility: CLINIC | Age: 83
End: 2023-10-17
Payer: MEDICARE

## 2023-10-17 ENCOUNTER — ANTICOAGULATION THERAPY VISIT (OUTPATIENT)
Dept: ANTICOAGULATION | Facility: CLINIC | Age: 83
End: 2023-10-17

## 2023-10-17 DIAGNOSIS — Z79.01 CURRENT USE OF LONG TERM ANTICOAGULATION: Primary | ICD-10-CM

## 2023-10-17 DIAGNOSIS — Z79.01 CURRENT USE OF LONG TERM ANTICOAGULATION: ICD-10-CM

## 2023-10-17 DIAGNOSIS — I48.20 CHRONIC ATRIAL FIBRILLATION (H): Chronic | ICD-10-CM

## 2023-10-17 LAB — INR BLD: 2.8 (ref 0.9–1.1)

## 2023-10-17 PROCEDURE — 85610 PROTHROMBIN TIME: CPT

## 2023-10-17 PROCEDURE — 36416 COLLJ CAPILLARY BLOOD SPEC: CPT

## 2023-10-17 NOTE — PROGRESS NOTES
ANTICOAGULATION MANAGEMENT     William Lechuga 83 year old male is on warfarin with therapeutic INR result. (Goal INR 2.0-3.0)    Recent labs: (last 7 days)     10/17/23  0836   INR 2.8*       ASSESSMENT     Source(s): Chart Review and Patient/Caregiver Call     Warfarin doses taken: Warfarin taken as instructed  Diet: No new diet changes identified  Medication/supplement changes: None noted  New illness, injury, or hospitalization: No  Signs or symptoms of bleeding or clotting: No  Previous result: Therapeutic last 2(+) visits  Additional findings: None       PLAN     Recommended plan for no diet, medication or health factor changes affecting INR     Dosing Instructions: Continue your current warfarin dose with next INR in 6 weeks       Summary  As of 10/17/2023      Full warfarin instructions:  2.5 mg every Mon; 5 mg all other days   Next INR check:  11/28/2023               Telephone call with William who verbalizes understanding and agrees to plan and who agrees to plan and repeated back plan correctly    Lab visit scheduled    Education provided:   None required    Plan made per [facility] anticoagulation protocol    Shala Stiles, RN  Anticoagulation Clinic  10/17/2023    _______________________________________________________________________     Anticoagulation Episode Summary       Current INR goal:  2.0-3.0   TTR:  100.0% (1 y)   Target end date:  Indefinite   Send INR reminders to:  [facility]    Indications    Current use of long term anticoagulation [Z79.01]  Chronic atrial fibrillation (H) [I48.20]             Comments:               Anticoagulation Care Providers       Provider Role Specialty Phone number    Arelis Alexandra MD Referring Family Medicine [phone]    Lashay Donahue APRN CNP Responsible Family Medicine [phone]

## 2023-11-28 ENCOUNTER — LAB (OUTPATIENT)
Dept: LAB | Facility: CLINIC | Age: 83
End: 2023-11-28
Payer: MEDICARE

## 2023-11-28 ENCOUNTER — ANTICOAGULATION THERAPY VISIT (OUTPATIENT)
Dept: ANTICOAGULATION | Facility: CLINIC | Age: 83
End: 2023-11-28

## 2023-11-28 ENCOUNTER — DOCUMENTATION ONLY (OUTPATIENT)
Dept: ANTICOAGULATION | Facility: CLINIC | Age: 83
End: 2023-11-28

## 2023-11-28 DIAGNOSIS — I48.20 CHRONIC ATRIAL FIBRILLATION (H): Chronic | ICD-10-CM

## 2023-11-28 DIAGNOSIS — Z79.01 CURRENT USE OF LONG TERM ANTICOAGULATION: ICD-10-CM

## 2023-11-28 DIAGNOSIS — Z79.01 CURRENT USE OF LONG TERM ANTICOAGULATION: Primary | ICD-10-CM

## 2023-11-28 LAB — INR BLD: 2.4 (ref 0.9–1.1)

## 2023-11-28 PROCEDURE — 85610 PROTHROMBIN TIME: CPT

## 2023-11-28 PROCEDURE — 36416 COLLJ CAPILLARY BLOOD SPEC: CPT

## 2023-11-28 NOTE — LETTER
[facility] ANTICO[facility]ULATION [facility]  [address] [facility] [address]  Phone: [phone]  Fax: [phone]      Please Fax Results to:  [phone]    Patient:     William Lechuga MRN:  [id number]   [address] [place] [address] :  1940  Sex:  M   Phone: [phone]        INSURANCE PAYOR PLAN [employer] # SUBSCRIBER ID   Primary:  Secondary:    MEDICARE  COMMERCIAL [id number]  [id number]      [id number]  [id number]      No Known Allergies     Order Date:  2023  INR point of care       (Order ID: [id number])     Diagnosis:  Current use of long term anticoagulation (Z79.01)  Chronic atrial fibrillation (H) (I48.20)   Priority:  Routine Expiration Date:  2024 Interval:  1-6 weeks & PRN Count: 50     Comments: Fax results to: [phone]     Ordered by: Shala Stiles RN  Authorized by:  Arelis Alexandra MD   (NPI: [id number])

## 2023-11-28 NOTE — PROGRESS NOTES
ANTICOAGULATION  MANAGEMENT- Travel planning    William ALEXANDER Angelaalonzo reports upcoming travel plans to [place].    Departure date: 12/10/23  Anticipated return date: Early April 2024  Alternate contact information (if applicable): na      INR monitoring plan:     [facility] to monitor and dose while traveling. INR standing order to be mailed to: pt's home. Faxed results to be routed to [facility]     Anticoagulation calendar updated with date of next INR     Instructed to call the Anticoauglation Clinic for any changes, questions or concerns. [phone]   ?   Shala Stiles RN

## 2023-11-28 NOTE — PROGRESS NOTES
ANTICOAGULATION MANAGEMENT     William Lechuga 83 year old male is on warfarin with therapeutic INR result. (Goal INR 2.0-3.0)    Recent labs: (last 7 days)     11/28/23  0829   INR 2.4*       ASSESSMENT     Source(s): Chart Review and Patient/Caregiver Call     Warfarin doses taken: Warfarin taken as instructed  Diet: No new diet changes identified  Medication/supplement changes: None noted  New illness, injury, or hospitalization: No  Signs or symptoms of bleeding or clotting: No  Previous result: Therapeutic last 2(+) visits  Additional findings:  Pt is leaving for [place] in 2 weeks, orders will be mailed (see travel encounter)       PLAN     Recommended plan for no diet, medication or health factor changes affecting INR     Dosing Instructions: Continue your current warfarin dose with next INR in 6 weeks       Summary  As of 11/28/2023      Full warfarin instructions:  2.5 mg every Mon; 5 mg all other days   Next INR check:  1/9/2024               Telephone call with William who verbalizes understanding and agrees to plan and who agrees to plan and repeated back plan correctly    Patient using outside facility for labs    Education provided:   Importance of notifying anticoagulation clinic for: if you did not receive dosing instructions on the same day as your labs were checked    Plan made per [facility] anticoagulation protocol    Shala Stiles, ASHU  Anticoagulation Clinic  11/28/2023    _______________________________________________________________________     Anticoagulation Episode Summary       Current INR goal:  2.0-3.0   TTR:  100.0% (1 y)   Target end date:  Indefinite   Send INR reminders to:  [facility]    Indications    Current use of long term anticoagulation [Z79.01]  Chronic atrial fibrillation (H) [I48.20]             Comments:               Anticoagulation Care Providers       Provider Role Specialty Phone number    Arelis Alexandra MD Referring Family Medicine [phone]    Lashay Donahue,  [facility] [phone]

## 2024-01-10 ENCOUNTER — TRANSFERRED RECORDS (OUTPATIENT)
Dept: HEALTH INFORMATION MANAGEMENT | Facility: CLINIC | Age: 84
End: 2024-01-10
Payer: MEDICARE

## 2024-01-11 ENCOUNTER — ANTICOAGULATION THERAPY VISIT (OUTPATIENT)
Dept: ANTICOAGULATION | Facility: CLINIC | Age: 84
End: 2024-01-11
Payer: MEDICARE

## 2024-01-11 DIAGNOSIS — I48.20 CHRONIC ATRIAL FIBRILLATION (H): Chronic | ICD-10-CM

## 2024-01-11 DIAGNOSIS — Z79.01 CURRENT USE OF LONG TERM ANTICOAGULATION: Primary | ICD-10-CM

## 2024-01-11 LAB — INR (EXTERNAL): 2.4 (ref 0.9–1.1)

## 2024-01-11 NOTE — PROGRESS NOTES
ANTICOAGULATION MANAGEMENT     William Lechuga 83 year old male is on warfarin with therapeutic INR result. (Goal INR 2.0-3.0)    Recent labs: (last 7 days)     01/10/24  0800   INR 2.4*       ASSESSMENT     Source(s): Chart Review and Patient/Caregiver Call     Warfarin doses taken: Warfarin taken as instructed  Diet: No new diet changes identified  Medication/supplement changes: None noted  New illness, injury, or hospitalization: No  Signs or symptoms of bleeding or clotting: No  Previous result: Therapeutic last 2(+) visits  Additional findings: None       PLAN     Recommended plan for no diet, medication or health factor changes affecting INR     Dosing Instructions: Continue your current warfarin dose with next INR in 6 weeks       Summary  As of 1/11/2024      Full warfarin instructions:  2.5 mg every Mon; 5 mg all other days   Next INR check:  2/22/2024               Telephone call with William who verbalizes understanding and agrees to plan    Patient using outside facility for labs    Education provided:   Contact [phone]  with any changes, questions or concerns.     Plan made per [facility] anticoagulation protocol    Sultana Hedrick RN  Anticoagulation Clinic  1/11/2024    _______________________________________________________________________     Anticoagulation Episode Summary       Current INR goal:  2.0-3.0   TTR:  100.0% (1 y)   Target end date:  Indefinite   Send INR reminders to:  [facility]    Indications    Current use of long term anticoagulation [Z79.01]  Chronic atrial fibrillation (H) [I48.20]             Comments:               Anticoagulation Care Providers       Provider Role Specialty Phone number    Arelis Alexandra MD Referring Family Medicine [phone]    Lashay Donahue APRN CNP Responsible Family Medicine [phone]

## 2024-01-11 NOTE — PROGRESS NOTES
Incoming fax from  [facility]    Date of result 01/10/2024    INR result 2.4        Amanda Siegel RN    [facility] Anticoagulation [facility]

## 2024-02-20 DIAGNOSIS — J34.89 RHINORRHEA: ICD-10-CM

## 2024-02-20 DIAGNOSIS — E89.0 POSTSURGICAL HYPOTHYROIDISM: ICD-10-CM

## 2024-02-20 DIAGNOSIS — I10 HYPERTENSION GOAL BP (BLOOD PRESSURE) < 140/90: ICD-10-CM

## 2024-02-20 DIAGNOSIS — I48.20 CHRONIC ATRIAL FIBRILLATION (H): ICD-10-CM

## 2024-02-20 RX ORDER — WARFARIN SODIUM 5 MG/1
TABLET ORAL
Qty: 90 TABLET | Refills: 1 | Status: SHIPPED | OUTPATIENT
Start: 2024-02-20 | End: 2024-07-09

## 2024-02-20 RX ORDER — LEVOTHYROXINE SODIUM 137 UG/1
137 TABLET ORAL DAILY
Qty: 90 TABLET | Refills: 0 | Status: SHIPPED | OUTPATIENT
Start: 2024-02-20 | End: 2024-05-17

## 2024-02-20 RX ORDER — IPRATROPIUM BROMIDE 42 UG/1
SPRAY, METERED NASAL
Qty: 15 ML | Refills: 0 | Status: SHIPPED | OUTPATIENT
Start: 2024-02-20 | End: 2024-04-22

## 2024-02-20 RX ORDER — LISINOPRIL 40 MG/1
40 TABLET ORAL DAILY
Qty: 90 TABLET | Refills: 0 | Status: SHIPPED | OUTPATIENT
Start: 2024-02-20 | End: 2024-05-17

## 2024-02-20 NOTE — TELEPHONE ENCOUNTER
ANTICOAGULATION MANAGEMENT:  Medication Refill    Anticoagulation Summary  As of 1/11/2024      Warfarin maintenance plan:  2.5 mg (5 mg x 0.5) every Mon; 5 mg (5 mg x 1) all other days   Next INR check:  2/22/2024   Target end date:  Indefinite    Indications    Current use of long term anticoagulation [Z79.01]  Chronic atrial fibrillation (H) [I48.20]                 Anticoagulation Care Providers       Provider Role Specialty Phone number    Arelis Alexandra MD Referring Family Medicine [phone]    Lashay Donahue APRN CNP Responsible Family Medicine [phone]            Refill Criteria    Visit with referring provider/group: Meets criteria: office visit within referring provider group in the last 1 year on 6/19/23    [facility] referral signed last signed: 03/04/2023; within last year: Yes    Lab monitoring not exceeding 2 weeks overdue: Yes    William meets all criteria for refill. Rx instructions and quantity supplied updated to match patient's current dosing plan. Warfarin 90 day supply with 1 refill granted per [facility] protocol     Sultana Hedrick RN  Anticoagulation Clinic

## 2024-02-21 RX ORDER — CARVEDILOL 6.25 MG/1
6.25 TABLET ORAL 2 TIMES DAILY WITH MEALS
Qty: 180 TABLET | Refills: 0 | Status: SHIPPED | OUTPATIENT
Start: 2024-02-21 | End: 2024-05-17

## 2024-02-27 ENCOUNTER — MYC MEDICAL ADVICE (OUTPATIENT)
Dept: ANTICOAGULATION | Facility: CLINIC | Age: 84
End: 2024-02-27
Payer: MEDICARE

## 2024-03-12 ENCOUNTER — DOCUMENTATION ONLY (OUTPATIENT)
Dept: ANTICOAGULATION | Facility: CLINIC | Age: 84
End: 2024-03-12
Payer: MEDICARE

## 2024-03-12 DIAGNOSIS — Z79.01 CURRENT USE OF LONG TERM ANTICOAGULATION: Primary | ICD-10-CM

## 2024-03-12 DIAGNOSIS — I48.20 CHRONIC ATRIAL FIBRILLATION (H): ICD-10-CM

## 2024-03-12 NOTE — PROGRESS NOTES
ANTICOAGULATION CLINIC REFERRAL RENEWAL REQUEST       An annual renewal order is required for all patients referred to [facility].?  Please review and sign the pended referral order for William Lechuga.       ANTICOAGULATION SUMMARY      Warfarin indication(s)   Atrial Fibrillation    Mechanical heart valve present?  NO       Current goal range   INR: 2.0-3.0     Goal appropriate for indication? Goal INR 2-3, standard for indication(s) above     Time in Therapeutic Range (TTR)  (Goal > 60%) 100%       Office visit with referring provider's group within last year yes on 6/19/23     [facility]

## 2024-03-13 ENCOUNTER — ANTICOAGULATION THERAPY VISIT (OUTPATIENT)
Dept: ANTICOAGULATION | Facility: CLINIC | Age: 84
End: 2024-03-13

## 2024-03-13 ENCOUNTER — LAB (OUTPATIENT)
Dept: LAB | Facility: CLINIC | Age: 84
End: 2024-03-13
Payer: MEDICARE

## 2024-03-13 DIAGNOSIS — Z79.01 CURRENT USE OF LONG TERM ANTICOAGULATION: Primary | ICD-10-CM

## 2024-03-13 DIAGNOSIS — I48.20 CHRONIC ATRIAL FIBRILLATION (H): Chronic | ICD-10-CM

## 2024-03-13 DIAGNOSIS — Z79.01 CURRENT USE OF LONG TERM ANTICOAGULATION: ICD-10-CM

## 2024-03-13 DIAGNOSIS — I48.20 CHRONIC ATRIAL FIBRILLATION (H): ICD-10-CM

## 2024-03-13 LAB — INR BLD: 3.2 (ref 0.9–1.1)

## 2024-03-13 PROCEDURE — 36416 COLLJ CAPILLARY BLOOD SPEC: CPT

## 2024-03-13 PROCEDURE — 85610 PROTHROMBIN TIME: CPT

## 2024-03-13 NOTE — PROGRESS NOTES
ANTICOAGULATION MANAGEMENT     William Lechuga 83 year old male is on warfarin with supratherapeutic INR result. (Goal INR 2.0-3.0)    Recent labs: (last 7 days)     03/13/24  0738   INR 3.2*       ASSESSMENT     Source(s): Chart Review and Patient/Caregiver Call     Warfarin doses taken: Warfarin taken as instructed  Diet: See below  Medication/supplement changes: None noted  New illness, injury, or hospitalization: No  Signs or symptoms of bleeding or clotting: No  Previous result: Therapeutic last 2(+) visits  Additional findings:  Pt wife passed away 2 weeks ago after being really sick. Pt has been under a lot more stress and had changes to lifestyle/intake since wife passing.         PLAN     Recommended plan for temporary change(s) affecting INR     Dosing Instructions: Continue your current warfarin dose with next INR in 2 weeks       Summary  As of 3/13/2024      Full warfarin instructions:  2.5 mg every Mon; 5 mg all other days   Next INR check:  3/27/2024               Telephone call with William who verbalizes understanding and agrees to plan    Patient offered & declined to schedule next visit - out walking, will call back to schedule when he has his calendar.     Education provided:   Goal range and lab monitoring: goal range and significance of current result and Importance of therapeutic range    Plan made per [facility] anticoagulation protocol    Obi Reaves RN  Anticoagulation Clinic  3/13/2024    _______________________________________________________________________     Anticoagulation Episode Summary       Current INR goal:  2.0-3.0   TTR:  95.7% (1 y)   Target end date:  Indefinite   Send INR reminders to:  [facility]    Indications    Current use of long term anticoagulation [Z79.01]  Chronic atrial fibrillation (H) [I48.20]             Comments:               Anticoagulation Care Providers       Provider Role Specialty Phone number    Arelis Alexandra MD Referring Family Medicine [phone]     Lashay Donahue APRN [facility] Medicine [phone]

## 2024-03-27 ENCOUNTER — ANTICOAGULATION THERAPY VISIT (OUTPATIENT)
Dept: ANTICOAGULATION | Facility: CLINIC | Age: 84
End: 2024-03-27

## 2024-03-27 ENCOUNTER — LAB (OUTPATIENT)
Dept: LAB | Facility: CLINIC | Age: 84
End: 2024-03-27
Payer: MEDICARE

## 2024-03-27 DIAGNOSIS — Z79.01 CURRENT USE OF LONG TERM ANTICOAGULATION: Primary | ICD-10-CM

## 2024-03-27 DIAGNOSIS — I48.20 CHRONIC ATRIAL FIBRILLATION (H): ICD-10-CM

## 2024-03-27 DIAGNOSIS — Z79.01 CURRENT USE OF LONG TERM ANTICOAGULATION: ICD-10-CM

## 2024-03-27 DIAGNOSIS — I48.20 CHRONIC ATRIAL FIBRILLATION (H): Chronic | ICD-10-CM

## 2024-03-27 LAB — INR BLD: 2.6 (ref 0.9–1.1)

## 2024-03-27 PROCEDURE — 36416 COLLJ CAPILLARY BLOOD SPEC: CPT

## 2024-03-27 PROCEDURE — 85610 PROTHROMBIN TIME: CPT

## 2024-03-27 NOTE — PROGRESS NOTES
ANTICOAGULATION MANAGEMENT     William Lechuga 83 year old male is on warfarin with therapeutic INR result. (Goal INR 2.0-3.0)    Recent labs: (last 7 days)     03/27/24  1017   INR 2.6*       ASSESSMENT     Source(s): Chart Review and Patient/Caregiver Call     Warfarin doses taken: Warfarin taken as instructed  Diet: No new diet changes identified  Medication/supplement changes: None noted  New illness, injury, or hospitalization: No  Signs or symptoms of bleeding or clotting: No  Previous result: Supratherapeutic  Additional findings: None       PLAN     Recommended plan for no diet, medication or health factor changes affecting INR     Dosing Instructions: Continue your current warfarin dose with next INR in 4 weeks       Summary  As of 3/27/2024      Full warfarin instructions:  2.5 mg every Mon; 5 mg all other days   Next INR check:  4/24/2024               Telephone call with William who verbalizes understanding and agrees to plan    Lab visit scheduled    Education provided:   Please call back if any changes to your diet, medications or how you've been taking warfarin  Contact [phone]  with any changes, questions or concerns.     Plan made per [facility] anticoagulation protocol    Preeti Amor RN  Anticoagulation Clinic  3/27/2024    _______________________________________________________________________     Anticoagulation Episode Summary       Current INR goal:  2.0-3.0   TTR:  94.4% (1 y)   Target end date:  Indefinite   Send INR reminders to:  [facility]    Indications    Current use of long term anticoagulation [Z79.01]  Chronic atrial fibrillation (H) [I48.20]             Comments:               Anticoagulation Care Providers       Provider Role Specialty Phone number    Arelis Alexandra MD Referring Family Medicine [phone]    Lashay Donahue APRN CNP Responsible Family Medicine [phone]

## 2024-04-22 DIAGNOSIS — J34.89 RHINORRHEA: ICD-10-CM

## 2024-04-22 RX ORDER — IPRATROPIUM BROMIDE 42 UG/1
SPRAY, METERED NASAL
Qty: 15 ML | Refills: 0 | Status: SHIPPED | OUTPATIENT
Start: 2024-04-22 | End: 2024-06-10

## 2024-04-24 ENCOUNTER — ANTICOAGULATION THERAPY VISIT (OUTPATIENT)
Dept: ANTICOAGULATION | Facility: CLINIC | Age: 84
End: 2024-04-24

## 2024-04-24 ENCOUNTER — LAB (OUTPATIENT)
Dept: LAB | Facility: CLINIC | Age: 84
End: 2024-04-24
Payer: MEDICARE

## 2024-04-24 DIAGNOSIS — Z79.01 CURRENT USE OF LONG TERM ANTICOAGULATION: ICD-10-CM

## 2024-04-24 DIAGNOSIS — I48.20 CHRONIC ATRIAL FIBRILLATION (H): ICD-10-CM

## 2024-04-24 DIAGNOSIS — I48.20 CHRONIC ATRIAL FIBRILLATION (H): Chronic | ICD-10-CM

## 2024-04-24 DIAGNOSIS — Z79.01 CURRENT USE OF LONG TERM ANTICOAGULATION: Primary | ICD-10-CM

## 2024-04-24 LAB — INR BLD: 2.8 (ref 0.9–1.1)

## 2024-04-24 PROCEDURE — 85610 PROTHROMBIN TIME: CPT

## 2024-04-24 PROCEDURE — 36416 COLLJ CAPILLARY BLOOD SPEC: CPT

## 2024-04-24 NOTE — PROGRESS NOTES
ANTICOAGULATION MANAGEMENT     William Lechuga 83 year old male is on warfarin with therapeutic INR result. (Goal INR 2.0-3.0)    Recent labs: (last 7 days)     04/24/24  0915   INR 2.8*       ASSESSMENT     Warfarin Lab Questionnaire    Warfarin Doses Last 7 Days      4/23/2024    10:50 AM   Dose in Tablet or Mg   TAB or MG? milligram (mg)     Pt Rptd Dose SUNDAY MONDAY TUESDAY WED THURS FRIDAY SATURDAY 4/23/2024  10:50 AM 5 2.5 5 5 5 5 5         4/23/2024   Warfarin Lab Questionnaire   Missed doses within past 14 days? No   Changes in diet or alcohol within past 14 days? No   Medication changes since last result? No   Injuries or illness since last result? No   New shortness of breath, severe headaches or sudden changes in vision since last result? No   Abnormal bleeding since last result? No   Upcoming surgery, procedure? No     Previous result: Therapeutic last 2(+) visits  Additional findings: None       PLAN     Recommended plan for no diet, medication or health factor changes affecting INR     Dosing Instructions: Continue your current warfarin dose with next INR in 5 weeks       Summary  As of 4/24/2024      Full warfarin instructions:  2.5 mg every Mon; 5 mg all other days   Next INR check:  5/29/2024               Telephone call with William who verbalizes understanding and agrees to plan  My chart message also sent     Lab visit scheduled    Education provided:   Contact [phone]  with any changes, questions or concerns.     Plan made per [facility] anticoagulation protocol    Sultana Hedrick RN  Anticoagulation Clinic  4/24/2024    _______________________________________________________________________     Anticoagulation Episode Summary       Current INR goal:  2.0-3.0   TTR:  94.4% (1 y)   Target end date:  Indefinite   Send INR reminders to:  [facility]    Indications    Current use of long term anticoagulation [Z79.01]  Chronic atrial fibrillation (H) [I48.20]             Comments:                Anticoagulation Care Providers       Provider Role Specialty Phone number    Arelis Alexandra MD Referring Family Medicine [phone]    Lashay Donahue APRN [facility] Family Medicine [phone]

## 2024-04-25 DIAGNOSIS — G47.33 OBSTRUCTIVE SLEEP APNEA (ADULT) (PEDIATRIC): Primary | ICD-10-CM

## 2024-05-16 ENCOUNTER — MYC MEDICAL ADVICE (OUTPATIENT)
Dept: FAMILY MEDICINE | Facility: CLINIC | Age: 84
End: 2024-05-16
Payer: MEDICARE

## 2024-05-17 DIAGNOSIS — E89.0 POSTSURGICAL HYPOTHYROIDISM: ICD-10-CM

## 2024-05-17 DIAGNOSIS — I10 HYPERTENSION GOAL BP (BLOOD PRESSURE) < 140/90: ICD-10-CM

## 2024-05-17 RX ORDER — LISINOPRIL 40 MG/1
40 TABLET ORAL DAILY
Qty: 90 TABLET | Refills: 0 | Status: SHIPPED | OUTPATIENT
Start: 2024-05-17 | End: 2024-07-09

## 2024-05-17 RX ORDER — CARVEDILOL 6.25 MG/1
6.25 TABLET ORAL 2 TIMES DAILY WITH MEALS
Qty: 180 TABLET | Refills: 0 | Status: SHIPPED | OUTPATIENT
Start: 2024-05-17 | End: 2024-07-09

## 2024-05-17 RX ORDER — LEVOTHYROXINE SODIUM 137 UG/1
137 TABLET ORAL DAILY
Qty: 90 TABLET | Refills: 0 | Status: SHIPPED | OUTPATIENT
Start: 2024-05-17 | End: 2024-07-12

## 2024-05-17 ASSESSMENT — PATIENT HEALTH QUESTIONNAIRE - PHQ9
SUM OF ALL RESPONSES TO PHQ QUESTIONS 1-9: 0
SUM OF ALL RESPONSES TO PHQ QUESTIONS 1-9: 0

## 2024-05-17 NOTE — TELEPHONE ENCOUNTER
Patient Quality Outreach    Patient is due for the following:   Depression  -  PHQ-9 needed and Depression follow-up visit  Physical Annual Wellness Visit  There are no preventive care reminders to display for this patient.    Next Steps:   Schedule a Annual Wellness Visit    Type of outreach:    Sent [employer] message.    Questions for provider review:    None           Giuliana Nagy MA

## 2024-05-29 ENCOUNTER — ANTICOAGULATION THERAPY VISIT (OUTPATIENT)
Dept: ANTICOAGULATION | Facility: CLINIC | Age: 84
End: 2024-05-29

## 2024-05-29 ENCOUNTER — LAB (OUTPATIENT)
Dept: LAB | Facility: CLINIC | Age: 84
End: 2024-05-29
Payer: MEDICARE

## 2024-05-29 DIAGNOSIS — Z79.01 CURRENT USE OF LONG TERM ANTICOAGULATION: Primary | ICD-10-CM

## 2024-05-29 DIAGNOSIS — Z79.01 CURRENT USE OF LONG TERM ANTICOAGULATION: ICD-10-CM

## 2024-05-29 DIAGNOSIS — I48.20 CHRONIC ATRIAL FIBRILLATION (H): Chronic | ICD-10-CM

## 2024-05-29 DIAGNOSIS — I48.20 CHRONIC ATRIAL FIBRILLATION (H): ICD-10-CM

## 2024-05-29 LAB — INR BLD: 3 (ref 0.9–1.1)

## 2024-05-29 PROCEDURE — 85610 PROTHROMBIN TIME: CPT

## 2024-05-29 PROCEDURE — 36416 COLLJ CAPILLARY BLOOD SPEC: CPT

## 2024-05-29 NOTE — PROGRESS NOTES
ANTICOAGULATION MANAGEMENT     William Lechuga 83 year old male is on warfarin with therapeutic INR result. (Goal INR 2.0-3.0)    Recent labs: (last 7 days)     05/29/24  0933   INR 3.0*       ASSESSMENT     Warfarin Lab Questionnaire    Warfarin Doses Last 7 Days      5/29/2024     9:27 AM   Dose in Tablet or Mg   TAB or MG? milligram (mg)     Pt Rptd Dose SUNDAY MONDAY TUESDAY WED THURS FRIDAY SATURDAY 5/29/2024   9:27 AM 5 2.5 5 5 5 5 5         5/29/2024   Warfarin Lab Questionnaire   Missed doses within past 14 days? No   Changes in diet or alcohol within past 14 days? No   Medication changes since last result? No   Injuries or illness since last result? No   New shortness of breath, severe headaches or sudden changes in vision since last result? No   Abnormal bleeding since last result? No   Upcoming surgery, procedure? No   Best number to call with results? [phone]     Previous result: Therapeutic last 2(+) visits  Additional findings: None       PLAN     Recommended plan for no diet, medication or health factor changes affecting INR     Dosing Instructions: Continue your current warfarin dose with next INR in 6 weeks       Summary  As of 5/29/2024      Full warfarin instructions:  2.5 mg every Mon; 5 mg all other days   Next INR check:  7/10/2024               Detailed voice message left for William with dosing instructions and follow up date.     Contact [phone]  to schedule and with any changes, questions or concerns.     Education provided:   Please call back if any changes to your diet, medications or how you've been taking warfarin    Plan made per [facility] anticoagulation protocol    Shala Stiles RN  Anticoagulation Clinic  5/29/2024    _______________________________________________________________________     Anticoagulation Episode Summary       Current INR goal:  2.0-3.0   TTR:  94.4% (1 y)   Target end date:  Indefinite   Send INR reminders to:  [facility]    Indications    Current use of  long term anticoagulation [Z79.01]  Chronic atrial fibrillation (H) [I48.20]             Comments:               Anticoagulation Care Providers       Provider Role Specialty Phone number    Arelis Alexandra MD Referring Family Medicine [phone]    Lashay Donahue APRN [facility] Family Medicine [phone]

## 2024-06-10 DIAGNOSIS — J34.89 RHINORRHEA: ICD-10-CM

## 2024-06-10 RX ORDER — IPRATROPIUM BROMIDE 42 UG/1
SPRAY, METERED NASAL
Qty: 15 ML | Refills: 0 | Status: SHIPPED | OUTPATIENT
Start: 2024-06-10 | End: 2024-07-09

## 2024-06-27 ASSESSMENT — SLEEP AND FATIGUE QUESTIONNAIRES
HOW LIKELY ARE YOU TO NOD OFF OR FALL ASLEEP WHILE SITTING AND TALKING TO SOMEONE: WOULD NEVER DOZE
HOW LIKELY ARE YOU TO NOD OFF OR FALL ASLEEP WHEN YOU ARE A PASSENGER IN A CAR FOR AN HOUR WITHOUT A BREAK: WOULD NEVER DOZE
HOW LIKELY ARE YOU TO NOD OFF OR FALL ASLEEP IN A CAR, WHILE STOPPED FOR A FEW MINUTES IN TRAFFIC: WOULD NEVER DOZE
HOW LIKELY ARE YOU TO NOD OFF OR FALL ASLEEP WHILE LYING DOWN TO REST IN THE AFTERNOON WHEN CIRCUMSTANCES PERMIT: SLIGHT CHANCE OF DOZING
HOW LIKELY ARE YOU TO NOD OFF OR FALL ASLEEP WHILE SITTING AND READING: WOULD NEVER DOZE
HOW LIKELY ARE YOU TO NOD OFF OR FALL ASLEEP WHILE SITTING INACTIVE IN A PUBLIC PLACE: WOULD NEVER DOZE
HOW LIKELY ARE YOU TO NOD OFF OR FALL ASLEEP WHILE WATCHING TV: SLIGHT CHANCE OF DOZING
HOW LIKELY ARE YOU TO NOD OFF OR FALL ASLEEP WHILE SITTING QUIETLY AFTER LUNCH WITHOUT ALCOHOL: SLIGHT CHANCE OF DOZING

## 2024-07-01 NOTE — PROGRESS NOTES
Virtual Visit Details    Type of service:  Video Visit   Video Start Time: 8:55AM  Video End Time:9:07AM  Originating Location (pt. Location): Home    Distant Location (provider location):  Off-site  Platform used for Video Visit: [facility]   Outpatient Sleep Medicine  Jul 2, 2024       Name: William Lechuga MRN# [id number]   Age: 83 year old YOB: 1940            Assessment and Plan:   1. NIELS (obstructive sleep apnea)    Patient's sleep apnea is adequately managed and well treated with current PAP settings [id number] with low residual AHI of 1.5 events per hour. Compliance is excellent.  Tolerating PAP well. No indication to adjust settings. He will continue nightly therapy. Prescription renewed for mask/supplies. Follow-up in 1 year for annual follow-up or sooner prn.     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      Presenting symptoms included loud snoring, gasping, witnessed apnea, some fatigue, morning dry mouth, crowded oropharynx, and comorbid HTN and A-fib.  Split night PSG on 10/29/2015 (224lbs) showing AHI was 15.9, lowest oxygen saturation was 80.3% (time spent below 89% was 25.4 minutes). RDI 18.0. Periodic Limb Movement Index 22.8/hour. PLM arousal index 1.3 per hour. Patient was titrated to 5cmH2O with AHI 0 and PLM index 0.      Returns today for annual visit. Overall, the patient rates their experience with PAP as 9 (0 poor, 10 great). Patient is using a full face mask. The mask is comfortable. The mask is not leaking. They are not snoring with the mask on. They are not having gasp arousals.  They are not  having significant oral/nasal dryness or epistaxis.  They are not having pain/skin breakdown. The pressure settings are comfortable.     Bedtime is typically 9:00PM. Usually it takes about 15 minutes to fall asleep with the mask on. Wake time is typically 5:00AM.  Patient is using PAP therapy 8 hours per night. The patient is usually getting 6.5-7 hours of sleep per night. Does feel well rested in AM     Resp[employer]ics Auto-PAP 9-14 cmH2O download: 30 total days of use. 0 nonuse days.  Average use 7 hours 52 minutes per day.  100% days with >4 hours use.  Large leak 1.5 min per day average. CPAP 90% pressure 10.5cm. AHI 1.5    SCALES:   INSOMNIA: Insomnia Severity Score: 3   SLEEPINESS: [place] Sleepiness Score: 3    Past medical/surgical history, family history, social history, medications and allergies were reviewed.           Physical Examination:   Ht 1.829 m (6')   Wt 102.1 kg (225 lb)   BMI 30.52 kg/m    General appearance: Awake, alert, cooperative. Well groomed. Sitting comfortably in chair. In no apparent distress.  HEENT: Head: Normocephalic, atraumatic. Eyes:Conjunctiva clear. Sclera normal. Nose: External appearance without deformity.   Pulmonary:  Able to speak easily in full sentences. No cough or wheeze.   Skin:  No rashes or significant lesions on visible skin.   Neurologic: Alert, oriented x3.   Psychiatric: Mood euthymic. Affect congruent with full range and intensity.      CC:  Arelis Alexandra PA-C  Jul 2, 2024     [facility]  [address] [place] [address]     [facility]  [address] [address]    Chart documentation was completed, in part, with [employer] voice-recognition software. Even though reviewed, some grammatical, spelling, and word errors may remain.    21 minutes spent on day of encounter doing chart review, history and exam, documentation, and further activities as noted above

## 2024-07-02 ENCOUNTER — VIRTUAL VISIT (OUTPATIENT)
Dept: SLEEP MEDICINE | Facility: CLINIC | Age: 84
End: 2024-07-02
Payer: MEDICARE

## 2024-07-02 VITALS — BODY MASS INDEX: 30.48 KG/M2 | HEIGHT: 72 IN | WEIGHT: 225 LBS

## 2024-07-02 DIAGNOSIS — G47.33 OSA (OBSTRUCTIVE SLEEP APNEA): Primary | ICD-10-CM

## 2024-07-02 PROCEDURE — 99213 OFFICE O/P EST LOW 20 MIN: CPT | Mod: 95 | Performed by: PHYSICIAN ASSISTANT

## 2024-07-02 ASSESSMENT — PAIN SCALES - GENERAL: PAINLEVEL: NO PAIN (0)

## 2024-07-02 NOTE — NURSING NOTE
Is the patient currently in the state of [place]? YES    Visit mode:VIDEO    If the visit is dropped, the patient can be reconnected by: VIDEO VISIT: Text to cell phone:   Telephone Information:   Mobile [phone]       Will anyone else be joining the visit? NO  (If patient encounters technical issues they should call [phone])    How would you like to obtain your AVS? MyChart    Are changes needed to the allergy or medication list? No    Are refills needed on medications prescribed by this physician? NO    Has patient had flu shot for current/most recent flu season? If so, when? Yes:  09/23/2023 through [facility] per pt      Reason for visit: DAVID JOYF

## 2024-07-05 SDOH — HEALTH STABILITY: PHYSICAL HEALTH: ON AVERAGE, HOW MANY MINUTES DO YOU ENGAGE IN EXERCISE AT THIS LEVEL?: 50 MIN

## 2024-07-05 SDOH — HEALTH STABILITY: PHYSICAL HEALTH: ON AVERAGE, HOW MANY DAYS PER WEEK DO YOU ENGAGE IN MODERATE TO STRENUOUS EXERCISE (LIKE A BRISK WALK)?: 6 DAYS

## 2024-07-05 ASSESSMENT — SOCIAL DETERMINANTS OF HEALTH (SDOH): HOW OFTEN DO YOU GET TOGETHER WITH FRIENDS OR RELATIVES?: THREE TIMES A WEEK

## 2024-07-09 ENCOUNTER — ANTICOAGULATION THERAPY VISIT (OUTPATIENT)
Dept: ANTICOAGULATION | Facility: CLINIC | Age: 84
End: 2024-07-09

## 2024-07-09 ENCOUNTER — OFFICE VISIT (OUTPATIENT)
Dept: FAMILY MEDICINE | Facility: CLINIC | Age: 84
End: 2024-07-09
Attending: FAMILY MEDICINE
Payer: MEDICARE

## 2024-07-09 VITALS
HEART RATE: 77 BPM | WEIGHT: 223.4 LBS | DIASTOLIC BLOOD PRESSURE: 82 MMHG | TEMPERATURE: 97.6 F | SYSTOLIC BLOOD PRESSURE: 132 MMHG | HEIGHT: 71 IN | OXYGEN SATURATION: 97 % | RESPIRATION RATE: 15 BRPM | BODY MASS INDEX: 31.27 KG/M2

## 2024-07-09 DIAGNOSIS — J34.89 RHINORRHEA: ICD-10-CM

## 2024-07-09 DIAGNOSIS — Z00.00 ENCOUNTER FOR MEDICARE ANNUAL WELLNESS EXAM: Primary | ICD-10-CM

## 2024-07-09 DIAGNOSIS — I10 HYPERTENSION GOAL BP (BLOOD PRESSURE) < 140/90: ICD-10-CM

## 2024-07-09 DIAGNOSIS — I48.20 CHRONIC ATRIAL FIBRILLATION (H): Chronic | ICD-10-CM

## 2024-07-09 DIAGNOSIS — R73.9 HYPERGLYCEMIA: ICD-10-CM

## 2024-07-09 DIAGNOSIS — Z79.01 LONG TERM (CURRENT) USE OF ANTICOAGULANTS: ICD-10-CM

## 2024-07-09 DIAGNOSIS — R26.89 BALANCE PROBLEMS: ICD-10-CM

## 2024-07-09 DIAGNOSIS — I48.20 CHRONIC ATRIAL FIBRILLATION (H): ICD-10-CM

## 2024-07-09 DIAGNOSIS — Z79.01 CURRENT USE OF LONG TERM ANTICOAGULATION: Primary | ICD-10-CM

## 2024-07-09 DIAGNOSIS — E78.5 HYPERLIPIDEMIA LDL GOAL <130: ICD-10-CM

## 2024-07-09 DIAGNOSIS — R60.0 BILATERAL LOWER EXTREMITY EDEMA: ICD-10-CM

## 2024-07-09 LAB
ALBUMIN SERPL BCG-MCNC: 4.4 G/DL (ref 3.5–5.2)
ALP SERPL-CCNC: 61 U/L (ref 40–150)
ALT SERPL W P-5'-P-CCNC: 24 U/L (ref 0–70)
ANION GAP SERPL CALCULATED.3IONS-SCNC: 8 MMOL/L (ref 7–15)
AST SERPL W P-5'-P-CCNC: 38 U/L (ref 0–45)
BILIRUB SERPL-MCNC: 0.7 MG/DL
BUN SERPL-MCNC: 20.5 MG/DL (ref 8–23)
CALCIUM SERPL-MCNC: 8.9 MG/DL (ref 8.8–10.2)
CHLORIDE SERPL-SCNC: 101 MMOL/L (ref 98–107)
CHOLEST SERPL-MCNC: 154 MG/DL
CREAT SERPL-MCNC: 0.94 MG/DL (ref 0.67–1.17)
DEPRECATED HCO3 PLAS-SCNC: 24 MMOL/L (ref 22–29)
EGFRCR SERPLBLD CKD-EPI 2021: 81 ML/MIN/1.73M2
ERYTHROCYTE [DISTWIDTH] IN BLOOD BY AUTOMATED COUNT: 13.6 % (ref 10–15)
FASTING STATUS PATIENT QL REPORTED: NO
FASTING STATUS PATIENT QL REPORTED: NO
GLUCOSE SERPL-MCNC: 138 MG/DL (ref 70–99)
HCT VFR BLD AUTO: 44.1 % (ref 40–53)
HDLC SERPL-MCNC: 72 MG/DL
HGB BLD-MCNC: 14.7 G/DL (ref 13.3–17.7)
INR PPP: 2.52 (ref 0.85–1.15)
LDLC SERPL CALC-MCNC: 72 MG/DL
MCH RBC QN AUTO: 31.3 PG (ref 26.5–33)
MCHC RBC AUTO-ENTMCNC: 33.3 G/DL (ref 31.5–36.5)
MCV RBC AUTO: 94 FL (ref 78–100)
NONHDLC SERPL-MCNC: 82 MG/DL
PLATELET # BLD AUTO: 138 10E3/UL (ref 150–450)
POTASSIUM SERPL-SCNC: 5.5 MMOL/L (ref 3.4–5.3)
PROT SERPL-MCNC: 7 G/DL (ref 6.4–8.3)
RBC # BLD AUTO: 4.7 10E6/UL (ref 4.4–5.9)
SODIUM SERPL-SCNC: 133 MMOL/L (ref 135–145)
TRIGL SERPL-MCNC: 52 MG/DL
TSH SERPL DL<=0.005 MIU/L-ACNC: 1.63 UIU/ML (ref 0.3–4.2)
VIT B12 SERPL-MCNC: 339 PG/ML (ref 232–1245)
WBC # BLD AUTO: 4.4 10E3/UL (ref 4–11)

## 2024-07-09 PROCEDURE — 84443 ASSAY THYROID STIM HORMONE: CPT | Performed by: FAMILY MEDICINE

## 2024-07-09 PROCEDURE — 82607 VITAMIN B-12: CPT | Performed by: FAMILY MEDICINE

## 2024-07-09 PROCEDURE — 85027 COMPLETE CBC AUTOMATED: CPT | Performed by: FAMILY MEDICINE

## 2024-07-09 PROCEDURE — 80053 COMPREHEN METABOLIC PANEL: CPT | Performed by: FAMILY MEDICINE

## 2024-07-09 PROCEDURE — 83036 HEMOGLOBIN GLYCOSYLATED A1C: CPT | Performed by: FAMILY MEDICINE

## 2024-07-09 PROCEDURE — 80061 LIPID PANEL: CPT | Performed by: FAMILY MEDICINE

## 2024-07-09 PROCEDURE — 36415 COLL VENOUS BLD VENIPUNCTURE: CPT | Performed by: FAMILY MEDICINE

## 2024-07-09 PROCEDURE — 99214 OFFICE O/P EST MOD 30 MIN: CPT | Mod: 25 | Performed by: FAMILY MEDICINE

## 2024-07-09 PROCEDURE — G0439 PPPS, SUBSEQ VISIT: HCPCS | Performed by: FAMILY MEDICINE

## 2024-07-09 PROCEDURE — 85610 PROTHROMBIN TIME: CPT | Performed by: FAMILY MEDICINE

## 2024-07-09 RX ORDER — LISINOPRIL 40 MG/1
40 TABLET ORAL DAILY
Qty: 90 TABLET | Refills: 3 | Status: SHIPPED | OUTPATIENT
Start: 2024-07-09

## 2024-07-09 RX ORDER — WARFARIN SODIUM 5 MG/1
TABLET ORAL
Qty: 90 TABLET | Refills: 1 | Status: SHIPPED | OUTPATIENT
Start: 2024-07-09

## 2024-07-09 RX ORDER — FUROSEMIDE 20 MG
20 TABLET ORAL DAILY
Qty: 90 TABLET | Refills: 3 | Status: SHIPPED | OUTPATIENT
Start: 2024-07-09

## 2024-07-09 RX ORDER — ROSUVASTATIN CALCIUM 10 MG/1
10 TABLET, COATED ORAL DAILY
Qty: 90 TABLET | Refills: 3 | Status: SHIPPED | OUTPATIENT
Start: 2024-07-09

## 2024-07-09 RX ORDER — IPRATROPIUM BROMIDE 42 UG/1
2 SPRAY, METERED NASAL 3 TIMES DAILY
Qty: 15 ML | Refills: 3 | Status: SHIPPED | OUTPATIENT
Start: 2024-07-09

## 2024-07-09 RX ORDER — CARVEDILOL 6.25 MG/1
6.25 TABLET ORAL 2 TIMES DAILY WITH MEALS
Qty: 180 TABLET | Refills: 3 | Status: SHIPPED | OUTPATIENT
Start: 2024-07-09

## 2024-07-09 ASSESSMENT — PAIN SCALES - GENERAL: PAINLEVEL: NO PAIN (0)

## 2024-07-09 NOTE — PROGRESS NOTES
Preventive Care Visit  [facility] JEREMI Alexandra MD, Family Medicine  Jul 9, 2024      SUBJECTIVE:   William is a 83 year old, presenting for the following:  Medicare Visit        7/9/2024     8:09 AM   Additional Questions   Roomed by Varun   Accompanied by Self         7/9/2024     8:09 AM   Patient Reported Additional Medications   Patient reports taking the following new medications N/A     HPI    Today's PHQ-2 Score:       7/8/2024     8:26 PM   PHQ-2 ( 1999 [employer])   Q1: Little interest or pleasure in doing things 0   Q2: Feeling down, depressed or hopeless 0   PHQ-2 Score 0   Q1: Little interest or pleasure in doing things Not at all   Q2: Feeling down, depressed or hopeless Not at all   PHQ-2 Score 0           Annual Wellness Visit    William is a pleasant 83 year old male patient of [facility] here for his Annual Physical and chronic disease management.     He lost his wife to Breast cancer with mets in 2/2024. States that he is doing well. Has a good support system. Feeling grateful for the years together with her.     A-FIB - Patient has a longstanding history of chronic A-fib currently on rate control. Current treatment regimen includes Warfarin for stroke prevention and denies significant symptoms of lightheadedness, palpitations or dyspnea.   Due for INR check today.   Enrolled in the Anticoagulation clinic.    HYPERLIPIDEMIA - Patient has a long history of significant Hyperlipidemia requiring medication for treatment with recent good control. Patient reports no problems or side effects with the medication- Rosuvastatin 10 mg/day.   Last lipid panel-  Recent Labs   Lab Test 06/19/23  0930 05/01/18  0000   CHOL 159 157   HDL 78 77   LDL 68 81   TRIG 65 97       HYPERTENSION - Patient has longstanding history of HTN , currently denies any symptoms referable to elevated blood pressure. Specifically denies chest pain, palpitations, dyspnea, orthopnea, PND or peripheral edema. Blood  pressure readings have been in normal range. Current medication regimen is as listed below. Patient denies any side effects of medication.   BP Readings from Last 3 Encounters:   24 132/82   23 130/78   23 (!) 168/80     Reports taking Furosemide for hypertension that also helps with the lower extremity edema.     Uses Atrovent nasal spray that seems to help with his rhinorrhea symptoms- requesting a refill.     Additional concern-  Reports occasional balance problems. Feels like he is slowing down a bit with age- currently 83. Denies having any dizziness. No recent history of falls or near fall incidents.     HEALTH CARE MAINTENANCE: Due for labs.     Patient has been advised of split billing requirements and indicates understanding: Yes     Are you in the first 12 months of your Medicare Part B coverage?  No      Today's PHQ-2 Score:       2024     8:26 PM   PHQ-2 (  [employer])   Q1: Little interest or pleasure in doing things 0   Q2: Feeling down, depressed or hopeless 0   PHQ-2 Score 0   Q1: Little interest or pleasure in doing things Not at all   Q2: Feeling down, depressed or hopeless Not at all   PHQ-2 Score 0         Do you feel safe in your environment? Yes    Have you ever done Advance Care Planning? (For example, a Health Directive, POLST, or a discussion with a medical provider or your loved ones about your wishes)? Yes, advance care planning is on file.    Fall risk:  Fallen 2 or more times in the past year?: No  Do you have sleep apnea, excessive snoring or daytime drowsiness? : yes    Social History     Tobacco Use    Smoking status: Former     Current packs/day: 0.00     Average packs/day: 1 pack/day for 21.0 years (21.0 ttl pk-yrs)     Types: Cigarettes     Start date: 1956     Quit date: 1977     Years since quittin.5     Passive exposure: Never    Smokeless tobacco: Former     Quit date: 1999    Tobacco comments:     I quit using totally more than 20 years  ago   Substance Use Topics    Alcohol use: Yes     Comment: 2-3 light beers a day           2024     7:46 AM   Alcohol Use   Prescreen: >3 drinks/day or >7 drinks/week? Yes   AUDIT SCORE  5     Do you have a current opioid prescription? No  Do you use any other controlled substances or medications that are not prescribed by a provider? None    Current providers sharing in care for this patient include:   Patient Care Team:  Arelis Alexadnra MD as PCP - General (Family Medicine)  Arelis Alexandra MD as Assigned PCP  Yeny Hernandez PA-C as Assigned Sleep Provider    The following health maintenance items are reviewed in Epic and correct as of today:  Health Maintenance   Topic Date Due    RSV VACCINE (Pregnancy & 60+) (1 - 1-dose 60+ series) Never done    ANNUAL REVIEW OF HM ORDERS  2024    LIPID  2024    TSH W/FREE T4 REFLEX  2024    INFLUENZA VACCINE (1) 2024    MEDICARE ANNUAL WELLNESS VISIT  2025    FALL RISK ASSESSMENT  2025    ADVANCE CARE PLANNING  2029    DTAP/TDAP/TD IMMUNIZATION (3 - Td or Tdap) 2031    PHQ-2 (once per calendar year)  Completed    Pneumococcal Vaccine: 65+ Years  Completed    ZOSTER IMMUNIZATION  Completed    COVID-19 Vaccine  Completed    IPV IMMUNIZATION  Aged Out    HPV IMMUNIZATION  Aged Out    MENINGITIS IMMUNIZATION  Aged Out    RSV MONOCLONAL ANTIBODY  Aged Out           Appropriate preventive services were discussed with this patient, including applicable screening as appropriate for fall prevention, nutrition, physical activity, Tobacco-use cessation, weight loss and cognition.  Checklist reviewing preventive services available has been given to the patient.        Social History     Tobacco Use    Smoking status: Former     Current packs/day: 0.00     Average packs/day: 1 pack/day for 21.0 years (21.0 ttl pk-yrs)     Types: Cigarettes     Start date: 1956     Quit date: 1977     Years since quittin.5     " Passive exposure: Never    Smokeless tobacco: Former     Quit date: 1999    Tobacco comments:     I quit using totally more than 20 years ago   Substance Use Topics    Alcohol use: Yes     Comment: 2-3 light beers a day           2024     7:46 AM   Alcohol Use   Prescreen: >3 drinks/day or >7 drinks/week? Yes   AUDIT SCORE  5       Last PSA: No results found for: \"PSA\"    Reviewed orders with patient. Reviewed health maintenance and updated orders accordingly - Yes  Lab work is in process  Labs reviewed in EPIC  BP Readings from Last 3 Encounters:   24 132/82   23 130/78   23 (!) 168/80    Wt Readings from Last 3 Encounters:   24 101.3 kg (223 lb 6.4 oz)   24 102.1 kg (225 lb)   23 101.4 kg (223 lb 9.6 oz)                  Patient Active Problem List   Diagnosis    Chronic atrial fibrillation (H)    Hypertension, goal below 140/90    Impaired fasting glucose    Hypothyroid    Hyperlipidemia LDL goal <130    Obesity    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 at [place]    NIELS (obstructive sleep apnea)- moderate (AHI 15)    Current use of long term anticoagulation    Postsurgical hypothyroidism    Acquired cataract    Hemorrhoids     Past Surgical History:   Procedure Laterality Date    BIOPSY      COLONOSCOPY  2018    HC THYROIDECTOMY  2005    Hurthle cell cancer, total thyroidectomy    JOINT REPLACEMENT, HIP RT/LT Bilateral     LAMINECT/DISCECTOMY, LUMBAR  1999    lumbar micordiskectomy (L4-5)    TONSILLECTOMY & ADENOIDECTOMY  AGE 8       Social History     Tobacco Use    Smoking status: Former     Current packs/day: 0.00     Average packs/day: 1 pack/day for 21.0 years (21.0 ttl pk-yrs)     Types: Cigarettes     Start date: 1956     Quit date: 1977     Years since quittin.5     Passive exposure: Never    Smokeless tobacco: Former     Quit date: 1999    Tobacco comments:     I quit using " totally more than 20 years ago   Substance Use Topics    Alcohol use: Yes     Comment: 2-3 light beers a day     Family History   Problem Relation Age of Onset    Asthma Brother     Coronary Artery Disease Brother     Unknown/Adopted Daughter     Diabetes Mother     Hypertension Father     Diabetes Brother         d age 78    Asthma Brother          Current Outpatient Medications   Medication Sig Dispense Refill    carvedilol (COREG) 6.25 MG tablet Take 1 tablet (6.25 mg) by mouth 2 times daily (with meals) 180 tablet 3    Cholecalciferol (VITAMIN D3 PO) Take 500 Units by mouth daily       furosemide (LASIX) 20 MG tablet Take 1 tablet (20 mg) by mouth daily 90 tablet 3    ipratropium (ATROVENT) 0.06 % nasal spray Spray 2 sprays into both nostrils 3 times daily 15 mL 3    levothyroxine (SYNTHROID/LEVOTHROID) 137 MCG tablet Take 1 tablet by mouth once daily 90 tablet 0    lisinopril (ZESTRIL) 40 MG tablet Take 1 tablet (40 mg) by mouth daily 90 tablet 3    rosuvastatin (CRESTOR) 10 MG tablet Take 1 tablet (10 mg) by mouth daily 90 tablet 3    warfarin ANTICOAGULANT (COUMADIN) 5 MG tablet Take  2.5MG  ON  MONDAYS  AND  5MG all other days as directed 90 tablet 1     No Known Allergies    Reviewed and updated as needed this visit by clinical staff   Tobacco  Allergies  Meds              Reviewed and updated as needed this visit by Provider     Meds              Past Medical History:   Diagnosis Date    Acquired cataract     Arthritis 10+ years ago    Chronic atrial fibrillation (H)     Current use of long term anticoagulation     Hemorrhoids     Hypertension, goal below 140/90     Malignant neoplasm of thyroid gland (H)     Postsurgical hypothyroidism       Past Surgical History:   Procedure Laterality Date    BIOPSY  2005    COLONOSCOPY  2018    HC THYROIDECTOMY  01/01/2005    Hurthle cell cancer, total thyroidectomy    JOINT REPLACEMENT, HIP RT/LT Bilateral     LAMINECT/DISCECTOMY, LUMBAR  01/01/1999    lumbar  "micordiskectomy (L4-5)    TONSILLECTOMY & ADENOIDECTOMY  AGE 8     Review of Systems    Review of Systems  Constitutional, HEENT, cardiovascular, pulmonary, gi and gu systems are negative, except as otherwise noted.    OBJECTIVE:   /82   Pulse 77   Temp 97.6  F (36.4  C) (Tympanic)   Resp 15   Ht 1.81 m (5' 11.26\")   Wt 101.3 kg (223 lb 6.4 oz)   SpO2 97%   BMI 30.93 kg/m     Estimated body mass index is 30.93 kg/m  as calculated from the following:    Height as of this encounter: 1.81 m (5' 11.26\").    Weight as of this encounter: 101.3 kg (223 lb 6.4 oz).  Physical Exam  GENERAL: alert and no distress  EYES: Eyes grossly normal to inspection, PERRL and conjunctivae and sclerae normal  HENT: ear canals and TM's normal, nose and mouth without ulcers or lesions  NECK: no adenopathy, no asymmetry, masses, or scars  RESP: lungs clear to auscultation - no rales, rhonchi or wheezes  CV: regular rate and rhythm, normal S1 S2, no S3 or S4, no murmur, click or rub, no peripheral edema  ABDOMEN: soft, nontender, no hepatosplenomegaly, no masses and bowel sounds normal  MS: no gross musculoskeletal defects noted, no edema  SKIN: no suspicious lesions or rashes  NEURO: Normal strength and tone, mentation intact and speech normal  PSYCH: mentation appears normal, affect normal/bright    Diagnostic Test Results:  Labs drawn and in process    ASSESSMENT/PLAN:   William was seen today for medicare visit.    Diagnoses and all orders for this visit:    Encounter for Medicare annual wellness exam  -     PRIMARY CARE FOLLOW-UP SCHEDULING  -     CBC with platelets  -     Comprehensive metabolic panel (BMP + Alb, Alk Phos, ALT, AST, Total. Bili, TP)  -     TSH with free T4 reflex    Balance problems  -     Physical Therapy  Referral; Future- for balance and strengthening   -     CBC with platelets; Future  -     Comprehensive metabolic panel (BMP + Alb, Alk Phos, ALT, AST, Total. Bili, TP); Future  -     TSH with " "free T4 reflex; Future  -     Vitamin B12; Future  -     Fall risk precautions discussed    Hypertension goal BP (blood pressure) < 140/90 controlled  -     Refill: carvedilol (COREG) 6.25 MG tablet; Take 1 tablet (6.25 mg) by mouth 2 times daily (with meals)  -     Refill: furosemide (LASIX) 20 MG tablet; Take 1 tablet (20 mg) by mouth daily  -     Refill: lisinopril (ZESTRIL) 40 MG tablet; Take 1 tablet (40 mg) by mouth daily    Hyperlipidemia LDL goal <130  -     TSH with free T4 reflex; Future  -     Lipid panel reflex to direct LDL Non-fasting; Future  -     Refill: rosuvastatin (CRESTOR) 10 MG tablet; Take 1 tablet (10 mg) by mouth daily      Bilateral lower extremity edema  -     Refill: furosemide (LASIX) 20 MG tablet; Take 1 tablet (20 mg) by mouth daily    Rhinorrhea  -     Refill: ipratropium (ATROVENT) 0.06 % nasal spray; Spray 2 sprays into both nostrils 3 times daily    Chronic atrial fibrillation (H) on Long term (current) use of anticoagulants  -     INR  -     warfarin ANTICOAGULANT (COUMADIN) 5 MG tablet; Take  2.5MG  ON  MONDAYS  AND  5MG all other days as directed  -     Continue enrollment in the Anticoagulation clinic.       Patient has been advised of split billing requirements and indicates understanding: Yes      Counseling  Reviewed preventive health counseling, as reflected in patient instructions       Regular exercise       Healthy diet/nutrition      BMI  Estimated body mass index is 30.93 kg/m  as calculated from the following:    Height as of this encounter: 1.81 m (5' 11.26\").    Weight as of this encounter: 101.3 kg (223 lb 6.4 oz).   Weight management plan: Discussed healthy diet and exercise guidelines      He reports that he quit smoking about 47 years ago. His smoking use included cigarettes. He started smoking about 68 years ago. He has a 21 pack-year smoking history. He has never been exposed to tobacco smoke. He quit smokeless tobacco use about 25 years ago.      Return in " about 6 months (around 1/9/2025) for Medication check.    Signed Electronically by: Arelis Alexandra MD

## 2024-07-09 NOTE — PROGRESS NOTES
ANTICOAGULATION MANAGEMENT     William Lechuga 83 year old male is on warfarin with therapeutic INR result. (Goal INR 2.0-3.0)    Recent labs: (last 7 days)     07/09/24  0912   INR 2.52*       ASSESSMENT     Source(s): Chart Review  Previous INR was Therapeutic last 2(+) visits  Medication, diet, health changes since last INR chart reviewed; none identified         PLAN     Recommended plan for no diet, medication or health factor changes affecting INR     Dosing Instructions: Continue your current warfarin dose with next INR in 6 weeks       Summary  As of 7/9/2024      Full warfarin instructions:  2.5 mg every Mon; 5 mg all other days   Next INR check:  8/20/2024               Telephone call with William who verbalizes understanding and agrees to plan    Contact [phone] to schedule and with any changes, questions or concerns.     Education provided: Please call back if any changes to your diet, medications or how you've been taking warfarin  Goal range and lab monitoring: goal range and significance of current result    Plan made per [facility] anticoagulation protocol    Aleyda Quiles RN  Anticoagulation Clinic  7/9/2024    _______________________________________________________________________     Anticoagulation Episode Summary       Current INR goal:  2.0-3.0   TTR:  94.4% (1 y)   Target end date:  Indefinite   Send INR reminders to:  [facility] BLOOD    Indications    Current use of long term anticoagulation [Z79.01]  Chronic atrial fibrillation (H) [I48.20]             Comments:               Anticoagulation Care Providers       Provider Role Specialty Phone number    Arelis Alexandra MD Referring Family Medicine [phone]    Lashay Donahue APRN CNP Responsible Family Medicine [phone]

## 2024-07-11 DIAGNOSIS — R73.9 HYPERGLYCEMIA: Primary | ICD-10-CM

## 2024-07-11 LAB — HBA1C MFR BLD: 5.6 % (ref 0–5.6)

## 2024-07-12 DIAGNOSIS — E89.0 POSTSURGICAL HYPOTHYROIDISM: ICD-10-CM

## 2024-07-12 DIAGNOSIS — E87.1 HYPONATREMIA: Primary | ICD-10-CM

## 2024-07-12 DIAGNOSIS — E87.5 HYPERKALEMIA: ICD-10-CM

## 2024-07-12 RX ORDER — LEVOTHYROXINE SODIUM 137 UG/1
137 TABLET ORAL DAILY
Qty: 90 TABLET | Refills: 3 | Status: SHIPPED | OUTPATIENT
Start: 2024-07-12

## 2024-07-15 ENCOUNTER — TELEPHONE (OUTPATIENT)
Dept: FAMILY MEDICINE | Facility: CLINIC | Age: 84
End: 2024-07-15
Payer: MEDICARE

## 2024-07-15 NOTE — TELEPHONE ENCOUNTER
----- Message from Arelis Alexandra sent at 7/15/2024  9:03 AM CDT -----  Noted.   8/20 is far out. Recommend a sooner lab only appointment (non-fasting).Thanks

## 2024-07-15 NOTE — TELEPHONE ENCOUNTER
RN called and spoke with patient. RN relayed providers message, patient verbalized understanding. Patient states he has his wifes celebration of life this coming weekend and is busy this week so cannot come in. Patient requested later next week. RN scheduled for 7/25/24 for recheck.     Ester Friend RN on 7/15/2024 at 11:33 AM        Arelis Alexandra MD  7/15/2024  9:03 AM CDT       Noted.  8/20 is far out. Recommend a sooner lab only appointment (non-fasting).Thanks    Marycarmen Clement RN  7/15/2024  7:52 AM CDT       It appears patient has seen his result note this morning.     Will hold open to make sure patient schedules lab follow up (has future order for sodium and potassium lab).  I see he has a lab visit for INR on 8/20/24.    Provider's note indicates he can return for Na/K lab at his earliest convenience.   Okay to add this to the 8/20/24 lab visit?   Or does he need to do an additional lab only visit sooner?  Routed to Dr. Alexandra to advise.     Marycarmen BRUNER RN  [facility] Triage    Arelis Alexandra MD  7/12/2024  7:56 PM CDT       William-     Your recent labs showed-     Thyroid function test was normal. Continue current dose of Levothyroxine. Refills sent.  Cholesterol panel was normal. Continue current dose of Rosuvastatin along with a healthy well balanced diet.  Complete blood count was normal. Platelets were low but remain stable.  Vitamin B12 was borderline low- I recommend that you take an OTC Vitamin B12 supplement of 1000 mcg/day.  Complete Metabolic Panel (panel that checks liver function, kidney function and electrolytes) was normal except that sodium was low and potassium elevated- I recommend we repeat these labs. You can schedule a lab only appointment at your earliest convenience. You do not need to be fasting.     Glucose was elevated but no evidence of diabetes at this time.  A1c was 5.6.     Please contact me if you have any additional questions or concerns.      Arelis Alexandra MD

## 2024-07-25 ENCOUNTER — LAB (OUTPATIENT)
Dept: LAB | Facility: CLINIC | Age: 84
End: 2024-07-25
Payer: MEDICARE

## 2024-07-25 DIAGNOSIS — E87.5 HYPERKALEMIA: ICD-10-CM

## 2024-07-25 DIAGNOSIS — E87.1 HYPONATREMIA: ICD-10-CM

## 2024-07-25 PROCEDURE — 36415 COLL VENOUS BLD VENIPUNCTURE: CPT

## 2024-07-25 PROCEDURE — 84295 ASSAY OF SERUM SODIUM: CPT

## 2024-07-25 PROCEDURE — 84132 ASSAY OF SERUM POTASSIUM: CPT

## 2024-07-26 LAB
POTASSIUM SERPL-SCNC: 4.2 MMOL/L (ref 3.4–5.3)
SODIUM SERPL-SCNC: 138 MMOL/L (ref 135–145)

## 2024-08-21 ENCOUNTER — LAB (OUTPATIENT)
Dept: LAB | Facility: CLINIC | Age: 84
End: 2024-08-21
Payer: MEDICARE

## 2024-08-21 ENCOUNTER — ANTICOAGULATION THERAPY VISIT (OUTPATIENT)
Dept: ANTICOAGULATION | Facility: CLINIC | Age: 84
End: 2024-08-21

## 2024-08-21 DIAGNOSIS — Z79.01 CURRENT USE OF LONG TERM ANTICOAGULATION: Primary | ICD-10-CM

## 2024-08-21 DIAGNOSIS — Z79.01 CURRENT USE OF LONG TERM ANTICOAGULATION: ICD-10-CM

## 2024-08-21 DIAGNOSIS — I48.20 CHRONIC ATRIAL FIBRILLATION (H): Chronic | ICD-10-CM

## 2024-08-21 DIAGNOSIS — I48.20 CHRONIC ATRIAL FIBRILLATION (H): ICD-10-CM

## 2024-08-21 LAB — INR BLD: 2.7 (ref 0.9–1.1)

## 2024-08-21 PROCEDURE — 85610 PROTHROMBIN TIME: CPT

## 2024-08-21 PROCEDURE — 36416 COLLJ CAPILLARY BLOOD SPEC: CPT

## 2024-08-21 NOTE — PROGRESS NOTES
ANTICOAGULATION MANAGEMENT     William Lechuga 83 year old male is on warfarin with therapeutic INR result. (Goal INR 2.0-3.0)    Recent labs: (last 7 days)     08/21/24  1447   INR 2.7*       ASSESSMENT     Source(s): Chart Review  Previous INR was Therapeutic last 2(+) visits  Medication, diet, health changes since last INR chart reviewed; none identified         PLAN     Recommended plan for no diet, medication or health factor changes affecting INR     Dosing Instructions: Continue your current warfarin dose with next INR in 6 weeks       Summary  As of 8/21/2024      Full warfarin instructions:  2.5 mg every Mon; 5 mg all other days   Next INR check:  10/2/2024               Detailed voice message left for William with dosing instructions and follow up date.   Sent [facility] message with dosing and follow up instructions    Contact [phone] to schedule and with any changes, questions or concerns.     Education provided: Please call back if any changes to your diet, medications or how you've been taking warfarin    Plan made per [facility] anticoagulation protocol    Francia Wiggins RN  Anticoagulation Clinic  8/21/2024    _______________________________________________________________________     Anticoagulation Episode Summary       Current INR goal:  2.0-3.0   TTR:  94.4% (1 y)   Target end date:  Indefinite   Send INR reminders to:  [facility] BLOOD    Indications    Current use of long term anticoagulation [Z79.01]  Chronic atrial fibrillation (H) [I48.20]             Comments:               Anticoagulation Care Providers       Provider Role Specialty Phone number    Arelis Alexandra MD Referring Family Medicine [phone]    Lashay Donahue APRN CNP Responsible Family Medicine [phone]

## 2024-10-08 ENCOUNTER — ANTICOAGULATION THERAPY VISIT (OUTPATIENT)
Dept: ANTICOAGULATION | Facility: CLINIC | Age: 84
End: 2024-10-08

## 2024-10-08 ENCOUNTER — LAB (OUTPATIENT)
Dept: LAB | Facility: CLINIC | Age: 84
End: 2024-10-08
Payer: MEDICARE

## 2024-10-08 DIAGNOSIS — I48.20 CHRONIC ATRIAL FIBRILLATION (H): Chronic | ICD-10-CM

## 2024-10-08 DIAGNOSIS — Z79.01 CURRENT USE OF LONG TERM ANTICOAGULATION: Primary | ICD-10-CM

## 2024-10-08 DIAGNOSIS — Z79.01 CURRENT USE OF LONG TERM ANTICOAGULATION: ICD-10-CM

## 2024-10-08 DIAGNOSIS — I48.20 CHRONIC ATRIAL FIBRILLATION (H): ICD-10-CM

## 2024-10-08 LAB — INR BLD: 2.2 (ref 0.9–1.1)

## 2024-10-08 PROCEDURE — 85610 PROTHROMBIN TIME: CPT

## 2024-10-08 PROCEDURE — 36416 COLLJ CAPILLARY BLOOD SPEC: CPT

## 2024-10-08 NOTE — PROGRESS NOTES
ANTICOAGULATION MANAGEMENT     William Lcehuga 84 year old male is on warfarin with therapeutic INR result. (Goal INR 2.0-3.0)    Recent labs: (last 7 days)     10/08/24  0837   INR 2.2*       ASSESSMENT     Warfarin Lab Questionnaire    Warfarin Doses Last 7 Days      10/8/2024     7:34 AM   Dose in Tablet or Mg   TAB or MG? milligram (mg)     Pt Rptd Dose CHELLE MONDAY TUESDAY WED THURS FRIDAY SATURDAY   10/8/2024   7:34 AM 5 2.5 5 5 5 5 5         10/8/2024   Warfarin Lab Questionnaire   Missed doses within past 14 days? No   Changes in diet or alcohol within past 14 days? No   Medication changes since last result? No   Injuries or illness since last result? No   New shortness of breath, severe headaches or sudden changes in vision since last result? No   Abnormal bleeding since last result? No   Upcoming surgery, procedure? No        Previous result: Therapeutic last 2(+) visits  Additional findings: None       PLAN     Recommended plan for no diet, medication or health factor changes affecting INR     Dosing Instructions: Continue your current warfarin dose with next INR in 6 weeks       Summary  As of 10/8/2024      Full warfarin instructions:  2.5 mg every Mon; 5 mg all other days   Next INR check:  11/19/2024               Detailed voice message left for William with dosing instructions and follow up date.     Contact [phone] to schedule and with any changes, questions or concerns.     Education provided: Please call back if any changes to your diet, medications or how you've been taking warfarin    Plan made per [facility] anticoagulation protocol    Shala Stiles RN  10/8/2024  Anticoagulation Clinic  [facility] for routing messages: sumaya BLOOD  [facility] patient phone line: [phone]        _______________________________________________________________________     Anticoagulation Episode Summary       Current INR goal:  2.0-3.0   TTR:  94.4% (1 y)   Target end date:  Indefinite   Send INR reminders to:   [facility]    Indications    Current use of long term anticoagulation [Z79.01]  Chronic atrial fibrillation (H) [I48.20]             Comments:               Anticoagulation Care Providers       Provider Role Specialty Phone number    Arelis Alexandra MD Referring Family Medicine [phone]    Lashay Donahue APRN [facility] Family Medicine [phone]

## 2024-11-18 ENCOUNTER — OFFICE VISIT (OUTPATIENT)
Dept: FAMILY MEDICINE | Facility: CLINIC | Age: 84
End: 2024-11-18
Payer: MEDICARE

## 2024-11-18 VITALS
HEART RATE: 60 BPM | BODY MASS INDEX: 29.66 KG/M2 | HEIGHT: 72 IN | TEMPERATURE: 97.6 F | RESPIRATION RATE: 16 BRPM | OXYGEN SATURATION: 99 % | DIASTOLIC BLOOD PRESSURE: 76 MMHG | WEIGHT: 219 LBS | SYSTOLIC BLOOD PRESSURE: 128 MMHG

## 2024-11-18 DIAGNOSIS — L57.0 ACTINIC KERATOSIS: Primary | ICD-10-CM

## 2024-11-18 DIAGNOSIS — C73 MALIGNANT NEOPLASM OF THYROID GLAND (H): ICD-10-CM

## 2024-11-18 PROCEDURE — 17110 DESTRUCTION B9 LES UP TO 14: CPT | Performed by: FAMILY MEDICINE

## 2024-11-18 PROCEDURE — 99213 OFFICE O/P EST LOW 20 MIN: CPT | Mod: 25 | Performed by: FAMILY MEDICINE

## 2024-11-18 NOTE — PROGRESS NOTES
Assessment & Plan   William was seen today for lesion.    Diagnoses and all orders for this visit:    Actinic keratosis, left ear lobe  -     DESTRUCT BENIGN LESION, UP TO 14  Verbal Consent:   -     Liquid nitrogen was applied for 30 seconds to the skin lesion(s) three times  with a single thaw cycle in between. Warned risks of blistering, pain, pigment change, scarring, and incomplete resolution.  Advised patient to return if lesions do not completely resolve.     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     Patient education and Handout with home care instructions given. See AVS for details.    Return in about 2 weeks (around 12/2/2024), or if symptoms worsen or fail to improve.      Subjective   William is a 84 year old, presenting for the following health issues:  Lesion (Left ear )        11/18/2024     9:37 AM   Additional Questions   Roomed by Aidee   Accompanied by Self     History of Present Illness       Reason for visit:  Sore on left ear  Symptom onset:  1-2 weeks ago  Symptoms include:  Sore ear  Symptom intensity:  Mild  Symptom progression:  Staying the same  Had these symptoms before:  No   He is taking medications regularly.       Skin Lesion  Onset/Duration: 2 weeks  Description  Location: left ear   Color: skin colored and pink  Border description: irregular border, raised  Character: round  Itching: no  Bleeding:  No  Intensity:  mild  Progression of Symptoms:  same  Accompanying signs and symptoms:   Bleeding: No  Scaling: No  Excessive sun exposure/tanning: No  Sunscreen used: No  History:           Any previous history of skin cancer: YES  Any family history of melanoma: No  Previous episodes of similar lesion: No  Precipitating or alleviating factors: Pain   Therapies tried and outcome: none    History of Malignant  "neoplasm of thyroid gland (H)- 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 Last TSH normal-  TSH   Date Value Ref Range Status   07/09/2024 1.63 0.30 - 4.20 uIU/mL Final   08/09/2022 1.48 0.40 - 4.00 mU/L Final   10/19/2018 0.91 0.40 - 4.00 mU/L Final     HEALTH CARE MAINTENANCE: Up to date.     Review of Systems  Constitutional, HEENT, cardiovascular, pulmonary, gi and gu systems are negative, except as otherwise noted.      Objective    /76   Pulse 60   Temp 97.6  F (36.4  C) (Temporal)   Resp 16   Ht 1.832 m (6' 0.13\")   Wt 99.3 kg (219 lb)   SpO2 99%   BMI 29.60 kg/m    Body mass index is 29.6 kg/m .  Physical Exam   GENERAL: alert and no distress  SKIN: Sub centimeter actinic keratosis lesion on the left ear lobe- antihelix, stem overlying an erythematous base.           Signed Electronically by: Arelis Alexandra MD    "

## 2024-11-19 ENCOUNTER — ANTICOAGULATION THERAPY VISIT (OUTPATIENT)
Dept: ANTICOAGULATION | Facility: CLINIC | Age: 84
End: 2024-11-19

## 2024-11-19 ENCOUNTER — MYC MEDICAL ADVICE (OUTPATIENT)
Dept: ANTICOAGULATION | Facility: CLINIC | Age: 84
End: 2024-11-19

## 2024-11-19 ENCOUNTER — LAB (OUTPATIENT)
Dept: LAB | Facility: CLINIC | Age: 84
End: 2024-11-19
Payer: MEDICARE

## 2024-11-19 DIAGNOSIS — I48.20 CHRONIC ATRIAL FIBRILLATION (H): Chronic | ICD-10-CM

## 2024-11-19 DIAGNOSIS — Z79.01 CURRENT USE OF LONG TERM ANTICOAGULATION: Primary | ICD-10-CM

## 2024-11-19 DIAGNOSIS — I48.20 CHRONIC ATRIAL FIBRILLATION (H): ICD-10-CM

## 2024-11-19 DIAGNOSIS — Z79.01 CURRENT USE OF LONG TERM ANTICOAGULATION: ICD-10-CM

## 2024-11-19 LAB — INR BLD: 2.5 (ref 0.9–1.1)

## 2024-11-19 PROCEDURE — 85610 PROTHROMBIN TIME: CPT

## 2024-11-19 PROCEDURE — 36416 COLLJ CAPILLARY BLOOD SPEC: CPT

## 2024-11-19 NOTE — PROGRESS NOTES
ANTICOAGULATION MANAGEMENT     William Lechuga 84 year old male is on warfarin with therapeutic INR result. (Goal INR 2.0-3.0)    Recent labs: (last 7 days)     11/19/24  0812   INR 2.5*       ASSESSMENT     Warfarin Lab Questionnaire    Warfarin Doses Last 7 Days      11/18/2024    11:36 AM   Dose in Tablet or Mg   TAB or MG? milligram (mg)     Pt Rptd Dose SUNDAY MONDAY TUESDAY WED THURS FRIDAY SATURDAY 11/18/2024  11:36 AM 5 2.5 5 5 5 5 5         11/18/2024   Warfarin Lab Questionnaire   Missed doses within past 14 days? No   Changes in diet or alcohol within past 14 days? No   Medication changes since last result? No   Injuries or illness since last result? No   New shortness of breath, severe headaches or sudden changes in vision since last result? No   Abnormal bleeding since last result? No   Upcoming surgery, procedure? No   Best number to call with results? [phone]        Previous result: Therapeutic last 2(+) visits  Additional findings: None travel through dec-march 31 to [place]       PLAN     Recommended plan for no diet, medication or health factor changes affecting INR     Dosing Instructions: Continue your current warfarin dose with next INR in 4 weeks       Summary  As of 11/19/2024      Full warfarin instructions:  2.5 mg every Mon; 5 mg all other days   Next INR check:  --               Telephone call with William who verbalizes understanding and agrees to plan    Lab visit scheduled    Education provided: Please call back if any changes to your diet, medications or how you've been taking warfarin    Plan made per [facility] anticoagulation protocol    Nikky Lockhart RN  11/19/2024  Anticoagulation Clinic  [facility] for routing messages: sumaya BLOOD  [facility] patient phone line: [phone]        _______________________________________________________________________     Anticoagulation Episode Summary       Current INR goal:  2.0-3.0   TTR:  94.4% (1 y)   Target end date:  Indefinite   Send INR  reminders to:  [facility] [place]    Indications    Current use of long term anticoagulation [Z79.01]  Chronic atrial fibrillation (H) [I48.20]             Comments:  --             Anticoagulation Care Providers       Provider Role Specialty Phone number    Arelis Alexandra MD Referring Family Medicine [phone]    Lashay Donahue APRN [facility] Family Medicine [phone]

## 2024-11-19 NOTE — LETTER
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     Ordered by: Nikky Lockhart RN  Authorized by:  Arelis Alexandra MD   (NPI: [id number])                   Sincerely,        Nikky Lockhart RN

## 2024-12-24 ENCOUNTER — MYC MEDICAL ADVICE (OUTPATIENT)
Dept: ANTICOAGULATION | Facility: CLINIC | Age: 84
End: 2024-12-24
Payer: MEDICARE

## 2024-12-31 ENCOUNTER — ANTICOAGULATION THERAPY VISIT (OUTPATIENT)
Dept: ANTICOAGULATION | Facility: CLINIC | Age: 84
End: 2024-12-31
Payer: MEDICARE

## 2024-12-31 DIAGNOSIS — I48.20 CHRONIC ATRIAL FIBRILLATION (H): Chronic | ICD-10-CM

## 2024-12-31 DIAGNOSIS — Z79.01 CURRENT USE OF LONG TERM ANTICOAGULATION: Primary | ICD-10-CM

## 2024-12-31 LAB — INR (EXTERNAL): 2.7

## 2024-12-31 NOTE — PROGRESS NOTES
Incoming fax from  [facility]    Date of result 12/30/24    INR result 2.7    Route result to: sumaya BLOOD

## 2024-12-31 NOTE — PROGRESS NOTES
ANTICOAGULATION MANAGEMENT     William Lechuga 84 year old male is on warfarin with therapeutic INR result. (Goal INR 2.0-3.0)    Recent labs: (last 7 days)     12/30/24  0900   INR 2.7       ASSESSMENT     Source(s): Chart Review and Patient/Caregiver Call     Warfarin doses taken: Warfarin taken as instructed  Diet: No new diet changes identified  Medication/supplement changes: None noted  New illness, injury, or hospitalization: No  Signs or symptoms of bleeding or clotting: No  Previous result: Therapeutic last 2(+) visits  Additional findings: None       PLAN     Recommended plan for no diet, medication or health factor changes and previous 2 INR results within goal affecting INR     Dosing Instructions: Continue your current warfarin dose with next INR in 6 weeks       Summary  As of 12/31/2024      Full warfarin instructions:  2.5 mg every Mon; 5 mg all other days   Next INR check:  2/11/2025               Telephone call with William who verbalizes understanding and agrees to plan    Patient using outside facility for labs    Education provided: Contact [phone] with any changes, questions or concerns.     Plan made per [facility] anticoagulation protocol    Sultana Hedrick RN  12/31/2024  Anticoagulation Clinic  [facility] for routing messages: sumaya BLOOD  [facility] patient phone line: [phone]        _______________________________________________________________________     Anticoagulation Episode Summary       Current INR goal:  2.0-3.0   TTR:  94.4% (1 y)   Target end date:  Indefinite   Send INR reminders to:  ADWOA BLOOD    Indications    Current use of long term anticoagulation [Z79.01]  Chronic atrial fibrillation (H) [I48.20]             Comments:  --             Anticoagulation Care Providers       Provider Role Specialty Phone number    Arelis Alexandra MD Referring Family Medicine [phone]    Lashay Donahue APRN [facility] Family Medicine [phone]

## 2025-02-09 DIAGNOSIS — J34.89 RHINORRHEA: ICD-10-CM

## 2025-02-10 DIAGNOSIS — I48.20 CHRONIC ATRIAL FIBRILLATION (H): ICD-10-CM

## 2025-02-10 RX ORDER — IPRATROPIUM BROMIDE 42 UG/1
SPRAY, METERED NASAL
Qty: 15 ML | Refills: 0 | Status: SHIPPED | OUTPATIENT
Start: 2025-02-10

## 2025-02-10 RX ORDER — WARFARIN SODIUM 5 MG/1
TABLET ORAL
Qty: 90 TABLET | Refills: 1 | Status: SHIPPED | OUTPATIENT
Start: 2025-02-10

## 2025-02-10 NOTE — TELEPHONE ENCOUNTER
ANTICOAGULATION MANAGEMENT:  Medication Refill    Anticoagulation Summary  As of 12/31/2024      Warfarin maintenance plan:  2.5 mg (5 mg x 0.5) every Mon; 5 mg (5 mg x 1) all other days   Next INR check:  2/11/2025   Target end date:  Indefinite    Indications    Current use of long term anticoagulation [Z79.01]  Chronic atrial fibrillation (H) [I48.20]                 Anticoagulation Care Providers       Provider Role Specialty Phone number    Arelis Alexandra MD Referring Family Medicine [phone]    Lashay Donahue APRN CNP Responsible Family Medicine [phone]            Refill Criteria    Visit with referring provider/group: Meets criteria: visit within referring provider group in the last 15 months on 11/18/24    [facility] referral last signed: 03/12/2024; within last year:  Yes    Lab monitoring is up to date (not exceeding 2 weeks overdue): Yes    William meets all criteria for refill. Rx instructions and quantity match patient's current dosing plan. Warfarin 90 day supply with 1 refill granted per [facility] protocol     Kareen Wright RN  Anticoagulation Clinic

## 2025-02-11 ENCOUNTER — ANTICOAGULATION THERAPY VISIT (OUTPATIENT)
Dept: ANTICOAGULATION | Facility: CLINIC | Age: 85
End: 2025-02-11
Payer: MEDICARE

## 2025-02-11 ENCOUNTER — DOCUMENTATION ONLY (OUTPATIENT)
Dept: ANTICOAGULATION | Facility: CLINIC | Age: 85
End: 2025-02-11
Payer: MEDICARE

## 2025-02-11 DIAGNOSIS — I48.20 CHRONIC ATRIAL FIBRILLATION (H): ICD-10-CM

## 2025-02-11 DIAGNOSIS — Z79.01 CURRENT USE OF LONG TERM ANTICOAGULATION: Primary | ICD-10-CM

## 2025-02-11 DIAGNOSIS — I48.20 CHRONIC ATRIAL FIBRILLATION (H): Chronic | ICD-10-CM

## 2025-02-11 LAB — INR (EXTERNAL): 2.7

## 2025-02-11 NOTE — PROGRESS NOTES
ANTICOAGULATION MANAGEMENT     William Lechuga 84 year old male is on warfarin with therapeutic INR result. (Goal INR 2.0-3.0)    Recent labs: (last 7 days)     02/11/25  0949   INR 2.7       ASSESSMENT     Source(s): Chart Review and Patient/Caregiver Call     Warfarin doses taken: Warfarin taken as instructed  Diet: No new diet changes identified  Medication/supplement changes: None noted  New illness, injury, or hospitalization: No  Signs or symptoms of bleeding or clotting: No  Previous result: Therapeutic last 2(+) visits  Additional findings: None       PLAN     Recommended plan for no diet, medication or health factor changes affecting INR     Dosing Instructions: Continue your current warfarin dose with next INR in 6-7 weeks. Pt returns from [place] the first week in April which is 7 weeks. He may wait until he's back to check his INR.       Summary  As of 2/11/2025      Full warfarin instructions:  2.5 mg every Mon; 5 mg all other days   Next INR check:  4/1/2025               Telephone call with William who verbalizes understanding and agrees to plan and who agrees to plan and repeated back plan correctly    Patient using outside facility for labs    Education provided: None required    Plan made per [facility] anticoagulation protocol    Shala Stiles RN  2/11/2025  Anticoagulation Clinic  [employer] for routing messages: sumaya BLOOD  [facility] patient phone line: [phone]        _______________________________________________________________________     Anticoagulation Episode Summary       Current INR goal:  2.0-3.0   TTR:  94.4% (1 y)   Target end date:  Indefinite   Send INR reminders to:  ADWAO BLOOD    Indications    Current use of long term anticoagulation [Z79.01]  Chronic atrial fibrillation (H) [I48.20]             Comments:  --             Anticoagulation Care Providers       Provider Role Specialty Phone number    Arelis Alexandra MD Referring Family Medicine [phone]    Lashay Donahue,  DARIANA [facility] [phone]

## 2025-02-11 NOTE — PROGRESS NOTES
2/11/25 - incoming fax from [facility]     - 2/10/25 - 2.7   - collected at 10:56 am     - also need INR standing renewal     - [email or web address] gloria

## 2025-02-11 NOTE — PROGRESS NOTES
ANTICOAGULATION CLINIC REFERRAL RENEWAL REQUEST       An annual renewal order is required for all patients referred to [facility].?  Please review and sign the pended referral order for William Lechuga.       ANTICOAGULATION SUMMARY      Warfarin indication(s)   Atrial Fibrillation    Mechanical heart valve present?  NO       Current goal range   INR: 2.0-3.0     Goal appropriate for indication? Goal INR 2-3, standard for indication(s) above     Time in Therapeutic Range (TTR)  (Goal > 60%) 94.4%       Office visit with referring provider's group within last year yes on 11/18/24       Shala Stiles RN  [facility]    
None

## 2025-04-03 DIAGNOSIS — J34.89 RHINORRHEA: ICD-10-CM

## 2025-04-03 RX ORDER — IPRATROPIUM BROMIDE 42 UG/1
SPRAY, METERED NASAL
Qty: 15 ML | Refills: 2 | Status: SHIPPED | OUTPATIENT
Start: 2025-04-03

## 2025-04-08 ENCOUNTER — ANTICOAGULATION THERAPY VISIT (OUTPATIENT)
Dept: ANTICOAGULATION | Facility: CLINIC | Age: 85
End: 2025-04-08

## 2025-04-08 ENCOUNTER — LAB (OUTPATIENT)
Dept: LAB | Facility: CLINIC | Age: 85
End: 2025-04-08
Payer: MEDICARE

## 2025-04-08 DIAGNOSIS — I48.20 CHRONIC ATRIAL FIBRILLATION (H): Chronic | ICD-10-CM

## 2025-04-08 DIAGNOSIS — Z79.01 CURRENT USE OF LONG TERM ANTICOAGULATION: Primary | ICD-10-CM

## 2025-04-08 DIAGNOSIS — I48.20 CHRONIC ATRIAL FIBRILLATION (H): ICD-10-CM

## 2025-04-08 DIAGNOSIS — Z79.01 CURRENT USE OF LONG TERM ANTICOAGULATION: ICD-10-CM

## 2025-04-08 LAB — INR BLD: 3.1 (ref 0.9–1.1)

## 2025-04-08 PROCEDURE — 36416 COLLJ CAPILLARY BLOOD SPEC: CPT

## 2025-04-08 PROCEDURE — 85610 PROTHROMBIN TIME: CPT

## 2025-04-08 NOTE — PROGRESS NOTES
ANTICOAGULATION MANAGEMENT     William Lechuga 84 year old male is on warfarin with supratherapeutic INR result. (Goal INR 2.0-3.0)    Recent labs: (last 7 days)     04/08/25  1439   INR 3.1*       ASSESSMENT     Source(s): Chart Review and Patient/Caregiver Call     Warfarin doses taken: Warfarin taken as instructed  Diet: Decreased greens/vitamin K in diet; plans to resume previous intake  Medication/supplement changes:  took radha seltzer  New illness, injury, or hospitalization: Yes: has a cold took some otc meds last week  Signs or symptoms of bleeding or clotting: No  Previous result: Therapeutic last 2(+) visits  Additional findings: None       PLAN     Recommended plan for temporary change(s) affecting INR     Dosing Instructions: Continue your current warfarin dose with next INR in 2 weeks       Summary  As of 4/8/2025      Full warfarin instructions:  2.5 mg every Mon; 5 mg all other days   Next INR check:  4/22/2025               Telephone call with William who verbalizes understanding and agrees to plan    Patient offered & declined to schedule next visit will make apt    Education provided: Please call back if any changes to your diet, medications or how you've been taking warfarin  Symptom monitoring: monitoring for bleeding signs and symptoms    Plan made per [facility] anticoagulation protocol    Nikky Lockhart RN  4/8/2025  Anticoagulation Clinic  [facility] for routing messages: sumaya BLOOD  [facility] patient phone line: [phone]        _______________________________________________________________________     Anticoagulation Episode Summary       Current INR goal:  2.0-3.0   TTR:  96.2% (1 y)   Target end date:  Indefinite   Send INR reminders to:  ADWOA BLOOD    Indications    Current use of long term anticoagulation [Z79.01]  Chronic atrial fibrillation (H) [I48.20]             Comments:  --             Anticoagulation Care Providers       Provider Role Specialty Phone number    Nasrin  MD Arelis Referring Family Medicine [phone]    Lashay Donahue APRN CNP Responsible Family Medicine [phone]

## 2025-04-29 ENCOUNTER — LAB (OUTPATIENT)
Dept: LAB | Facility: CLINIC | Age: 85
End: 2025-04-29
Payer: MEDICARE

## 2025-04-29 ENCOUNTER — ANTICOAGULATION THERAPY VISIT (OUTPATIENT)
Dept: ANTICOAGULATION | Facility: CLINIC | Age: 85
End: 2025-04-29

## 2025-04-29 DIAGNOSIS — Z79.01 CURRENT USE OF LONG TERM ANTICOAGULATION: ICD-10-CM

## 2025-04-29 DIAGNOSIS — I48.20 CHRONIC ATRIAL FIBRILLATION (H): Chronic | ICD-10-CM

## 2025-04-29 DIAGNOSIS — I48.20 CHRONIC ATRIAL FIBRILLATION (H): ICD-10-CM

## 2025-04-29 DIAGNOSIS — Z79.01 CURRENT USE OF LONG TERM ANTICOAGULATION: Primary | ICD-10-CM

## 2025-04-29 LAB — INR BLD: 3.4 (ref 0.9–1.1)

## 2025-04-29 PROCEDURE — 85610 PROTHROMBIN TIME: CPT

## 2025-04-29 PROCEDURE — 36416 COLLJ CAPILLARY BLOOD SPEC: CPT

## 2025-04-29 NOTE — PROGRESS NOTES
ANTICOAGULATION MANAGEMENT     William Lechuga 84 year old male is on warfarin with therapeutic INR result. (Goal INR 2.0-3.0)    Recent labs: (last 7 days)     04/29/25  1045   INR 3.4*       ASSESSMENT     Warfarin Lab Questionnaire    Warfarin Doses Last 7 Days      4/29/2025     9:37 AM   Dose in Tablet or Mg   TAB or MG? milligram (mg)     Pt Rptd Dose SUNDAY MONDAY TUESDAY WED THURS FRIDAY SATURDAY 4/29/2025   9:37 AM 5 2.5 5 5 5 5 5         4/29/2025   Warfarin Lab Questionnaire   Missed doses within past 14 days? No   Changes in diet or alcohol within past 14 days? No   Medication changes since last result? No   Injuries or illness since last result? No   New shortness of breath, severe headaches or sudden changes in vision since last result? No   Abnormal bleeding since last result? No   Upcoming surgery, procedure? No   Best number to call with results? [phone]     Previous result: Supratherapeutic  Additional findings: Increase in alcohol Increase activity eating greens.       PLAN     Recommended plan for ongoing change(s) affecting INR     Dosing Instructions: decrease your warfarin dose (7.7% change) with next INR in 2 weeks       Summary  As of 4/29/2025      Full warfarin instructions:  2.5 mg every Mon, Thu; 5 mg all other days   Next INR check:  5/13/2025               Telephone call with William who verbalizes understanding and agrees to plan    Lab visit scheduled    Education provided: Symptom monitoring: monitoring for bleeding signs and symptoms    Plan made per [facility] anticoagulation protocol    Nikky Lockhart RN  4/29/2025  Anticoagulation Clinic  [facility] for routing messages: sumaya BLOOD  [facility] patient phone line: [phone]        _______________________________________________________________________     Anticoagulation Episode Summary       Current INR goal:  2.0-3.0   TTR:  90.5% (1 y)   Target end date:  Indefinite   Send INR reminders to:  ADWOA [facility]age     Current use of long term anticoagulation [Z79.01]  Chronic atrial fibrillation (H) [I48.20]             Comments:  --             Anticoagulation Care Providers       Provider Role Specialty Phone number    Arelis Alexandra MD Referring Family Medicine [phone]    Lashay Donahue APRN [facility] Family Medicine [phone]

## 2025-05-09 ENCOUNTER — TELEPHONE (OUTPATIENT)
Dept: FAMILY MEDICINE | Facility: CLINIC | Age: 85
End: 2025-05-09
Payer: MEDICARE

## 2025-05-09 NOTE — TELEPHONE ENCOUNTER
Melita is sending over a 4 page Neurology screening for this patient. She was wondering if you could please call to confirm when this fax is received at [phone]. It is ok to leave a detailed vm Thank you.

## 2025-05-13 ENCOUNTER — ANTICOAGULATION THERAPY VISIT (OUTPATIENT)
Dept: ANTICOAGULATION | Facility: CLINIC | Age: 85
End: 2025-05-13

## 2025-05-13 ENCOUNTER — LAB (OUTPATIENT)
Dept: LAB | Facility: CLINIC | Age: 85
End: 2025-05-13
Payer: MEDICARE

## 2025-05-13 DIAGNOSIS — I48.20 CHRONIC ATRIAL FIBRILLATION (H): ICD-10-CM

## 2025-05-13 DIAGNOSIS — I48.20 CHRONIC ATRIAL FIBRILLATION (H): Chronic | ICD-10-CM

## 2025-05-13 DIAGNOSIS — Z79.01 CURRENT USE OF LONG TERM ANTICOAGULATION: Primary | ICD-10-CM

## 2025-05-13 DIAGNOSIS — Z79.01 CURRENT USE OF LONG TERM ANTICOAGULATION: ICD-10-CM

## 2025-05-13 LAB — INR BLD: 2.4 (ref 0.9–1.1)

## 2025-05-13 PROCEDURE — 36416 COLLJ CAPILLARY BLOOD SPEC: CPT

## 2025-05-13 PROCEDURE — 85610 PROTHROMBIN TIME: CPT

## 2025-05-13 NOTE — PROGRESS NOTES
ANTICOAGULATION MANAGEMENT     William Lechuga 84 year old male is on warfarin with therapeutic INR result. (Goal INR 2.0-3.0)    Recent labs: (last 7 days)     05/13/25  1307   INR 2.4*       ASSESSMENT     Warfarin Lab Questionnaire    Warfarin Doses Last 7 Days      5/13/2025    11:15 AM   Dose in Tablet or Mg   TAB or MG? milligram (mg)     Pt Rptd Dose SUNDAY MONDAY TUESDAY WED THURS FRIDAY SATURDAY 5/13/2025  11:15 AM 5 2.5 5 5 2.5 5 5         5/13/2025   Warfarin Lab Questionnaire   Missed doses within past 14 days? No   Changes in diet or alcohol within past 14 days? No   Medication changes since last result? No   Injuries or illness since last result? No   New shortness of breath, severe headaches or sudden changes in vision since last result? No   Abnormal bleeding since last result? No   Upcoming surgery, procedure? No     Previous result: Supratherapeutic  Additional findings: None       PLAN     Recommended plan for no diet, medication or health factor changes affecting INR     Dosing Instructions: Continue your current warfarin dose with next INR in 2 weeks       Summary  As of 5/13/2025      Full warfarin instructions:  2.5 mg every Mon, Thu; 5 mg all other days   Next INR check:  5/27/2025               Telephone call with William who verbalizes understanding and agrees to plan    Lab visit scheduled    Education provided: Please call back if any changes to your diet, medications or how you've been taking warfarin    Plan made per [facility] anticoagulation protocol    Dagmar El RN  5/13/2025  Anticoagulation Clinic  [facility] for routing messages: sumaya BLOOD  [facility] patient phone line: [phone]        _______________________________________________________________________     Anticoagulation Episode Summary       Current INR goal:  2.0-3.0   TTR:  88.9% (1 y)   Target end date:  Indefinite   Send INR reminders to:  ADWOA BLOOD    Indications    Current use of long term anticoagulation  [Z79.01]  Chronic atrial fibrillation (H) [I48.20]             Comments:  --             Anticoagulation Care Providers       Provider Role Specialty Phone number    Arelis Alexandra MD Referring Family Medicine [phone]    Lashay Donahue APRN [facility] Family Medicine [phone]

## 2025-05-13 NOTE — TELEPHONE ENCOUNTER
Spoke with patient, patient states he is not interested in neurology screening. Form faxed back to [phone] advising patient is not requesting screening. Form placed in shredder per patient request.

## 2025-06-03 ENCOUNTER — LAB (OUTPATIENT)
Dept: LAB | Facility: CLINIC | Age: 85
End: 2025-06-03
Payer: MEDICARE

## 2025-06-03 ENCOUNTER — ANTICOAGULATION THERAPY VISIT (OUTPATIENT)
Dept: ANTICOAGULATION | Facility: CLINIC | Age: 85
End: 2025-06-03

## 2025-06-03 DIAGNOSIS — I48.20 CHRONIC ATRIAL FIBRILLATION (H): Chronic | ICD-10-CM

## 2025-06-03 DIAGNOSIS — Z79.01 CURRENT USE OF LONG TERM ANTICOAGULATION: Primary | ICD-10-CM

## 2025-06-03 DIAGNOSIS — I48.20 CHRONIC ATRIAL FIBRILLATION (H): ICD-10-CM

## 2025-06-03 DIAGNOSIS — Z79.01 CURRENT USE OF LONG TERM ANTICOAGULATION: ICD-10-CM

## 2025-06-03 LAB — INR BLD: 2.9 (ref 0.9–1.1)

## 2025-06-03 PROCEDURE — 85610 PROTHROMBIN TIME: CPT

## 2025-06-03 PROCEDURE — 36416 COLLJ CAPILLARY BLOOD SPEC: CPT

## 2025-06-03 NOTE — PROGRESS NOTES
ANTICOAGULATION MANAGEMENT     William Lechuga 84 year old male is on warfarin with therapeutic INR result. (Goal INR 2.0-3.0)    Recent labs: (last 7 days)     06/03/25  1334   INR 2.9*       ASSESSMENT     Warfarin Lab Questionnaire    Warfarin Doses Last 7 Days      6/3/2025     9:02 AM   Dose in Tablet or Mg   TAB or MG? milligram (mg)     Pt Rptd Dose CHELLE MONDAY TUESDAY WED THURS FRIDAY SATURDAY   6/3/2025   9:02 AM 5 2.5 5 5 2.5 5 5         6/3/2025   Warfarin Lab Questionnaire   Missed doses within past 14 days? No   Changes in diet or alcohol within past 14 days? No   Medication changes since last result? No   Injuries or illness since last result? No   New shortness of breath, severe headaches or sudden changes in vision since last result? No   Abnormal bleeding since last result? No   Upcoming surgery, procedure? No     Previous result: Therapeutic last visit; previously outside of goal range  Additional findings: None       PLAN     Recommended plan for no diet, medication or health factor changes affecting INR     Dosing Instructions: Continue your current warfarin dose with next INR in 5 weeks       Summary  As of 6/3/2025      Full warfarin instructions:  2.5 mg every Mon, Thu; 5 mg all other days   Next INR check:  7/1/2025               Telephone call with William who verbalizes understanding and agrees to plan    Lab visit scheduled    Education provided: Please call back if any changes to your diet, medications or how you've been taking warfarin    Plan made per [facility] anticoagulation protocol    Nikky Lockhart RN  6/3/2025  Anticoagulation Clinic  [facility] for routing messages: sumaya BLOOD  [facility] patient phone line: [phone]        _______________________________________________________________________     Anticoagulation Episode Summary       Current INR goal:  2.0-3.0   TTR:  88.9% (1 y)   Target end date:  Indefinite   Send INR reminders to:  ADWOA BLOOD    Indications    Current  use of long term anticoagulation [Z79.01]  Chronic atrial fibrillation (H) [I48.20]             Comments:  --             Anticoagulation Care Providers       Provider Role Specialty Phone number    Arelis Alexandra MD Referring Family Medicine [phone]    Lashay Donahue APRN [facility] Family Medicine [phone]

## 2025-06-09 ENCOUNTER — PATIENT OUTREACH (OUTPATIENT)
Dept: CARE COORDINATION | Facility: CLINIC | Age: 85
End: 2025-06-09
Payer: MEDICARE

## 2025-07-08 ENCOUNTER — LAB (OUTPATIENT)
Dept: LAB | Facility: CLINIC | Age: 85
End: 2025-07-08
Payer: MEDICARE

## 2025-07-08 ENCOUNTER — ANTICOAGULATION THERAPY VISIT (OUTPATIENT)
Dept: ANTICOAGULATION | Facility: CLINIC | Age: 85
End: 2025-07-08

## 2025-07-08 DIAGNOSIS — I48.20 CHRONIC ATRIAL FIBRILLATION (H): Chronic | ICD-10-CM

## 2025-07-08 DIAGNOSIS — Z79.01 CURRENT USE OF LONG TERM ANTICOAGULATION: Primary | ICD-10-CM

## 2025-07-08 DIAGNOSIS — Z79.01 CURRENT USE OF LONG TERM ANTICOAGULATION: ICD-10-CM

## 2025-07-08 DIAGNOSIS — I48.20 CHRONIC ATRIAL FIBRILLATION (H): ICD-10-CM

## 2025-07-08 LAB — INR BLD: 2.9 (ref 0.9–1.1)

## 2025-07-08 PROCEDURE — 85610 PROTHROMBIN TIME: CPT

## 2025-07-08 PROCEDURE — 36416 COLLJ CAPILLARY BLOOD SPEC: CPT

## 2025-07-08 NOTE — PROGRESS NOTES
ANTICOAGULATION MANAGEMENT     William Lechuga 84 year old male is on warfarin with therapeutic INR result. (Goal INR 2.0-3.0)    Recent labs: (last 7 days)     07/08/25  0816   INR 2.9*       ASSESSMENT     Warfarin Lab Questionnaire    Warfarin Doses Last 7 Days      7/7/2025     9:25 AM   Dose in Tablet or Mg   TAB or MG? milligram (mg)     Pt Rptd Dose SUNDAY MONDAY TUESDAY WED THURS FRIDAY SATURDAY 7/7/2025   9:25 AM 5 2.5 5 5 2.5 5 5         7/7/2025   Warfarin Lab Questionnaire   Missed doses within past 14 days? No   Changes in diet or alcohol within past 14 days? No   Medication changes since last result? No   Injuries or illness since last result? No   New shortness of breath, severe headaches or sudden changes in vision since last result? No   Abnormal bleeding since last result? No   Upcoming surgery, procedure? No   Best number to call with results? [phone]     Previous result: Therapeutic last 2(+) visits  Additional findings: None       PLAN     Recommended plan for no diet, medication or health factor changes affecting INR     Dosing Instructions: Continue your current warfarin dose with next INR in 7 weeks to align with provider office visit       Summary  As of 7/8/2025      Full warfarin instructions:  2.5 mg every Mon, Thu; 5 mg all other days   Next INR check:  8/25/2025               Telephone call with William who verbalizes understanding and agrees to plan    Check at provider office visit    Education provided: None required    Plan made per [facility] anticoagulation protocol    Kavita Rosado, RN  7/8/2025  Anticoagulation Clinic  C[facility]R for routing messages: sumaya BLOOD  [facility] patient phone line: [phone]        _______________________________________________________________________     Anticoagulation Episode Summary       Current INR goal:  2.0-3.0   TTR:  88.9% (1 y)   Target end date:  Indefinite   Send INR reminders to:  [facility] BLOOD    Indications    Current use of long term  anticoagulation [Z79.01]  Chronic atrial fibrillation (H) [I48.20]             Comments:  --             Anticoagulation Care Providers       Provider Role Specialty Phone number    Arelis Alexandra MD Referring Family Medicine [phone]    Lashay Donahue APRN [facility] Family Medicine [phone]

## 2025-07-22 ENCOUNTER — DOCUMENTATION ONLY (OUTPATIENT)
Dept: SLEEP MEDICINE | Facility: CLINIC | Age: 85
End: 2025-07-22
Payer: MEDICARE

## 2025-07-22 DIAGNOSIS — G47.33 OSA (OBSTRUCTIVE SLEEP APNEA): Primary | ICD-10-CM

## 2025-08-18 DIAGNOSIS — I48.20 CHRONIC ATRIAL FIBRILLATION (H): ICD-10-CM

## 2025-08-18 RX ORDER — WARFARIN SODIUM 5 MG/1
TABLET ORAL
Qty: 72 TABLET | Refills: 1 | Status: SHIPPED | OUTPATIENT
Start: 2025-08-18

## 2025-08-20 SDOH — HEALTH STABILITY: PHYSICAL HEALTH: ON AVERAGE, HOW MANY MINUTES DO YOU ENGAGE IN EXERCISE AT THIS LEVEL?: 60 MIN

## 2025-08-20 SDOH — HEALTH STABILITY: PHYSICAL HEALTH: ON AVERAGE, HOW MANY DAYS PER WEEK DO YOU ENGAGE IN MODERATE TO STRENUOUS EXERCISE (LIKE A BRISK WALK)?: 7 DAYS

## 2025-08-20 ASSESSMENT — SOCIAL DETERMINANTS OF HEALTH (SDOH): HOW OFTEN DO YOU GET TOGETHER WITH FRIENDS OR RELATIVES?: THREE TIMES A WEEK

## 2025-08-25 ENCOUNTER — OFFICE VISIT (OUTPATIENT)
Dept: FAMILY MEDICINE | Facility: CLINIC | Age: 85
End: 2025-08-25
Payer: MEDICARE

## 2025-08-25 ENCOUNTER — ANCILLARY PROCEDURE (OUTPATIENT)
Dept: GENERAL RADIOLOGY | Facility: CLINIC | Age: 85
End: 2025-08-25
Attending: FAMILY MEDICINE
Payer: MEDICARE

## 2025-08-25 VITALS
TEMPERATURE: 97.5 F | DIASTOLIC BLOOD PRESSURE: 70 MMHG | OXYGEN SATURATION: 98 % | SYSTOLIC BLOOD PRESSURE: 132 MMHG | HEART RATE: 65 BPM | WEIGHT: 209.4 LBS | RESPIRATION RATE: 18 BRPM | HEIGHT: 71 IN | BODY MASS INDEX: 29.31 KG/M2

## 2025-08-25 DIAGNOSIS — C73 MALIGNANT NEOPLASM OF THYROID GLAND (H): ICD-10-CM

## 2025-08-25 DIAGNOSIS — I10 HYPERTENSION GOAL BP (BLOOD PRESSURE) < 140/90: ICD-10-CM

## 2025-08-25 DIAGNOSIS — M54.41 BILATERAL LOW BACK PAIN WITH RIGHT-SIDED SCIATICA, UNSPECIFIED CHRONICITY: ICD-10-CM

## 2025-08-25 DIAGNOSIS — E78.5 HYPERLIPIDEMIA LDL GOAL <130: ICD-10-CM

## 2025-08-25 DIAGNOSIS — R60.0 BILATERAL LOWER EXTREMITY EDEMA: ICD-10-CM

## 2025-08-25 DIAGNOSIS — Z13.1 SCREENING FOR DIABETES MELLITUS: ICD-10-CM

## 2025-08-25 DIAGNOSIS — I48.20 CHRONIC ATRIAL FIBRILLATION (H): ICD-10-CM

## 2025-08-25 DIAGNOSIS — Z71.89 ADVANCED DIRECTIVES, COUNSELING/DISCUSSION: ICD-10-CM

## 2025-08-25 DIAGNOSIS — E89.0 POSTSURGICAL HYPOTHYROIDISM: ICD-10-CM

## 2025-08-25 DIAGNOSIS — Z00.00 ENCOUNTER FOR MEDICARE ANNUAL WELLNESS EXAM: Primary | ICD-10-CM

## 2025-08-25 LAB
ERYTHROCYTE [DISTWIDTH] IN BLOOD BY AUTOMATED COUNT: 14.2 % (ref 10–15)
EST. AVERAGE GLUCOSE BLD GHB EST-MCNC: 111 MG/DL
HBA1C MFR BLD: 5.5 % (ref 0–5.6)
HCT VFR BLD AUTO: 43.2 % (ref 40–53)
HGB BLD-MCNC: 14.4 G/DL (ref 13.3–17.7)
MCH RBC QN AUTO: 31.4 PG (ref 26.5–33)
MCHC RBC AUTO-ENTMCNC: 33.3 G/DL (ref 31.5–36.5)
MCV RBC AUTO: 94.1 FL (ref 78–100)
PLATELET # BLD AUTO: 141 10E3/UL (ref 150–450)
RBC # BLD AUTO: 4.59 10E6/UL (ref 4.4–5.9)
WBC # BLD AUTO: 5.29 10E3/UL (ref 4–11)

## 2025-08-25 PROCEDURE — 72100 X-RAY EXAM L-S SPINE 2/3 VWS: CPT | Mod: TC | Performed by: RADIOLOGY

## 2025-08-25 RX ORDER — ROSUVASTATIN CALCIUM 10 MG/1
10 TABLET, COATED ORAL DAILY
Qty: 90 TABLET | Refills: 3 | Status: SHIPPED | OUTPATIENT
Start: 2025-08-25

## 2025-08-25 RX ORDER — LISINOPRIL 40 MG/1
40 TABLET ORAL DAILY
Qty: 90 TABLET | Refills: 3 | Status: SHIPPED | OUTPATIENT
Start: 2025-08-25

## 2025-08-25 RX ORDER — FUROSEMIDE 20 MG/1
20 TABLET ORAL DAILY
Qty: 90 TABLET | Refills: 3 | Status: SHIPPED | OUTPATIENT
Start: 2025-08-25

## 2025-08-26 ENCOUNTER — RESULTS FOLLOW-UP (OUTPATIENT)
Dept: ANTICOAGULATION | Facility: CLINIC | Age: 85
End: 2025-08-26
Payer: MEDICARE

## 2025-08-26 ENCOUNTER — ANTICOAGULATION THERAPY VISIT (OUTPATIENT)
Dept: ANTICOAGULATION | Facility: CLINIC | Age: 85
End: 2025-08-26
Payer: MEDICARE

## 2025-08-26 DIAGNOSIS — I48.20 CHRONIC ATRIAL FIBRILLATION (H): Chronic | ICD-10-CM

## 2025-08-26 DIAGNOSIS — Z79.01 CURRENT USE OF LONG TERM ANTICOAGULATION: Primary | ICD-10-CM

## 2025-08-26 LAB
ALBUMIN SERPL BCG-MCNC: 4.5 G/DL (ref 3.5–5.2)
ALP SERPL-CCNC: 71 U/L (ref 40–150)
ALT SERPL W P-5'-P-CCNC: 33 U/L (ref 0–70)
ANION GAP SERPL CALCULATED.3IONS-SCNC: 11 MMOL/L (ref 7–15)
AST SERPL W P-5'-P-CCNC: 44 U/L (ref 0–45)
BILIRUB SERPL-MCNC: 1 MG/DL
BUN SERPL-MCNC: 19.9 MG/DL (ref 8–23)
CALCIUM SERPL-MCNC: 9.2 MG/DL (ref 8.8–10.4)
CHLORIDE SERPL-SCNC: 101 MMOL/L (ref 98–107)
CHOLEST SERPL-MCNC: 169 MG/DL
CREAT SERPL-MCNC: 0.93 MG/DL (ref 0.67–1.17)
EGFRCR SERPLBLD CKD-EPI 2021: 81 ML/MIN/1.73M2
FASTING STATUS PATIENT QL REPORTED: NO
FASTING STATUS PATIENT QL REPORTED: NO
GLUCOSE SERPL-MCNC: 92 MG/DL (ref 70–99)
HCO3 SERPL-SCNC: 26 MMOL/L (ref 22–29)
HDLC SERPL-MCNC: 73 MG/DL
INR PPP: 1.8 (ref 0.85–1.15)
LDLC SERPL CALC-MCNC: 84 MG/DL
NONHDLC SERPL-MCNC: 96 MG/DL
POTASSIUM SERPL-SCNC: 4.4 MMOL/L (ref 3.4–5.3)
PROT SERPL-MCNC: 7 G/DL (ref 6.4–8.3)
PROTHROMBIN TIME: 20.9 SECONDS (ref 11.8–14.8)
SODIUM SERPL-SCNC: 138 MMOL/L (ref 135–145)
TRIGL SERPL-MCNC: 59 MG/DL
TSH SERPL DL<=0.005 MIU/L-ACNC: 1.62 UIU/ML (ref 0.3–4.2)

## 2025-08-27 DIAGNOSIS — E89.0 POSTSURGICAL HYPOTHYROIDISM: ICD-10-CM

## 2025-08-27 RX ORDER — LEVOTHYROXINE SODIUM 137 UG/1
137 TABLET ORAL DAILY
Qty: 90 TABLET | Refills: 3 | Status: SHIPPED | OUTPATIENT
Start: 2025-08-27